# Patient Record
Sex: MALE | Race: WHITE | Employment: OTHER | URBAN - METROPOLITAN AREA
[De-identification: names, ages, dates, MRNs, and addresses within clinical notes are randomized per-mention and may not be internally consistent; named-entity substitution may affect disease eponyms.]

---

## 2017-01-02 ENCOUNTER — LAB ENCOUNTER (OUTPATIENT)
Dept: LAB | Age: 72
End: 2017-01-02
Attending: COLON & RECTAL SURGERY

## 2017-01-02 ENCOUNTER — OFFICE VISIT (OUTPATIENT)
Dept: FAMILY MEDICINE CLINIC | Facility: CLINIC | Age: 72
End: 2017-01-02

## 2017-01-02 VITALS
HEART RATE: 88 BPM | TEMPERATURE: 98 F | DIASTOLIC BLOOD PRESSURE: 74 MMHG | BODY MASS INDEX: 36 KG/M2 | RESPIRATION RATE: 16 BRPM | SYSTOLIC BLOOD PRESSURE: 114 MMHG | WEIGHT: 255 LBS | OXYGEN SATURATION: 94 %

## 2017-01-02 DIAGNOSIS — J01.10 ACUTE NON-RECURRENT FRONTAL SINUSITIS: Primary | ICD-10-CM

## 2017-01-02 DIAGNOSIS — H65.91 RIGHT NON-SUPPURATIVE OTITIS MEDIA: ICD-10-CM

## 2017-01-02 DIAGNOSIS — Z01.818 PRE-OP TESTING: ICD-10-CM

## 2017-01-02 DIAGNOSIS — K43.9 VENTRAL HERNIA WITHOUT OBSTRUCTION OR GANGRENE: ICD-10-CM

## 2017-01-02 LAB
ALBUMIN SERPL-MCNC: 4.1 G/DL (ref 3.5–4.8)
ALP LIVER SERPL-CCNC: 71 U/L (ref 45–117)
ALT SERPL-CCNC: 25 U/L (ref 17–63)
AST SERPL-CCNC: 15 U/L (ref 15–41)
BASOPHILS # BLD AUTO: 0.05 X10(3) UL (ref 0–0.1)
BASOPHILS NFR BLD AUTO: 0.6 %
BILIRUB SERPL-MCNC: 1 MG/DL (ref 0.1–2)
BUN BLD-MCNC: 14 MG/DL (ref 8–20)
CALCIUM BLD-MCNC: 8.8 MG/DL (ref 8.3–10.3)
CHLORIDE: 105 MMOL/L (ref 101–111)
CO2: 27 MMOL/L (ref 22–32)
CREAT BLD-MCNC: 0.96 MG/DL (ref 0.7–1.3)
EOSINOPHIL # BLD AUTO: 0.11 X10(3) UL (ref 0–0.3)
EOSINOPHIL NFR BLD AUTO: 1.3 %
ERYTHROCYTE [DISTWIDTH] IN BLOOD BY AUTOMATED COUNT: 12.9 % (ref 11.5–16)
GLUCOSE BLD-MCNC: 108 MG/DL (ref 70–99)
HCT VFR BLD AUTO: 46.1 % (ref 37–53)
HGB BLD-MCNC: 15.8 G/DL (ref 13–17)
IMMATURE GRANULOCYTE COUNT: 0.02 X10(3) UL (ref 0–1)
IMMATURE GRANULOCYTE RATIO %: 0.2 %
LYMPHOCYTES # BLD AUTO: 1.3 X10(3) UL (ref 0.9–4)
LYMPHOCYTES NFR BLD AUTO: 15.6 %
M PROTEIN MFR SERPL ELPH: 7.6 G/DL (ref 6.1–8.3)
MCH RBC QN AUTO: 30.3 PG (ref 27–33.2)
MCHC RBC AUTO-ENTMCNC: 34.3 G/DL (ref 31–37)
MCV RBC AUTO: 88.3 FL (ref 80–99)
MONOCYTES # BLD AUTO: 0.72 X10(3) UL (ref 0.1–0.6)
MONOCYTES NFR BLD AUTO: 8.7 %
NEUTROPHIL ABS PRELIM: 6.11 X10 (3) UL (ref 1.3–6.7)
NEUTROPHILS # BLD AUTO: 6.11 X10(3) UL (ref 1.3–6.7)
NEUTROPHILS NFR BLD AUTO: 73.6 %
PLATELET # BLD AUTO: 208 10(3)UL (ref 150–450)
POTASSIUM SERPL-SCNC: 4.3 MMOL/L (ref 3.6–5.1)
RBC # BLD AUTO: 5.22 X10(6)UL (ref 3.8–5.8)
RED CELL DISTRIBUTION WIDTH-SD: 41.7 FL (ref 35.1–46.3)
SODIUM SERPL-SCNC: 138 MMOL/L (ref 136–144)
WBC # BLD AUTO: 8.3 X10(3) UL (ref 4–13)

## 2017-01-02 PROCEDURE — 36415 COLL VENOUS BLD VENIPUNCTURE: CPT

## 2017-01-02 PROCEDURE — 85025 COMPLETE CBC W/AUTO DIFF WBC: CPT

## 2017-01-02 PROCEDURE — 85730 THROMBOPLASTIN TIME PARTIAL: CPT

## 2017-01-02 PROCEDURE — 99213 OFFICE O/P EST LOW 20 MIN: CPT | Performed by: PHYSICIAN ASSISTANT

## 2017-01-02 PROCEDURE — 80053 COMPREHEN METABOLIC PANEL: CPT

## 2017-01-02 RX ORDER — AZITHROMYCIN 250 MG/1
TABLET, FILM COATED ORAL
Qty: 6 TABLET | Refills: 0 | Status: SHIPPED | OUTPATIENT
Start: 2017-01-02 | End: 2017-02-21 | Stop reason: ALTCHOICE

## 2017-01-02 NOTE — PROGRESS NOTES
CHIEF COMPLAINT:   Patient presents with:  Cough: Pt c/o cough and nasal congestion X 1 month         HPI:   Gerdaneal Zimmerman. is a 70year old male who presents for cough and mucous for one month. Over this past week symptoms worsened.  He admits to [facility] (hypertension) 11/16/2013   • Left bundle branch block 11/14/2013   • Perforated sigmoid colon ([facility] [address] 11/14/2013   • Primary localized osteoarthrosis, lower leg 5/23/2012   • Sepsis ([facility] [address] 11/14/2013   • Stricture of sigmoid colon ([facility] [address] 11/14/2013   • Wound Pulse 88  Temp(Src) 98 °F (36.7 °C) (Oral)  Resp 16  Wt 255 lb  SpO2 94%  GENERAL: well developed, well nourished,in no apparent distress  SKIN: no rashes, no suspicious lesions  EYES: Conjunctiva clear. No scleral icterus.   HENT: Atraumatic, normocephali

## 2017-01-03 LAB — APTT PPP: 33.7 SECONDS (ref 25–34)

## 2017-01-11 ENCOUNTER — SURGERY (OUTPATIENT)
Age: 72
End: 2017-01-11

## 2017-01-11 PROBLEM — K43.9 VENTRAL HERNIA WITHOUT OBSTRUCTION OR GANGRENE: Status: ACTIVE | Noted: 2017-01-11

## 2017-01-24 ENCOUNTER — OFFICE VISIT (OUTPATIENT)
Dept: SURGERY | Facility: CLINIC | Age: 72
End: 2017-01-24

## 2017-01-24 VITALS — TEMPERATURE: 98 F | HEIGHT: 71 IN | BODY MASS INDEX: 34.58 KG/M2 | WEIGHT: 247 LBS

## 2017-01-24 DIAGNOSIS — K43.2 VENTRAL INCISIONAL HERNIA: Primary | ICD-10-CM

## 2017-01-24 PROCEDURE — 99024 POSTOP FOLLOW-UP VISIT: CPT | Performed by: COLON & RECTAL SURGERY

## 2017-01-24 RX ORDER — CEFADROXIL 500 MG/1
500 CAPSULE ORAL 2 TIMES DAILY
Qty: 20 CAPSULE | Refills: 0 | Status: SHIPPED | OUTPATIENT
Start: 2017-01-24 | End: 2017-02-03

## 2017-01-24 NOTE — PROGRESS NOTES
Post Operative Visit Note       Active Problems  1.  Ventral incisional hernia         Chief Complaint   Post-Op     History of Present Illness     This patient presents for further care and treatment after a ventral incisional herniorrhaphy with mesh was d without mention of obstruction or gangrene 6/29/2012   • Diverticulitis large intestine 7/24/2013   • HTN (hypertension) 11/16/2013   • Left bundle branch block 11/14/2013   • Perforated sigmoid colon ([facility] [address] 11/14/2013   • Primary localized osteoarthrosis, snare polypectomy, 10/1/2013 colonoscopy w/ dilatation of anastomotic stricture    REPAIR OF HYDROCELE,TUNICA  2000    PART REMOVAL COLON W END COLOSTOMY      VENTRAL HERNIA REPAIR  10/3/2014    Comment Procedure:  HERNIA VENTRAL REPAIR;  Surgeon: Jose Estrada Education:                 Number of children:               Social History Main Topics    Smoking Status: Former Smoker                   Packs/Day: 1.00  Years: 12        Quit date: 01/01/1975    Smokeless Status: Never Used                        Assurant for dysuria, urgency, frequency and difficulty urinating. Musculoskeletal: Negative for myalgias and arthralgias. Skin: Negative for color change and rash. Neurological: Negative for tremors, syncope and weakness.    Hematological: Negative for adenop assistance. Clinical examination reveals the abdomen is soft, nondistended, nontender, good bowel sounds. There is no evidence of recurrent residual hernia. He has some very slight moisture on the dressing on his left upper quadrant wound.   There is n

## 2017-01-25 NOTE — PATIENT INSTRUCTIONS
This patient presents for further care and treatment after a ventral incisional herniorrhaphy with mesh was done in the left upper quadrant, and the another ventral incisional hernia with suture was performed in the right midabdomen.     This patient is hyp

## 2017-01-31 ENCOUNTER — OFFICE VISIT (OUTPATIENT)
Dept: SURGERY | Facility: CLINIC | Age: 72
End: 2017-01-31

## 2017-01-31 VITALS — HEIGHT: 71 IN | WEIGHT: 250 LBS | TEMPERATURE: 98 F | BODY MASS INDEX: 35 KG/M2

## 2017-01-31 DIAGNOSIS — K43.2 VENTRAL INCISIONAL HERNIA: Primary | ICD-10-CM

## 2017-01-31 PROCEDURE — 99024 POSTOP FOLLOW-UP VISIT: CPT | Performed by: COLON & RECTAL SURGERY

## 2017-01-31 NOTE — PROGRESS NOTES
Follow Up Visit Note       Active Problems      1.  Ventral incisional hernia          Chief Complaint   Hernia    History of Present Illness  This patient presents for continued care and treatment following ventral incisional herniorrhaphy with mesh perfor • Dizziness and giddiness 6/29/2012   • Laboratory examination ordered as part of a routine general medical examination 6/29/2012   • Other seborrheic keratosis 6/29/2012   • Plantar wart 6/29/2012   • Screening for iron deficiency anemia 6/29/2012   • S • Unspecified sleep apnea EDWRAD psg 10/22/13 [place] 11-6-13     AHI 33, sao2 76% CPAP 15    • Hepatic cyst 3/14/2013     Lt hepatic cysts   • Fatty infiltration of liver 3/14/2013     mild-no recent symptoms   • High blood pressure          Past Surgical H SURGERY      Comment umbilical and right inguinal    HERNIA SURGERY  2005    Comment hernia repair    VENTRAL HERNIA REPAIR N/A 1/11/2017    Comment Procedure:  HERNIA VENTRAL REPAIR;  Surgeon: Sarkis Chowdary MD;  Location: [address] [place] [address] OR       [facility] Constitutional: Negative for fever, chills, diaphoresis, fatigue and unexpected weight change. HENT: Negative for hearing loss, nosebleeds, sore throat and trouble swallowing. Respiratory: Negative for apnea, cough, shortness of breath and wheezing. Neurological: He is oriented to person, place, and time. Skin: Skin is warm and dry. He is not diaphoretic. No erythema. Nursing note and vitals reviewed.     Assessment    Assessment   Ventral incisional hernia  (primary encounter diagnosis) surrounding cellulitis. There is no urticarial lesions surrounding this incision. Additionally, the patient mid back along the same dermatome there is an urticarial rash with some very small vesicles present.     At this junction I recommend the patient s provided further documentation of their own if necessary.

## 2017-02-01 NOTE — PATIENT INSTRUCTIONS
This patient presents for continued care and treatment following ventral incisional herniorrhaphy with mesh performed in the left upper quadrant and ventral incisional hernia repaired with suture in the right mid at area    The patient is hyper allergic to dermatologist.  I have referred him to Dr. Winston Leventhal. The patient has a very unusual case where he has reaction to any adhesive material as well as contact dermatitis following any hospitalization.   The patient has never had any issues with contact derm

## 2017-02-21 ENCOUNTER — OFFICE VISIT (OUTPATIENT)
Dept: FAMILY MEDICINE CLINIC | Facility: CLINIC | Age: 72
End: 2017-02-21

## 2017-02-21 VITALS
OXYGEN SATURATION: 98 % | SYSTOLIC BLOOD PRESSURE: 118 MMHG | RESPIRATION RATE: 16 BRPM | BODY MASS INDEX: 35 KG/M2 | DIASTOLIC BLOOD PRESSURE: 68 MMHG | HEART RATE: 84 BPM | TEMPERATURE: 98 F | WEIGHT: 253 LBS

## 2017-02-21 DIAGNOSIS — B37.0 THRUSH, ORAL: Primary | ICD-10-CM

## 2017-02-21 DIAGNOSIS — K14.0 TONGUE ULCERATION: ICD-10-CM

## 2017-02-21 PROCEDURE — 99213 OFFICE O/P EST LOW 20 MIN: CPT | Performed by: PHYSICIAN ASSISTANT

## 2017-02-21 NOTE — PROGRESS NOTES
CHIEF COMPLAINT:   Patient presents with:  Canker Sores: tongue. HPI:   Chula Morales. is a 70year old male who presents for canker sores on tongue and inside of mouth for 4 days.   Wife reports pt has been on multiple antibiotics and steroids rec • Laboratory examination ordered as part of a routine general medical examination 6/29/2012   • Other seborrheic keratosis 6/29/2012   • Plantar wart 6/29/2012   • Screening for iron deficiency anemia 6/29/2012   • Screening for lipoid disorders 6/29/2012 • Unspecified sleep apnea EDWRAD psg 10/22/13 [place] 11-6-13     AHI 33, sao2 76% CPAP 15    • Hepatic cyst 3/14/2013     Lt hepatic cysts   • Fatty infiltration of liver 3/14/2013     mild-no recent symptoms   • High blood pressure           Past Surgical His HERNIA SURGERY      Comment umbilical and right inguinal    HERNIA SURGERY  2005    Comment hernia repair    VENTRAL HERNIA REPAIR N/A 1/11/2017    Comment Procedure:  HERNIA VENTRAL REPAIR;  Surgeon: Jeevan Gold MD;  Location: [address] [place] [address] OR           [facility] PLAN: Meds as below as symptoms suspicious for thrush- use as directed. Comfort care as described in Patient Instructions. Pt advised follow up with PCP should symptoms acutely worsen or fail to improve with medication. Avoid spicy and acidic foods. [place] Stabs happens when something lets too much Candida grow inside your mouth and throat. Certain things that change the normal balance of organisms in the mouth can lead to thrush. One example is antibiotic medicine.  This medicine may kill some of the normal In some cases, you may need an antifungal pill. This can remove Candida throughout your body. Or you may need medicine through an IV. These treatments depend on how severe your infection is, and what other health conditions you have.   If you are at high ri © 8115-8476 The [address], [address] [place]. All rights reserved. This information is not intended as a substitute for professional medical care. Always follow your healthcare professional's instructions.               Lehta Tapia

## 2017-02-21 NOTE — PATIENT INSTRUCTIONS
Candida Infection: Nirmal Wiseman is a type of fungal infection in the mouth and throat. Garo Segura does not usually affect healthy adults. It is more common in people with a weakened immune system. It is also more likely if you take antibiotics.  Garo Segura is n Your health care provider will ask about your medical history and your symptoms. He or she will look closely at your mouth and throat. White or red patches will be scraped with a tongue depressor.  The sample will be sent to a lab to test. This test can usu You may be able to help prevent some cases of thrush. Make sure to:  · Practice good oral hygiene. Try using a chlorhexidine mouthwash. · Clean your dentures regularly as instructed. Make sure they fit you correctly.   · After using a corticosteroid inhale

## 2017-03-06 RX ORDER — MELOXICAM 15 MG/1
TABLET ORAL
Qty: 90 TABLET | Refills: 0 | Status: SHIPPED | OUTPATIENT
Start: 2017-03-06 | End: 2017-06-01

## 2017-05-16 ENCOUNTER — OFFICE VISIT (OUTPATIENT)
Dept: SURGERY | Facility: CLINIC | Age: 72
End: 2017-05-16

## 2017-05-16 VITALS
DIASTOLIC BLOOD PRESSURE: 60 MMHG | WEIGHT: 255 LBS | HEIGHT: 71 IN | HEART RATE: 80 BPM | BODY MASS INDEX: 35.7 KG/M2 | TEMPERATURE: 99 F | SYSTOLIC BLOOD PRESSURE: 120 MMHG

## 2017-05-16 DIAGNOSIS — B36.9 FUNGAL DERMATITIS: Primary | ICD-10-CM

## 2017-05-16 PROCEDURE — 99214 OFFICE O/P EST MOD 30 MIN: CPT | Performed by: COLON & RECTAL SURGERY

## 2017-05-16 RX ORDER — FLUCONAZOLE 200 MG/1
200 TABLET ORAL DAILY
Qty: 14 TABLET | Refills: 0 | Status: SHIPPED | OUTPATIENT
Start: 2017-05-16 | End: 2017-05-30

## 2017-05-16 NOTE — PROGRESS NOTES
Follow Up Visit Note       Active Problems      1. Fungal dermatitis          Chief Complaint   Patient presents with:  Hernia: further care and treatment Ventral incisional hernia.  S/p Ventral incisional herniorrhaphy with mesh left upper quadrant, ventra stones Rt lower pole kidney   • Diverticulitis 4/16/2013   • Cardiomyopathy ([facility] [address]      nonischemic LVEF 45-50%   • Anxiety state, unspecified 6/29/2012   • Arthropathy, unspecified, site unspecified 6/29/2012   • Cellulitis and abscess of face 6/29/2012 (pre-diabetes)    • Visual impairment      glasses   • Shortness of breath      ON EXERTION NOT REQUIRING O2   • Osteoarthrosis, unspecified whether generalized or localized, unspecified site    • History of blood transfusion 2001   • Calculus of kidney Comment Cysto, R RPG, R ureteral dilation, R URS, R Nephroscopy w/ stone manipulation and laser litho of renal calculi, R stent placement     OTHER SURGICAL HISTORY  3/21/16    Comment Cysto Stent Removal     OTHER SURGICAL HISTORY  3/22/16    Comment Rt E Review of Systems  The Review of Systems has been reviewed by me during today. Review of Systems   Constitutional: Negative for fever, chills, diaphoresis, fatigue and unexpected weight change.    HENT: Negative for hearing loss, nosebleeds, sore throat dermatitis  (primary encounter diagnosis)    Plan   This patient has had multiple ventral incisional hernia repairs. He presents at this time with some skin changes around the previous incision sites.     This patient was seeing the dermatologist.  He was refill. If he still has a rash at the end of 14 days, he should refill the Diflucan and see me 1 week after this first refill. Otherwise I will see him on an as-needed basis.   There remains no evidence of recurrent residual ventral incisional hernia

## 2017-05-17 NOTE — PATIENT INSTRUCTIONS
This patient has had multiple ventral incisional hernia repairs. He presents at this time with some skin changes around the previous incision sites. This patient was seeing the dermatologist.  He was started on a medication. He get oral thrush.   He go he should refill the Diflucan and see me 1 week after this first refill. Otherwise I will see him on an as-needed basis. There remains no evidence of recurrent residual ventral incisional hernia on today's examination. Woody De La Vega

## 2017-05-30 RX ORDER — FLUCONAZOLE 200 MG/1
200 TABLET ORAL DAILY
Qty: 14 TABLET | Refills: 0 | Status: SHIPPED | OUTPATIENT
Start: 2017-05-30 | End: 2017-06-13

## 2017-06-02 RX ORDER — MELOXICAM 15 MG/1
TABLET ORAL
Qty: 90 TABLET | Refills: 0 | Status: SHIPPED | OUTPATIENT
Start: 2017-06-02 | End: 2017-08-31

## 2017-06-06 ENCOUNTER — OFFICE VISIT (OUTPATIENT)
Dept: SURGERY | Facility: CLINIC | Age: 72
End: 2017-06-06

## 2017-06-06 VITALS
WEIGHT: 255 LBS | HEART RATE: 80 BPM | DIASTOLIC BLOOD PRESSURE: 72 MMHG | BODY MASS INDEX: 36 KG/M2 | SYSTOLIC BLOOD PRESSURE: 140 MMHG | TEMPERATURE: 98 F

## 2017-06-06 DIAGNOSIS — B36.9 FUNGAL DERMATITIS: Primary | ICD-10-CM

## 2017-06-06 PROCEDURE — 99213 OFFICE O/P EST LOW 20 MIN: CPT | Performed by: COLON & RECTAL SURGERY

## 2017-06-06 NOTE — PROGRESS NOTES
Follow Up Visit Note       Active Problems      1.  Fungal dermatitis          Chief Complaint   Patient presents with:  Derm Problem: Had fungal infection on abdomen, also thinks he has another bulge - possible hernia        History of Present Illness  Thi unspecified, site unspecified 6/29/2012   • Cellulitis and abscess of face 6/29/2012   • Dizziness and giddiness 6/29/2012   • Laboratory examination ordered as part of a routine general medical examination 6/29/2012   • Other seborrheic keratosis 6/29/201 unspecified site    • History of blood transfusion 2001   • Calculus of kidney    • Prediabetes    • Unspecified sleep apnea EDWRAD psg 10/22/13 [place] 11-6-13     AHI 33, sao2 76% CPAP 15    • Hepatic cyst 3/14/2013     Lt hepatic cysts   • Fatty infiltration Comment Cysto Stent Removal     OTHER SURGICAL HISTORY  3/22/16    Comment Rt ESWL    VENTRAL HERNIA REPAIR N/A 1/11/2017    Comment Procedure:  HERNIA VENTRAL REPAIR;  Surgeon: Kosta Gabriel MD;  Location: [address] [place] [address] OR    HERNIA SURGERY      Comment umbilic today.  Review of Systems   Constitutional: Negative for fever, chills, diaphoresis, fatigue and unexpected weight change. HENT: Negative for hearing loss, nosebleeds, sore throat and trouble swallowing.     Respiratory: Negative for apnea, cough, shortne Bowel sounds abnormal activity normal pitch. There are no interruptions in the skin. There are no full-thickness eschars or other abnormalities at any of the incisions. This dermatitis is a topical dermatitis showing a very well demarcated edge.        Manish Harding his prolonged fluconazole treatment. The bad news is they still appear to remain fairly aggressive. The largest is vertical along the midline incision in the epigastrium and it measures approximately 6 cm tall by 3 cm wide.   The other 2 lesions are more

## 2017-06-07 PROBLEM — K43.9 VENTRAL HERNIA WITHOUT OBSTRUCTION OR GANGRENE: Status: RESOLVED | Noted: 2017-01-11 | Resolved: 2017-06-07

## 2017-06-07 RX ORDER — FLUCONAZOLE 200 MG/1
200 TABLET ORAL DAILY
Qty: 10 TABLET | Refills: 1 | Status: SHIPPED | OUTPATIENT
Start: 2017-06-07 | End: 2017-06-17

## 2017-06-07 NOTE — PATIENT INSTRUCTIONS
This patient presents this time for further care and evaluation of a severe fungal dermatitis. This patient has significant topical allergies.   Every time he goes to the hospital he gets systemic hives and a systemic reaction to tape and other hospital transverse incisions that does not currently represent a true hernia. Bowel sounds abnormal activity normal pitch. There are no interruptions in the skin. There are no full-thickness eschars or other abnormalities at any of the incisions.   This dermatit

## 2017-06-20 ENCOUNTER — OFFICE VISIT (OUTPATIENT)
Dept: SURGERY | Facility: CLINIC | Age: 72
End: 2017-06-20

## 2017-06-20 VITALS
TEMPERATURE: 98 F | HEIGHT: 71 IN | BODY MASS INDEX: 35.7 KG/M2 | DIASTOLIC BLOOD PRESSURE: 78 MMHG | HEART RATE: 80 BPM | WEIGHT: 255 LBS | SYSTOLIC BLOOD PRESSURE: 130 MMHG

## 2017-06-20 DIAGNOSIS — B36.9 FUNGAL DERMATITIS: Primary | ICD-10-CM

## 2017-06-20 PROCEDURE — 99212 OFFICE O/P EST SF 10 MIN: CPT | Performed by: COLON & RECTAL SURGERY

## 2017-06-20 RX ORDER — FLUCONAZOLE 200 MG/1
200 TABLET ORAL DAILY
Refills: 1 | COMMUNITY
Start: 2017-06-09 | End: 2017-10-27 | Stop reason: ALTCHOICE

## 2017-06-20 NOTE — PATIENT INSTRUCTIONS
This patient has had a fungal dermatitis on the abdominal wall. It has been extremely refractory to any treatment. We have had the patient on a very long course of oral Diflucan.   At the last visit, at the patient suggestion, he started reapplying also t

## 2017-06-20 NOTE — PROGRESS NOTES
Follow Up Visit Note       Active Problems      1.  Fungal dermatitis          Chief Complaint   Patient presents with:  Derm Problem: pt here for 2 week further care and treatment Fungal dermatitis on abdomen        History of Present Illness  This patient of obstruction or gangrene 6/29/2012   • Diverticulitis large intestine 7/24/2013   • HTN (hypertension) 11/16/2013   • Left bundle branch block 11/14/2013   • [facility] 11/14/2013   • Primary localized osteoarthrosis, lower leg 5/23/2 polypectomy, 10/1/2013 colonoscopy w/ dilatation of anastomotic stricture    REPAIR OF HYDROCELE,TUNICA  2000    PART REMOVAL COLON W END COLOSTOMY      VENTRAL HERNIA REPAIR  10/3/2014    Comment Procedure:  HERNIA VENTRAL REPAIR;  Surgeon: Kisha Levin Spouse Name:                       Years of Education:                 Number of children:               Social History Main Topics    Smoking Status: Former Smoker                   Packs/Day: 1.00  Years: 12        Quit date: 01/01/1975    Smo Hematological: Negative for adenopathy. Does not bruise/bleed easily. Psychiatric/Behavioral: Negative for behavioral problems and sleep disturbance.         Physical Findings   /78 mmHg  Pulse 80  Temp(Src) 98 °F (36.7 °C)  Ht 71\"  Wt 255 lb  BM placed in this encounter. Imaging & Referrals   None    Follow Up  Return if symptoms worsen or fail to improve.     Yolande Pacheco MD

## 2017-08-14 ENCOUNTER — APPOINTMENT (OUTPATIENT)
Dept: CT IMAGING | Facility: HOSPITAL | Age: 72
End: 2017-08-14
Attending: EMERGENCY MEDICINE
Payer: MEDICARE

## 2017-08-14 ENCOUNTER — HOSPITAL ENCOUNTER (EMERGENCY)
Facility: HOSPITAL | Age: 72
Discharge: HOME | End: 2017-08-14
Attending: EMERGENCY MEDICINE
Payer: MEDICARE

## 2017-08-14 ENCOUNTER — APPOINTMENT (OUTPATIENT)
Dept: CT IMAGING | Age: 72
End: 2017-08-14
Attending: EMERGENCY MEDICINE
Payer: MEDICARE

## 2017-08-14 ENCOUNTER — OFFICE VISIT (OUTPATIENT)
Dept: FAMILY MEDICINE CLINIC | Facility: CLINIC | Age: 72
End: 2017-08-14

## 2017-08-14 ENCOUNTER — TELEPHONE (OUTPATIENT)
Dept: FAMILY MEDICINE CLINIC | Facility: CLINIC | Age: 72
End: 2017-08-14

## 2017-08-14 VITALS
DIASTOLIC BLOOD PRESSURE: 74 MMHG | OXYGEN SATURATION: 98 % | HEART RATE: 72 BPM | HEIGHT: 68.5 IN | SYSTOLIC BLOOD PRESSURE: 126 MMHG | BODY MASS INDEX: 38.21 KG/M2 | WEIGHT: 255 LBS | TEMPERATURE: 98 F | RESPIRATION RATE: 16 BRPM

## 2017-08-14 VITALS
SYSTOLIC BLOOD PRESSURE: 123 MMHG | WEIGHT: 255 LBS | RESPIRATION RATE: 16 BRPM | HEIGHT: 71 IN | OXYGEN SATURATION: 98 % | DIASTOLIC BLOOD PRESSURE: 68 MMHG | TEMPERATURE: 99 F | BODY MASS INDEX: 35.7 KG/M2 | HEART RATE: 72 BPM

## 2017-08-14 DIAGNOSIS — H91.92 HEARING LOSS OF LEFT EAR, UNSPECIFIED HEARING LOSS TYPE: ICD-10-CM

## 2017-08-14 DIAGNOSIS — H81.392 VERTIGO, PERIPHERAL, LEFT: Primary | ICD-10-CM

## 2017-08-14 DIAGNOSIS — R42 VERTIGO: Primary | ICD-10-CM

## 2017-08-14 PROCEDURE — 99284 EMERGENCY DEPT VISIT MOD MDM: CPT

## 2017-08-14 PROCEDURE — 70450 CT HEAD/BRAIN W/O DYE: CPT | Performed by: EMERGENCY MEDICINE

## 2017-08-14 PROCEDURE — 99213 OFFICE O/P EST LOW 20 MIN: CPT | Performed by: NURSE PRACTITIONER

## 2017-08-14 RX ORDER — ONDANSETRON 4 MG/1
4 TABLET, ORALLY DISINTEGRATING ORAL ONCE
Status: COMPLETED | OUTPATIENT
Start: 2017-08-14 | End: 2017-08-14

## 2017-08-14 RX ORDER — ONDANSETRON 4 MG/1
4 TABLET, ORALLY DISINTEGRATING ORAL EVERY 4 HOURS PRN
Qty: 10 TABLET | Refills: 0 | Status: SHIPPED | OUTPATIENT
Start: 2017-08-14 | End: 2017-08-21

## 2017-08-14 RX ORDER — MECLIZINE HYDROCHLORIDE 25 MG/1
25 TABLET ORAL 3 TIMES DAILY PRN
Qty: 9 TABLET | Refills: 0 | Status: SHIPPED | OUTPATIENT
Start: 2017-08-14 | End: 2017-09-20 | Stop reason: ALTCHOICE

## 2017-08-14 RX ORDER — MECLIZINE HYDROCHLORIDE 25 MG/1
25 TABLET ORAL ONCE
Status: COMPLETED | OUTPATIENT
Start: 2017-08-14 | End: 2017-08-14

## 2017-08-14 NOTE — ED NOTES
Pt is very upset, still waiting for CT. CT scanner is down at this time. Explained to pt that someone is currently fixing it.

## 2017-08-14 NOTE — PROGRESS NOTES
CHIEF COMPLAINT:   Patient presents with:  Ear Problem: Left ear, And dizzy. HPI:   Jai Patiño. is a 70year old male who presents to clinic today with complaints of left hearing loss and vertigo.  Pt states that he has had vertigo several t • Change in stool 9/18/2013   • Colon polyp 5/2/2013    x1   • Constipation 9/18/2013   • Diarrhea 9/18/2013   • Diverticulitis 4/16/2013   • Diverticulitis large intestine 7/24/2013   • Diverticulosis 5/2/2013   • Dizziness and giddiness 6/29/2012   • Dys • Ventral hernia, unspecified, without mention of obstruction or gangrene 6/29/2012   • Visual impairment     glasses   • Wound hematoma 7/26/2013   • Wound infection 11/25/2013      Social History:  Smoking status: Former Smoker PLAN: Meds as listed below.   Comfort measures as described in Patient Instructions  Educated on medication- informed pt that medication can cause drowsiness  I explained my limitations in the [facility]  educated on following up with ENT d/t left ear hearing loss © 3923-5472 [address], [address] [place] [place]. All rights reserved. This information is not intended as a substitute for professional medical care. Always follow your healthcare professional's instructions.               P

## 2017-08-14 NOTE — ED PROVIDER NOTES
Patient Seen in: [address] [facility] [place]    History   Patient presents with:  Dizziness (neurologic)    Stated Complaint: vertigo, ear problem    HPI    72-year-old male presents for evaluation of vertigo.   Patient woke up this morning (pre-diabetes)    • Hepatic cyst 3/14/2013    Lt hepatic cysts   • High blood pressure    • History of blood transfusion 2001   • HTN (hypertension) 11/16/2013   • Internal hemorrhoids 5/2/2013   • Kidney stone     nephrolithiasis, 3/14/2013 nonobstructing colonoscopy w/ dilatation of                anastomotic stricture  No date: COLONOSCOPY  No date: HERNIA SURGERY      Comment: umbilical and right inguinal  2005: HERNIA SURGERY      Comment: hernia repair  1/2017: HERNIA SURGERY      Comment: ventral chelsi Surgeon:                Jesús Guzman MD;  Location: [address] [place] [address] MAIN OR    Medications :   Meclizine HCl 25 MG Oral Tab,  Take 1 tablet (25 mg total) by mouth 3 (three) times daily as needed.    ondansetron 4 MG Oral Tablet Dispersible,  Take 1 tablet (4 mg total) Horizontal nystagmus with left lateral gaze  Neck: Supple no meningismus  Lungs: Clear to auscultation bilaterally. Heart: Regular rate and rhythm. Abdomen: Soft, nontender. Skin: No rash. No edema. Neurologic: No focal neurologic deficits.   Normal s

## 2017-08-14 NOTE — ED INITIAL ASSESSMENT (HPI)
Pt c/o dizziness and lt ear fullness since yesterday morning. Pt was seen by pcp today and diagnosed with vertigo cannot see ent for 2 wks.

## 2017-08-30 ENCOUNTER — MED REC SCAN ONLY (OUTPATIENT)
Dept: FAMILY MEDICINE CLINIC | Facility: CLINIC | Age: 72
End: 2017-08-30

## 2017-08-31 RX ORDER — MELOXICAM 15 MG/1
TABLET ORAL
Qty: 90 TABLET | Refills: 0 | Status: SHIPPED | OUTPATIENT
Start: 2017-08-31 | End: 2017-11-28

## 2017-08-31 NOTE — TELEPHONE ENCOUNTER
Medication failed protocol please approve or deny  LOV- 9/22/2016 physical, no appointment scheduled  Med last refilled 6/2/2017 x 3 months

## 2017-09-13 ENCOUNTER — MED REC SCAN ONLY (OUTPATIENT)
Dept: FAMILY MEDICINE CLINIC | Facility: CLINIC | Age: 72
End: 2017-09-13

## 2017-09-14 ENCOUNTER — TELEPHONE (OUTPATIENT)
Dept: FAMILY MEDICINE CLINIC | Facility: CLINIC | Age: 72
End: 2017-09-14

## 2017-09-14 NOTE — TELEPHONE ENCOUNTER
Patient contacting office requesting Dr. Govind Gonzalez to order lab work for checking his psa. He also explained he would like labs done because he has been experiencing cramping and would like to determine the cause. An appt.  was scheduled for 9/20/17 Please ad

## 2017-09-15 NOTE — TELEPHONE ENCOUNTER
Pt states he has cramping in his legs, hands, feet. It started when he had his first round of Prednisone from Dr Hanna Grover for dizziness and loss of hearing. He has had 2 rounds of prednisone 9 days each round.   Pt takes the prednisone at night and c/o incre

## 2017-09-20 ENCOUNTER — OFFICE VISIT (OUTPATIENT)
Dept: FAMILY MEDICINE CLINIC | Facility: CLINIC | Age: 72
End: 2017-09-20

## 2017-09-20 VITALS
BODY MASS INDEX: 35.56 KG/M2 | TEMPERATURE: 99 F | HEART RATE: 101 BPM | WEIGHT: 254 LBS | DIASTOLIC BLOOD PRESSURE: 90 MMHG | RESPIRATION RATE: 16 BRPM | OXYGEN SATURATION: 97 % | HEIGHT: 71 IN | SYSTOLIC BLOOD PRESSURE: 138 MMHG

## 2017-09-20 DIAGNOSIS — Z12.5 PROSTATE CANCER SCREENING: ICD-10-CM

## 2017-09-20 DIAGNOSIS — E78.2 MIXED HYPERLIPIDEMIA: ICD-10-CM

## 2017-09-20 DIAGNOSIS — R52 BODY ACHES: ICD-10-CM

## 2017-09-20 DIAGNOSIS — E55.9 VITAMIN D DEFICIENCY: ICD-10-CM

## 2017-09-20 DIAGNOSIS — F41.1 ANXIETY STATE: ICD-10-CM

## 2017-09-20 DIAGNOSIS — F41.1 GAD (GENERALIZED ANXIETY DISORDER): ICD-10-CM

## 2017-09-20 DIAGNOSIS — I10 ESSENTIAL HYPERTENSION: Primary | ICD-10-CM

## 2017-09-20 DIAGNOSIS — K76.0 FATTY INFILTRATION OF LIVER: ICD-10-CM

## 2017-09-20 DIAGNOSIS — J01.01 ACUTE RECURRENT MAXILLARY SINUSITIS: ICD-10-CM

## 2017-09-20 DIAGNOSIS — R42 DIZZINESS: ICD-10-CM

## 2017-09-20 PROCEDURE — 99214 OFFICE O/P EST MOD 30 MIN: CPT | Performed by: FAMILY MEDICINE

## 2017-09-20 RX ORDER — AZITHROMYCIN 250 MG/1
TABLET, FILM COATED ORAL
Qty: 6 TABLET | Refills: 0 | Status: SHIPPED | OUTPATIENT
Start: 2017-09-20 | End: 2017-10-18 | Stop reason: ALTCHOICE

## 2017-09-20 RX ORDER — TRIAMTERENE AND HYDROCHLOROTHIAZIDE 37.5; 25 MG/1; MG/1
1 CAPSULE ORAL
Refills: 2 | COMMUNITY
Start: 2017-08-22 | End: 2017-10-27

## 2017-09-20 RX ORDER — ALPRAZOLAM 0.5 MG/1
0.5 TABLET ORAL 2 TIMES DAILY PRN
Qty: 30 TABLET | Refills: 0 | Status: SHIPPED | OUTPATIENT
Start: 2017-09-20 | End: 2017-11-30

## 2017-09-22 NOTE — PROGRESS NOTES
Chief Complaint:   Patient presents with:  Cramps: in legs and hands   Dizziness: patient had dizzy spell and loss hearing in left ear     HPI:   This is a 70year old male   Patient presents for recheck of his hypertension.  Pt has been taking medications nephrolithiasis, 3/14/2013 nonobstructing stones Rt lower pole kidney   • Laboratory examination ordered as part of a routine general medical examination 6/29/2012   • Left bundle branch block 11/14/2013   • Left inguinal hernia 3/14/2013    small fat cont SURGERY      Comment: ventral hernia with mesh  No date: I&D RECTAL SUBMUCOSAL ABSCESS  No date: ORTHOPEDIC SURG (PBP)      Comment: shattered right great toe-pins  No date: OTHER SURGICAL HISTORY      Comment: BILATERAL A  PMM   No date: OTHER SURGICAL HI 1/1/1975  Smokeless tobacco: Never Used                      Alcohol use:  No              Family History:  Family History   Problem Relation Age of Onset   • Diabetes Mother    • Diabetes Brother      Allergies:    Neosporin Original *    Hives  Adhesive T chest pain, palpitations and leg swelling. Gastrointestinal: Negative for vomiting, abdominal pain, diarrhea, blood in stool and abdominal distention. Endocrine: Negative for cold intolerance, heat intolerance, polydipsia, polyphagia and polyuria.    Ge deviation present. No thyromegaly present. Cardiovascular: Normal rate, regular rhythm, normal heart sounds and intact distal pulses. No murmur heard. Edema not present. Pulmonary/Chest: Effort normal and breath sounds normal. No stridor.  No respir labs.   - VITAMIN D, 25-HYDROXY; Future    9. Fatty infiltration of liver  -asymptomatic, diet control, will check labs. 10. Mixed hyperlipidemia  -diet control, will check labs. - LIPID PANEL; Future    Follow up in 3 months, sooner as needed.

## 2017-09-23 ENCOUNTER — LABORATORY ENCOUNTER (OUTPATIENT)
Dept: LAB | Age: 72
End: 2017-09-23
Attending: FAMILY MEDICINE
Payer: MEDICARE

## 2017-09-23 DIAGNOSIS — E55.9 VITAMIN D DEFICIENCY: ICD-10-CM

## 2017-09-23 DIAGNOSIS — E78.2 MIXED HYPERLIPIDEMIA: ICD-10-CM

## 2017-09-23 DIAGNOSIS — R42 DIZZINESS: ICD-10-CM

## 2017-09-23 DIAGNOSIS — R52 BODY ACHES: ICD-10-CM

## 2017-09-23 DIAGNOSIS — Z12.5 PROSTATE CANCER SCREENING: ICD-10-CM

## 2017-09-23 LAB
25-HYDROXYVITAMIN D (TOTAL): 16.1 NG/ML (ref 30–100)
ALBUMIN SERPL-MCNC: 3.3 G/DL (ref 3.5–4.8)
ALP LIVER SERPL-CCNC: 52 U/L (ref 45–117)
ALT SERPL-CCNC: 27 U/L (ref 17–63)
AST SERPL-CCNC: 13 U/L (ref 15–41)
BASOPHILS # BLD AUTO: 0.04 X10(3) UL (ref 0–0.1)
BASOPHILS NFR BLD AUTO: 0.6 %
BILIRUB SERPL-MCNC: 0.9 MG/DL (ref 0.1–2)
BUN BLD-MCNC: 25 MG/DL (ref 8–20)
CALCIUM BLD-MCNC: 8.5 MG/DL (ref 8.3–10.3)
CHLORIDE: 104 MMOL/L (ref 101–111)
CHOLEST SMN-MCNC: 127 MG/DL (ref ?–200)
CO2: 24 MMOL/L (ref 22–32)
COMPLEXED PSA SERPL-MCNC: 1.33 NG/ML (ref 0.01–4)
CREAT BLD-MCNC: 0.96 MG/DL (ref 0.7–1.3)
EOSINOPHIL # BLD AUTO: 0.16 X10(3) UL (ref 0–0.3)
EOSINOPHIL NFR BLD AUTO: 2.5 %
ERYTHROCYTE [DISTWIDTH] IN BLOOD BY AUTOMATED COUNT: 13.4 % (ref 11.5–16)
GLUCOSE BLD-MCNC: 162 MG/DL (ref 70–99)
HCT VFR BLD AUTO: 43.3 % (ref 37–53)
HDLC SERPL-MCNC: 36 MG/DL (ref 45–?)
HDLC SERPL: 3.53 {RATIO} (ref ?–4.97)
HGB BLD-MCNC: 14.4 G/DL (ref 13–17)
IMMATURE GRANULOCYTE COUNT: 0.03 X10(3) UL (ref 0–1)
IMMATURE GRANULOCYTE RATIO %: 0.5 %
LDLC SERPL CALC-MCNC: 61 MG/DL (ref ?–130)
LDLC SERPL-MCNC: 30 MG/DL (ref 5–40)
LYMPHOCYTES # BLD AUTO: 1.61 X10(3) UL (ref 0.9–4)
LYMPHOCYTES NFR BLD AUTO: 25.2 %
M PROTEIN MFR SERPL ELPH: 6.7 G/DL (ref 6.1–8.3)
MCH RBC QN AUTO: 29.9 PG (ref 27–33.2)
MCHC RBC AUTO-ENTMCNC: 33.3 G/DL (ref 31–37)
MCV RBC AUTO: 89.8 FL (ref 80–99)
MONOCYTES # BLD AUTO: 0.59 X10(3) UL (ref 0.1–0.6)
MONOCYTES NFR BLD AUTO: 9.2 %
NEUTROPHIL ABS PRELIM: 3.95 X10 (3) UL (ref 1.3–6.7)
NEUTROPHILS # BLD AUTO: 3.95 X10(3) UL (ref 1.3–6.7)
NEUTROPHILS NFR BLD AUTO: 62 %
NONHDLC SERPL-MCNC: 91 MG/DL (ref ?–130)
PLATELET # BLD AUTO: 213 10(3)UL (ref 150–450)
POTASSIUM SERPL-SCNC: 4.5 MMOL/L (ref 3.6–5.1)
RBC # BLD AUTO: 4.82 X10(6)UL (ref 3.8–5.8)
RED CELL DISTRIBUTION WIDTH-SD: 43.6 FL (ref 35.1–46.3)
SODIUM SERPL-SCNC: 136 MMOL/L (ref 136–144)
TRIGLYCERIDES: 150 MG/DL (ref ?–150)
WBC # BLD AUTO: 6.4 X10(3) UL (ref 4–13)

## 2017-09-23 PROCEDURE — 36415 COLL VENOUS BLD VENIPUNCTURE: CPT

## 2017-09-23 PROCEDURE — 80053 COMPREHEN METABOLIC PANEL: CPT

## 2017-09-23 PROCEDURE — 85025 COMPLETE CBC W/AUTO DIFF WBC: CPT

## 2017-09-23 PROCEDURE — 82306 VITAMIN D 25 HYDROXY: CPT

## 2017-09-23 PROCEDURE — 80061 LIPID PANEL: CPT

## 2017-09-29 ENCOUNTER — TELEPHONE (OUTPATIENT)
Dept: FAMILY MEDICINE CLINIC | Facility: CLINIC | Age: 72
End: 2017-09-29

## 2017-09-29 DIAGNOSIS — R73.9 ELEVATED BLOOD SUGAR: Primary | ICD-10-CM

## 2017-09-29 DIAGNOSIS — E55.9 VITAMIN D DEFICIENCY: ICD-10-CM

## 2017-09-29 RX ORDER — ERGOCALCIFEROL 1.25 MG/1
50000 CAPSULE ORAL WEEKLY
Qty: 4 CAPSULE | Refills: 2 | Status: SHIPPED | OUTPATIENT
Start: 2017-09-29 | End: 2017-10-18 | Stop reason: ALTCHOICE

## 2017-09-29 NOTE — TELEPHONE ENCOUNTER
----- Message from Aakash Sin PA-C sent at 9/25/2017  3:42 PM CDT -----  Low vitamin D - Please have patient take Rx of 50,000 units of vitamin D once per week for 3 months and then OTC maintenance dose of 5000 units OTC daily.  Recheck labs in 3 m

## 2017-09-29 NOTE — TELEPHONE ENCOUNTER
Patient made aware of abnormal lab results and orders from the doctor. Prescription for Vitamin D sent to [facility] pharmacy per patient request. Matty Ash of follow up blood work. Will come in for A1C blood draw.

## 2017-09-30 ENCOUNTER — APPOINTMENT (OUTPATIENT)
Dept: LAB | Age: 72
End: 2017-09-30
Attending: FAMILY MEDICINE
Payer: MEDICARE

## 2017-09-30 DIAGNOSIS — R73.9 ELEVATED BLOOD SUGAR: ICD-10-CM

## 2017-09-30 PROCEDURE — 83036 HEMOGLOBIN GLYCOSYLATED A1C: CPT

## 2017-09-30 PROCEDURE — 36415 COLL VENOUS BLD VENIPUNCTURE: CPT

## 2017-10-02 ENCOUNTER — TELEPHONE (OUTPATIENT)
Dept: FAMILY MEDICINE CLINIC | Facility: CLINIC | Age: 72
End: 2017-10-02

## 2017-10-02 NOTE — TELEPHONE ENCOUNTER
I called pt at [phone] and verified 2 patient identifiers: name & . I discussed results and need for OV. Scheduled pt for 10/18. I offered sooner appts but pt refused.

## 2017-10-03 ENCOUNTER — MED REC SCAN ONLY (OUTPATIENT)
Dept: FAMILY MEDICINE CLINIC | Facility: CLINIC | Age: 72
End: 2017-10-03

## 2017-10-18 ENCOUNTER — TELEPHONE (OUTPATIENT)
Dept: FAMILY MEDICINE CLINIC | Facility: CLINIC | Age: 72
End: 2017-10-18

## 2017-10-18 ENCOUNTER — OFFICE VISIT (OUTPATIENT)
Dept: FAMILY MEDICINE CLINIC | Facility: CLINIC | Age: 72
End: 2017-10-18

## 2017-10-18 ENCOUNTER — PATIENT OUTREACH (OUTPATIENT)
Dept: CASE MANAGEMENT | Age: 72
End: 2017-10-18

## 2017-10-18 ENCOUNTER — TELEPHONE (OUTPATIENT)
Dept: ENDOCRINOLOGY CLINIC | Facility: CLINIC | Age: 72
End: 2017-10-18

## 2017-10-18 VITALS
RESPIRATION RATE: 16 BRPM | OXYGEN SATURATION: 98 % | HEIGHT: 71 IN | DIASTOLIC BLOOD PRESSURE: 60 MMHG | SYSTOLIC BLOOD PRESSURE: 114 MMHG | TEMPERATURE: 98 F | BODY MASS INDEX: 34.02 KG/M2 | WEIGHT: 243 LBS | HEART RATE: 74 BPM

## 2017-10-18 DIAGNOSIS — IMO0001 UNCONTROLLED TYPE 2 DIABETES MELLITUS WITHOUT COMPLICATION, WITHOUT LONG-TERM CURRENT USE OF INSULIN: Primary | ICD-10-CM

## 2017-10-18 DIAGNOSIS — I10 ESSENTIAL HYPERTENSION: Primary | ICD-10-CM

## 2017-10-18 DIAGNOSIS — E66.9 OBESITY (BMI 35.0-39.9 WITHOUT COMORBIDITY): ICD-10-CM

## 2017-10-18 DIAGNOSIS — E11.9 TYPE II DIABETES MELLITUS, WELL CONTROLLED (HCC): ICD-10-CM

## 2017-10-18 PROCEDURE — 99214 OFFICE O/P EST MOD 30 MIN: CPT | Performed by: FAMILY MEDICINE

## 2017-10-18 RX ORDER — METFORMIN HYDROCHLORIDE 750 MG/1
750 TABLET, EXTENDED RELEASE ORAL DAILY
Qty: 90 TABLET | Refills: 1 | Status: SHIPPED | OUTPATIENT
Start: 2017-10-18 | End: 2017-11-03

## 2017-10-18 RX ORDER — METFORMIN HYDROCHLORIDE 750 MG/1
750 TABLET, EXTENDED RELEASE ORAL
Qty: 30 TABLET | Refills: 3 | Status: SHIPPED | OUTPATIENT
Start: 2017-10-18 | End: 2018-01-26

## 2017-10-18 NOTE — TELEPHONE ENCOUNTER
The order under referrals is for External Diabetes Education. We are listed as Diabetes Services under Referrals Endo/Diabetes.

## 2017-10-18 NOTE — TELEPHONE ENCOUNTER
Pt called to schedule appointment for Diabetes Education. There is not an order in Epic. Please enter order so I can schedule patient to see Calli Reagan.

## 2017-10-18 NOTE — TELEPHONE ENCOUNTER
Patient states he is suppose to have Metformin sent to [facility] not [facility]

## 2017-10-18 NOTE — PROGRESS NOTES
Patient presents with:  Lab Results      Teressa Marlow. is a 67year old year old who presents for recheck of diabetes. Pt has been checking feet on a regular basis. Pt denies any tingling of the feet. Pt denies any sx's of depression.   Pt complains DAILY, Disp: 90 tablet, Rfl: 0  •  fluconazole 200 MG Oral Tab, Take 200 mg by mouth daily. , Disp: , Rfl: 1  •  lisinopril 5 MG Oral Tab, Take 1 tablet (5 mg total) by mouth 2 (two) times daily. , Disp: 180 tablet, Rfl: 3  •  carvedilol 12.5 MG Oral Tab, Ta loss, congestion, sore throat, neck pain and dental problem. Eyes: Negative for pain and visual disturbance. Respiratory: Negative for cough, chest tightness, shortness of breath and wheezing.     Cardiovascular: Negative for chest pain, palpitations a ALPRAZolam (XANAX) 0.5 MG Oral Tab Take 1 tablet (0.5 mg total) by mouth 2 (two) times daily as needed for Sleep.  Disp: 30 tablet Rfl: 0   MELOXICAM 15 MG Oral Tab TAKE 1 TABLET DAILY Disp: 90 tablet Rfl: 0   fluconazole 200 MG Oral Tab Take 200 mg by mo block 11/14/2013   • Left inguinal hernia 3/14/2013    small fat containing   • Morbid obesity ([facility] [address] 4/16/2013   • GIBRAN on CPAP    • Osteoarthrosis, unspecified whether generalized or localized, unspecified site    • Other seborrheic keratosis 6/29/2012   • HISTORY      Comment: BILATERAL A  PMM   No date: OTHER SURGICAL HISTORY      Comment: TRIGGER FINGER RIGHT   No date: OTHER SURGICAL HISTORY      Comment: bowel resection  11/14/2013: OTHER SURGICAL HISTORY      Comment: surgery for perforated viscus  7/2 114/60 (BP Location: Right arm, Patient Position: Sitting, Cuff Size: adult)   Pulse 74   Temp 97.9 °F (36.6 °C) (Oral)   Resp 16   Ht 71\"   Wt 243 lb   SpO2 98%   BMI 33.89 kg/m²   Constitutional: Oriented to person, place, and time. No distress.    HEENT Take 1 tablet (750 mg total) by mouth daily with breakfast.  Dispense: 30 tablet; Refill: 3  - COMP METABOLIC PANEL (14); Future  - LIPID PANEL; Future  - HEMOGLOBIN A1C; Future  - MICROALB/CREAT RATIO, RANDOM URINE;  Future  - DIABETIC EDUCATOR - EXTERNAL

## 2017-10-18 NOTE — TELEPHONE ENCOUNTER
Called [facility] and spoke with [facility]. Advised him of need to cancel Rx at [facility].  He states understanding. Rx already sent to [facility] also.

## 2017-10-18 NOTE — PROGRESS NOTES
Contacted PT to intro to CCM Program. Pt is not ready to enroll now, but requested further info be sent to him. Please send info to Pt and F/U in a few weeks.

## 2017-10-19 ENCOUNTER — TELEPHONE (OUTPATIENT)
Dept: FAMILY MEDICINE CLINIC | Facility: CLINIC | Age: 72
End: 2017-10-19

## 2017-10-27 ENCOUNTER — NURSE ONLY (OUTPATIENT)
Dept: ENDOCRINOLOGY CLINIC | Facility: CLINIC | Age: 72
End: 2017-10-27

## 2017-10-27 VITALS — SYSTOLIC BLOOD PRESSURE: 116 MMHG | BODY MASS INDEX: 34 KG/M2 | DIASTOLIC BLOOD PRESSURE: 64 MMHG | WEIGHT: 246.13 LBS

## 2017-10-27 DIAGNOSIS — E11.9 DIABETES MELLITUS WITHOUT COMPLICATION (HCC): Primary | ICD-10-CM

## 2017-10-27 PROCEDURE — G0108 DIAB MANAGE TRN  PER INDIV: HCPCS | Performed by: DIETITIAN, REGISTERED

## 2017-10-27 RX ORDER — ERGOCALCIFEROL 1.25 MG/1
50000 CAPSULE ORAL WEEKLY
Qty: 4 CAPSULE | Refills: 2 | COMMUNITY
Start: 2017-10-27 | End: 2018-06-11

## 2017-10-27 NOTE — PROGRESS NOTES
Cielo Schumacher.   : 1945 attended Step 1 Diabetic Education:    Date: 10/27/2017   Start time: 9am End time: 10:30am    /64   Wt 246 lb 1.9 oz   BMI 34.33 kg/m²       HGBA1C (%)   Date Value   2014 5.4   ----------  HgbA1C (%)   Ahmet provided for all areas covered. Patient verbalized understanding and has no further questions at this time.     Ronni Jenkins RN, CDE

## 2017-11-03 ENCOUNTER — OFFICE VISIT (OUTPATIENT)
Dept: FAMILY MEDICINE CLINIC | Facility: CLINIC | Age: 72
End: 2017-11-03

## 2017-11-03 VITALS
HEART RATE: 92 BPM | DIASTOLIC BLOOD PRESSURE: 70 MMHG | RESPIRATION RATE: 16 BRPM | SYSTOLIC BLOOD PRESSURE: 118 MMHG | OXYGEN SATURATION: 97 % | TEMPERATURE: 99 F

## 2017-11-03 DIAGNOSIS — R05.9 COUGH: ICD-10-CM

## 2017-11-03 DIAGNOSIS — J01.10 ACUTE NON-RECURRENT FRONTAL SINUSITIS: Primary | ICD-10-CM

## 2017-11-03 PROCEDURE — 99213 OFFICE O/P EST LOW 20 MIN: CPT | Performed by: PHYSICIAN ASSISTANT

## 2017-11-03 RX ORDER — AZITHROMYCIN 250 MG/1
TABLET, FILM COATED ORAL
Qty: 6 TABLET | Refills: 0 | Status: SHIPPED | OUTPATIENT
Start: 2017-11-03 | End: 2017-11-30

## 2017-11-03 RX ORDER — CODEINE PHOSPHATE AND GUAIFENESIN 10; 100 MG/5ML; MG/5ML
5 SOLUTION ORAL EVERY 6 HOURS PRN
Qty: 120 ML | Refills: 0 | Status: SHIPPED | OUTPATIENT
Start: 2017-11-03 | End: 2017-11-13

## 2017-11-03 NOTE — PROGRESS NOTES
CHIEF COMPLAINT:   Patient presents with:  URI        HPI:   Mireya Coronel. is a 67year old male who presents for congestion for 3 days. Sinus pressure and nasal congestion. +fatgiue. Admits to sweats/chills. No temp taken. +achy body.  Admits to sor unspecified, site unspecified 6/29/2012   • Benign neoplasm of colon 5/28/2013    large, adenomatous polyp   • Bilateral renal cysts 3/14/2013   • Calculus of kidney    • Candidiasis of skin 10/24/2013   • [facility]     nonischemic LVEF 45-50% malignant neoplasm of prostate 6/29/2012   • Stricture of sigmoid colon 11/14/2013   • Unspecified intestinal obstruction 10/24/2013    colonic stricture   • Unspecified sleep apnea EDWRAD psg 10/22/13 TX 11-6-13    AHI 33, sao2 76% CPAP 15    • Ventral he if no improvement or if symptoms worsen

## 2017-11-04 ENCOUNTER — TELEPHONE (OUTPATIENT)
Dept: FAMILY MEDICINE CLINIC | Facility: CLINIC | Age: 72
End: 2017-11-04

## 2017-11-04 NOTE — TELEPHONE ENCOUNTER
Patient called and stated he would like recommendation for cough suppressant and decongestant to take as cheratussin is making him too drowsy, I recommended Coricidin given hx of HTN. Pt became angry and states he does not have HTN.   Take as directed per

## 2017-11-07 PROBLEM — M75.81 ROTATOR CUFF TENDINITIS, RIGHT: Status: ACTIVE | Noted: 2017-11-07

## 2017-11-07 PROBLEM — M19.012 OSTEOARTHRITIS OF LEFT GLENOHUMERAL JOINT: Status: ACTIVE | Noted: 2017-11-07

## 2017-11-07 PROBLEM — M19.012 OSTEOARTHRITIS OF LEFT AC (ACROMIOCLAVICULAR) JOINT: Status: ACTIVE | Noted: 2017-11-07

## 2017-11-15 ENCOUNTER — NURSE ONLY (OUTPATIENT)
Dept: ENDOCRINOLOGY CLINIC | Facility: CLINIC | Age: 72
End: 2017-11-15

## 2017-11-15 DIAGNOSIS — E11.9 DIABETES MELLITUS WITHOUT COMPLICATION (HCC): Primary | ICD-10-CM

## 2017-11-15 PROCEDURE — G0109 DIAB MANAGE TRN IND/GROUP: HCPCS | Performed by: DIETITIAN, REGISTERED

## 2017-11-17 NOTE — PROGRESS NOTES
Iman Steward. [id number] attended Step 2 Diabetic Education:  Pt.  Accompanied by wife to visit    Date: 11/16/2017  Start time: 5:30pm End time: 7:30pm    Diabetes Overview, pathophysiology, pre-diabetes, A1C results and treatment options for girish

## 2017-11-29 ENCOUNTER — NURSE ONLY (OUTPATIENT)
Dept: ENDOCRINOLOGY CLINIC | Facility: CLINIC | Age: 72
End: 2017-11-29

## 2017-11-29 DIAGNOSIS — E11.9 DIABETES MELLITUS WITHOUT COMPLICATION (HCC): Primary | ICD-10-CM

## 2017-11-29 PROCEDURE — G0109 DIAB MANAGE TRN IND/GROUP: HCPCS | Performed by: DIETITIAN, REGISTERED

## 2017-11-29 RX ORDER — MELOXICAM 15 MG/1
TABLET ORAL
Qty: 90 TABLET | Refills: 0 | Status: SHIPPED | OUTPATIENT
Start: 2017-11-29 | End: 2018-02-27

## 2017-11-30 ENCOUNTER — HOSPITAL ENCOUNTER (EMERGENCY)
Age: 72
Discharge: HOME | End: 2017-11-30
Attending: EMERGENCY MEDICINE
Payer: OTHER MISCELLANEOUS

## 2017-11-30 ENCOUNTER — APPOINTMENT (OUTPATIENT)
Dept: CT IMAGING | Age: 72
End: 2017-11-30
Attending: EMERGENCY MEDICINE
Payer: OTHER MISCELLANEOUS

## 2017-11-30 ENCOUNTER — TELEPHONE (OUTPATIENT)
Dept: FAMILY MEDICINE CLINIC | Facility: CLINIC | Age: 72
End: 2017-11-30

## 2017-11-30 VITALS
HEIGHT: 71 IN | RESPIRATION RATE: 16 BRPM | SYSTOLIC BLOOD PRESSURE: 113 MMHG | DIASTOLIC BLOOD PRESSURE: 57 MMHG | BODY MASS INDEX: 32.2 KG/M2 | TEMPERATURE: 98 F | HEART RATE: 73 BPM | WEIGHT: 230 LBS | OXYGEN SATURATION: 96 %
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
## 2017-11-30 PROCEDURE — 93010 ELECTROCARDIOGRAM REPORT: CPT

## 2017-11-30 PROCEDURE — 99284 EMERGENCY DEPT VISIT MOD MDM: CPT

## 2017-11-30 PROCEDURE — 93005 ELECTROCARDIOGRAM TRACING: CPT

## 2017-11-30 PROCEDURE — 72125 CT NECK SPINE W/O DYE: CPT | Performed by: EMERGENCY MEDICINE

## 2017-11-30 NOTE — TELEPHONE ENCOUNTER
PSR: Please contact pt to schedule his AWV & explain to him what this is & why he needs it. He is correct, he is not due for a re-check in DM labs until 12/30 or after, & then should f/u with Dr. Tomas Guzman for his DM in January.   He was just here 10/18 for

## 2017-11-30 NOTE — PROGRESS NOTES
Renetta Riley.   [id number] attended Step 3 Diabetic Education:    Date: 11/29/2017  Start time: 5:30pm End time: 7:30pm    Prevention, detection and treatment of chronic complications  Reviewed how to reduce risks of complications including eye, fo
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
## 2017-11-30 NOTE — TELEPHONE ENCOUNTER
AWV patient insists that he was here for that and that he had labs done and was newly diagnosed with Diabetes and so on. He is confused and does not know when he need to follow up for DM and when he needs to have labs drawn again.      Please assistant pa

## 2017-12-01 ENCOUNTER — APPOINTMENT (OUTPATIENT)
Dept: OTHER | Age: 72
End: 2017-12-01
Attending: FAMILY MEDICINE
Payer: OTHER MISCELLANEOUS

## 2017-12-06 NOTE — TELEPHONE ENCOUNTER
PSR: Please contact pt to schedule his AWV & explain to him what this is & why he needs it. He is correct, he is not due for a re-check in DM labs until 12/30 or after, & then should f/u with Dr. Coty Barker for his DM in January.   He was just here 10/18 for

## 2017-12-08 ENCOUNTER — APPOINTMENT (OUTPATIENT)
Dept: OTHER | Age: 72
End: 2017-12-08
Attending: FAMILY MEDICINE
Payer: OTHER MISCELLANEOUS

## 2018-01-03 ENCOUNTER — TELEPHONE (OUTPATIENT)
Dept: FAMILY MEDICINE CLINIC | Facility: CLINIC | Age: 73
End: 2018-01-03

## 2018-01-03 ENCOUNTER — NURSE ONLY (OUTPATIENT)
Dept: ENDOCRINOLOGY CLINIC | Facility: CLINIC | Age: 73
End: 2018-01-03

## 2018-01-03 VITALS — BODY MASS INDEX: 32 KG/M2 | DIASTOLIC BLOOD PRESSURE: 64 MMHG | SYSTOLIC BLOOD PRESSURE: 102 MMHG | WEIGHT: 231.13 LBS

## 2018-01-03 DIAGNOSIS — IMO0001 UNCONTROLLED TYPE 2 DIABETES MELLITUS WITHOUT COMPLICATION, WITHOUT LONG-TERM CURRENT USE OF INSULIN: ICD-10-CM

## 2018-01-03 PROCEDURE — G0108 DIAB MANAGE TRN  PER INDIV: HCPCS | Performed by: DIETITIAN, REGISTERED

## 2018-01-03 NOTE — TELEPHONE ENCOUNTER
Called patient and spoke with him. Advised patient that Rx for vitamin D was filled 9/29/17 for 12 weeks. Patient that he only took 1 month and did not know to refill the medication. Advised patient that he should have 2 additional months of medication.
Patient does not think it should be refilled again. He said the pharmacy keeps refilling it and he does not want it anymore.      ergocalciferol 74258 units Oral Cap
<<----- Click to add NO significant Past Surgical History

## 2018-01-03 NOTE — TELEPHONE ENCOUNTER
Pt was in to see Leesa. Pt requests a call from the nurse. Pt stated multiple reasons and would specify reason for the message. Pls call pt.

## 2018-01-08 NOTE — PROGRESS NOTES
Mireya Coronel. [id number] attended Step 4 Diabetic Education:  Pt.  Accompanied by wife to visit    Date: 1/3/2018  Start time: 5:30pm End time: 6pm      /64   Wt 231 lb 1.9 oz   BMI 32.23 kg/m²   Weight Change:  15 lb wt loss      HGBA1C (%

## 2018-01-11 ENCOUNTER — TELEPHONE (OUTPATIENT)
Dept: ENDOCRINOLOGY CLINIC | Facility: CLINIC | Age: 73
End: 2018-01-11

## 2018-01-11 DIAGNOSIS — E11.9 DIABETES MELLITUS WITHOUT COMPLICATION (HCC): ICD-10-CM

## 2018-01-18 ENCOUNTER — APPOINTMENT (OUTPATIENT)
Dept: LAB | Age: 73
End: 2018-01-18
Attending: FAMILY MEDICINE
Payer: MEDICARE

## 2018-01-18 DIAGNOSIS — E55.9 VITAMIN D DEFICIENCY: ICD-10-CM

## 2018-01-18 DIAGNOSIS — E11.9 TYPE II DIABETES MELLITUS, WELL CONTROLLED (HCC): ICD-10-CM

## 2018-01-18 LAB
25-HYDROXYVITAMIN D (TOTAL): 27.8 NG/ML (ref 30–100)
ALBUMIN SERPL-MCNC: 3.8 G/DL (ref 3.5–4.8)
ALP LIVER SERPL-CCNC: 43 U/L (ref 45–117)
ALT SERPL-CCNC: 21 U/L (ref 17–63)
AST SERPL-CCNC: 12 U/L (ref 15–41)
BILIRUB SERPL-MCNC: 1.1 MG/DL (ref 0.1–2)
BUN BLD-MCNC: 25 MG/DL (ref 8–20)
CALCIUM BLD-MCNC: 8.9 MG/DL (ref 8.3–10.3)
CHLORIDE: 107 MMOL/L (ref 101–111)
CHOLEST SMN-MCNC: 129 MG/DL (ref ?–200)
CO2: 26 MMOL/L (ref 22–32)
CREAT BLD-MCNC: 0.78 MG/DL (ref 0.7–1.3)
CREAT UR-SCNC: 121 MG/DL
EST. AVERAGE GLUCOSE BLD GHB EST-MCNC: 114 MG/DL (ref 68–126)
GLUCOSE BLD-MCNC: 90 MG/DL (ref 70–99)
HBA1C MFR BLD HPLC: 5.6 % (ref ?–5.7)
HDLC SERPL-MCNC: 39 MG/DL (ref 45–?)
HDLC SERPL: 3.31 {RATIO} (ref ?–4.97)
LDLC SERPL CALC-MCNC: 69 MG/DL (ref ?–130)
M PROTEIN MFR SERPL ELPH: 6.9 G/DL (ref 6.1–8.3)
MICROALBUMIN UR-MCNC: 1.01 MG/DL
MICROALBUMIN/CREAT 24H UR-RTO: 8.3 UG/MG (ref ?–30)
NONHDLC SERPL-MCNC: 90 MG/DL (ref ?–130)
POTASSIUM SERPL-SCNC: 4.3 MMOL/L (ref 3.6–5.1)
SODIUM SERPL-SCNC: 140 MMOL/L (ref 136–144)
TRIGL SERPL-MCNC: 104 MG/DL (ref ?–150)
VLDLC SERPL CALC-MCNC: 21 MG/DL (ref 5–40)

## 2018-01-18 PROCEDURE — 36415 COLL VENOUS BLD VENIPUNCTURE: CPT

## 2018-01-18 PROCEDURE — 82043 UR ALBUMIN QUANTITATIVE: CPT

## 2018-01-18 PROCEDURE — 80061 LIPID PANEL: CPT

## 2018-01-18 PROCEDURE — 80053 COMPREHEN METABOLIC PANEL: CPT

## 2018-01-18 PROCEDURE — 83036 HEMOGLOBIN GLYCOSYLATED A1C: CPT

## 2018-01-18 PROCEDURE — 82306 VITAMIN D 25 HYDROXY: CPT

## 2018-01-18 PROCEDURE — 82570 ASSAY OF URINE CREATININE: CPT

## 2018-01-22 ENCOUNTER — OFFICE VISIT (OUTPATIENT)
Dept: FAMILY MEDICINE CLINIC | Facility: CLINIC | Age: 73
End: 2018-01-22

## 2018-01-22 VITALS
HEART RATE: 63 BPM | BODY MASS INDEX: 34.66 KG/M2 | RESPIRATION RATE: 15 BRPM | DIASTOLIC BLOOD PRESSURE: 72 MMHG | OXYGEN SATURATION: 98 % | WEIGHT: 234 LBS | TEMPERATURE: 98 F | HEIGHT: 69 IN | SYSTOLIC BLOOD PRESSURE: 104 MMHG

## 2018-01-22 DIAGNOSIS — D12.6 BENIGN NEOPLASM OF COLON, UNSPECIFIED PART OF COLON: ICD-10-CM

## 2018-01-22 DIAGNOSIS — Z99.89 OSA ON CPAP: ICD-10-CM

## 2018-01-22 DIAGNOSIS — K76.0 FATTY INFILTRATION OF LIVER: ICD-10-CM

## 2018-01-22 DIAGNOSIS — N20.1 RIGHT URETERAL STONE: ICD-10-CM

## 2018-01-22 DIAGNOSIS — K64.8 INTERNAL HEMORRHOIDS: ICD-10-CM

## 2018-01-22 DIAGNOSIS — M12.9 ARTHROPATHY: ICD-10-CM

## 2018-01-22 DIAGNOSIS — N20.0 RECURRENT KIDNEY STONES: ICD-10-CM

## 2018-01-22 DIAGNOSIS — N20.0 CALCULUS OF KIDNEY: ICD-10-CM

## 2018-01-22 DIAGNOSIS — D17.0 LIPOMA OF NECK: ICD-10-CM

## 2018-01-22 DIAGNOSIS — G47.33 OSA ON CPAP: ICD-10-CM

## 2018-01-22 DIAGNOSIS — K57.30 DIVERTICULOSIS OF LARGE INTESTINE WITHOUT HEMORRHAGE: ICD-10-CM

## 2018-01-22 DIAGNOSIS — M75.81 ROTATOR CUFF TENDINITIS, RIGHT: ICD-10-CM

## 2018-01-22 DIAGNOSIS — E08.9 DIABETES MELLITUS DUE TO UNDERLYING CONDITION, CONTROLLED, WITHOUT COMPLICATION, WITHOUT LONG-TERM CURRENT USE OF INSULIN (HCC): ICD-10-CM

## 2018-01-22 DIAGNOSIS — L82.1 OTHER SEBORRHEIC KERATOSIS: ICD-10-CM

## 2018-01-22 DIAGNOSIS — I42.9 CARDIOMYOPATHY, UNSPECIFIED TYPE (HCC): ICD-10-CM

## 2018-01-22 DIAGNOSIS — Z00.00 MEDICARE ANNUAL WELLNESS VISIT, SUBSEQUENT: Primary | ICD-10-CM

## 2018-01-22 DIAGNOSIS — N40.1 BENIGN NON-NODULAR PROSTATIC HYPERPLASIA WITH LOWER URINARY TRACT SYMPTOMS: ICD-10-CM

## 2018-01-22 DIAGNOSIS — M19.012 OSTEOARTHRITIS OF LEFT GLENOHUMERAL JOINT: ICD-10-CM

## 2018-01-22 DIAGNOSIS — I10 ESSENTIAL HYPERTENSION: ICD-10-CM

## 2018-01-22 DIAGNOSIS — G47.33 OSA TREATED WITH BIPAP: ICD-10-CM

## 2018-01-22 DIAGNOSIS — K63.5 POLYP OF COLON, UNSPECIFIED PART OF COLON, UNSPECIFIED TYPE: ICD-10-CM

## 2018-01-22 DIAGNOSIS — I44.7 LEFT BUNDLE BRANCH BLOCK: ICD-10-CM

## 2018-01-22 DIAGNOSIS — M17.10 PRIMARY LOCALIZED OSTEOARTHROSIS OF LOWER LEG, UNSPECIFIED LATERALITY: ICD-10-CM

## 2018-01-22 DIAGNOSIS — K76.89 HEPATIC CYST: ICD-10-CM

## 2018-01-22 DIAGNOSIS — N28.1 BILATERAL RENAL CYSTS: ICD-10-CM

## 2018-01-22 PROBLEM — B36.9 FUNGAL DERMATITIS: Status: RESOLVED | Noted: 2017-05-16 | Resolved: 2018-01-22

## 2018-01-22 PROCEDURE — G0439 PPPS, SUBSEQ VISIT: HCPCS | Performed by: FAMILY MEDICINE

## 2018-01-22 PROCEDURE — 99214 OFFICE O/P EST MOD 30 MIN: CPT | Performed by: FAMILY MEDICINE

## 2018-01-22 NOTE — PROGRESS NOTES
HPI:   Iman Steward. is a 67year old male who presents for a Medicare Subsequent Annual Wellness visit (Pt already had Initial Annual Wellness).       His last annual assessment has been over 1 year: Annual Physical due on 09/22/2017         Fall/Ri Todd Lyons MD (CARDIOLOGY)    Patient Active Problem List:  1. Medicare annual wellness visit, subsequent  -wellness visit was done. 2. Left bundle branch block  -stable, no complaints.      3. Diabetes mellitus due to underlying condition, controlled, w 01/18/2018   HDL 39 (L) 01/18/2018   LDL 69 01/18/2018   TRIG 104 01/18/2018          Last Chemistry Labs:     Lab Results  Component Value Date   AST 12 (L) 01/18/2018   ALT 21 01/18/2018   CA 8.9 01/18/2018   ALB 3.8 01/18/2018   TSH 1.120 09/22/2016   C Benign neoplasm of colon (5/28/2013); Bilateral renal cysts (3/14/2013); Calculus of kidney; Candidiasis of skin (10/24/2013); Cardiomyopathy ([facility] Cellulitis and abscess of face (6/29/2012); Change in stool (9/18/2013); Colon polyp (5/2/2013);  Constipati orthopedic surg (pbp); i&d rectal submucosal abscess; angiogram (9/17/13); colonoscopy (5/2/2013, 10/1/2013, 2/1/2013); repair of hydrocele,tunica (2000); part removal colon w end colostomy (); Ventral hernia repair (10/3/2014);  Colectomy; colonosco obvious abnormality, atraumatic   Eyes:  PERRL, conjunctiva/corneas clear, EOM's intact, both eyes   Ears:  Normal TM's and external ear canals, both ears   Nose: Nares normal, septum midline, mucosa normal, no drainage or sinus tenderness   Throat: Lips, METABOLIC PANEL (14), LIPID PANEL        -improved, due for Eye exam.     (M17.10) Primary localized osteoarthrosis of lower leg, unspecified laterality  Plan: -stable, CPM    (L82.1) Other seborrheic keratosis  Plan: -stable, CPM    (M12.9) Arthro current health state?: Good  How do you maintain positive mental well-being?: Social Interaction    This section provided for quick review of chart, separate sheet to patient  [address] Internal Lab or Procedure Extern Hepatitis B for Moderate/High Risk No vaccine history found Medium/high risk factors:   End-stage renal disease   Hemophiliacs who received Factor VIII or IX concentrates   Clients of institutions for the mentally retarded   Persons who live in the same

## 2018-01-24 ENCOUNTER — TELEPHONE (OUTPATIENT)
Dept: FAMILY MEDICINE CLINIC | Facility: CLINIC | Age: 73
End: 2018-01-24

## 2018-01-24 DIAGNOSIS — E11.9 TYPE II DIABETES MELLITUS, WELL CONTROLLED (HCC): ICD-10-CM

## 2018-01-24 DIAGNOSIS — E55.9 VITAMIN D DEFICIENCY: Primary | ICD-10-CM

## 2018-01-24 RX ORDER — ERGOCALCIFEROL 1.25 MG/1
50000 CAPSULE ORAL WEEKLY
Qty: 4 CAPSULE | Refills: 2 | Status: SHIPPED | OUTPATIENT
Start: 2018-01-24 | End: 2018-05-18

## 2018-01-24 NOTE — TELEPHONE ENCOUNTER
----- Message from Breana Grayson MD sent at 1/21/2018  6:43 PM CST -----  Patient's in diabetic range, have him cut tablet(metformin) in half, recheck A1C in 3 months. If still in good range, we can stop metformin. Recheck vitamin D in 6 months .

## 2018-01-24 NOTE — TELEPHONE ENCOUNTER
Discussed low vitamin D with patient who states he is still taking his last RX dose of vitaminD which he only took one month of.   Advised him to take a total of 3 months of weekly high dose of vitamin D starting now and then take over the counter vitamin D

## 2018-01-24 NOTE — TELEPHONE ENCOUNTER
----- Message from Ron Matos PA-C sent at 1/19/2018  3:29 PM CST -----  Improved vitamin D level since starting Vitamin D3 50,000 IU once weekly.  Please encourage patient to complete the full 3 months of this therapy, and once complete, he is to ta

## 2018-01-26 RX ORDER — METFORMIN HYDROCHLORIDE 750 MG/1
375 TABLET, EXTENDED RELEASE ORAL
Qty: 30 TABLET | COMMUNITY
Start: 2018-01-26 | End: 2018-04-15

## 2018-01-26 NOTE — TELEPHONE ENCOUNTER
Patient was seen in the office on 1/22/18. Metformin dose was discussed with him during office visit. Medication list updated to reflect that he is taking 1/2 tablet at this time.

## 2018-02-27 RX ORDER — MELOXICAM 15 MG/1
TABLET ORAL
Qty: 90 TABLET | Refills: 0 | Status: SHIPPED | OUTPATIENT
Start: 2018-02-27 | End: 2018-05-28

## 2018-02-27 NOTE — TELEPHONE ENCOUNTER
Medication(s) to Refill:   Pending Prescriptions Disp Refills    MELOXICAM 15 MG Oral Tab [Pharmacy Med Name: MELOXICAM TABS 15MG] 90 tablet 0     Sig: TAKE 1 TABLET DAILY             Reason for Medication Refill being sent to Provider / Reason Protocol Fa

## 2018-03-01 ENCOUNTER — PATIENT OUTREACH (OUTPATIENT)
Dept: FAMILY MEDICINE CLINIC | Facility: CLINIC | Age: 73
End: 2018-03-01

## 2018-04-02 ENCOUNTER — TELEPHONE (OUTPATIENT)
Dept: ENDOCRINOLOGY CLINIC | Facility: CLINIC | Age: 73
End: 2018-04-02

## 2018-04-02 DIAGNOSIS — E11.9 DIABETES MELLITUS WITHOUT COMPLICATION (HCC): ICD-10-CM

## 2018-04-02 NOTE — TELEPHONE ENCOUNTER
Pt. was agreeable w/changing appt. time to 5:30pm on 4/11/18. He also mentioned he needs a new prescription sent to [facility] for diabetes testing supplies . Refill escribed today.

## 2018-04-03 ENCOUNTER — TELEPHONE (OUTPATIENT)
Dept: ENDOCRINOLOGY CLINIC | Facility: CLINIC | Age: 73
End: 2018-04-03

## 2018-04-03 DIAGNOSIS — E11.9 DIABETES MELLITUS WITHOUT COMPLICATION (HCC): ICD-10-CM

## 2018-04-11 ENCOUNTER — NURSE ONLY (OUTPATIENT)
Dept: ENDOCRINOLOGY CLINIC | Facility: CLINIC | Age: 73
End: 2018-04-11

## 2018-04-11 VITALS — WEIGHT: 236.13 LBS | BODY MASS INDEX: 35 KG/M2

## 2018-04-11 DIAGNOSIS — E11.9 TYPE II DIABETES MELLITUS, WELL CONTROLLED (HCC): ICD-10-CM

## 2018-04-11 PROCEDURE — G0108 DIAB MANAGE TRN  PER INDIV: HCPCS | Performed by: DIETITIAN, REGISTERED

## 2018-04-12 RX ORDER — ERGOCALCIFEROL 1.25 MG/1
CAPSULE ORAL
Qty: 4 CAPSULE | Refills: 0 | OUTPATIENT
Start: 2018-04-12

## 2018-04-13 NOTE — PROGRESS NOTES
Simón Zimmerman.   : 1945 was seen for Diabetic Education Follow up:    Date: 2018  Referring MD: Dr. Ernesto Sandoval  Start time: 5:30 End time: 6:00pm    Assessment:     Assessment: Wt 236 lb 1.6 oz   BMI 34.87 kg/m²        HGBA1C (%)   Date V Effects/Interactions/Precautions/Missed Doses: Decreases glucose production by the liver. May cause G.I. Side effectsif not taken with food. If dose missed, wait until next meal. Hold meds if tests ordered requiring contrast media.     PROBLEM SOLVING:  Rev 8.3 <=30.0 ug/mg       Recommendations:      1. Follow recommended meal plan; return to counting carbs & eating healthy snacks   2. Test BG fasting, premeal/2 hours post meals once daily   3. Bring glucose logs/meter to all provider visits for review.    4.

## 2018-04-15 RX ORDER — METFORMIN HYDROCHLORIDE 750 MG/1
750 TABLET, EXTENDED RELEASE ORAL
Qty: 30 TABLET | Refills: 0 | Status: SHIPPED | OUTPATIENT
Start: 2018-04-15 | End: 2018-05-15

## 2018-05-15 DIAGNOSIS — E11.9 TYPE II DIABETES MELLITUS, WELL CONTROLLED (HCC): ICD-10-CM

## 2018-05-15 RX ORDER — METFORMIN HYDROCHLORIDE 750 MG/1
750 TABLET, EXTENDED RELEASE ORAL
Qty: 30 TABLET | Refills: 0 | Status: SHIPPED | OUTPATIENT
Start: 2018-05-15 | End: 2018-06-11

## 2018-05-18 ENCOUNTER — HOSPITAL ENCOUNTER (EMERGENCY)
Age: 73
Discharge: HOME | End: 2018-05-18
Attending: EMERGENCY MEDICINE
Payer: MEDICARE

## 2018-05-18 VITALS
RESPIRATION RATE: 18 BRPM | HEART RATE: 71 BPM | OXYGEN SATURATION: 95 % | BODY MASS INDEX: 32.21 KG/M2 | SYSTOLIC BLOOD PRESSURE: 103 MMHG | TEMPERATURE: 98 F | DIASTOLIC BLOOD PRESSURE: 63 MMHG | WEIGHT: 225 LBS | HEIGHT: 70 IN

## 2018-05-18 DIAGNOSIS — R25.2 LEG CRAMPING: Primary | ICD-10-CM

## 2018-05-18 PROCEDURE — 99284 EMERGENCY DEPT VISIT MOD MDM: CPT

## 2018-05-18 PROCEDURE — 80053 COMPREHEN METABOLIC PANEL: CPT | Performed by: PHYSICIAN ASSISTANT

## 2018-05-18 PROCEDURE — 81003 URINALYSIS AUTO W/O SCOPE: CPT | Performed by: EMERGENCY MEDICINE

## 2018-05-18 PROCEDURE — 99283 EMERGENCY DEPT VISIT LOW MDM: CPT

## 2018-05-18 PROCEDURE — 85025 COMPLETE CBC W/AUTO DIFF WBC: CPT | Performed by: PHYSICIAN ASSISTANT

## 2018-05-18 PROCEDURE — 85378 FIBRIN DEGRADE SEMIQUANT: CPT | Performed by: PHYSICIAN ASSISTANT

## 2018-05-18 PROCEDURE — 83735 ASSAY OF MAGNESIUM: CPT | Performed by: PHYSICIAN ASSISTANT

## 2018-05-18 PROCEDURE — 36415 COLL VENOUS BLD VENIPUNCTURE: CPT

## 2018-05-18 RX ORDER — DIAZEPAM 2 MG/1
2 TABLET ORAL EVERY 12 HOURS PRN
Qty: 20 TABLET | Refills: 0 | Status: SHIPPED | OUTPATIENT
Start: 2018-05-18 | End: 2018-05-25

## 2018-05-18 NOTE — ED PROVIDER NOTES
I reviewed that chart and discussed the case with the physician assistant. I have examined the patient and noted patient with normal baseline workup. Will give a few Valium for cramping. Have him follow-up with primary. .    I agree with the physician as

## 2018-05-18 NOTE — ED PROVIDER NOTES
Patient Seen in: [address] [facility] In [place]    History   Patient presents with:  Musculoskeletal Problem    Stated Complaint: BOTH HANDS AND FEET CRAMPING X FEW WEEKS    70-year-old  male with a history of chronic cramping to his hernia 3/14/2013    small fat containing   • Morbid obesity ([facility] [address] 4/16/2013   • GIBRAN on CPAP    • Osteoarthrosis, unspecified whether generalized or localized, unspecified site    • Other seborrheic keratosis 6/29/2012   • Perforated sigmoid colon ([facility] [address] 11/ PMM   No date: OTHER SURGICAL HISTORY      Comment: TRIGGER FINGER RIGHT   No date: OTHER SURGICAL HISTORY      Comment: bowel resection  11/14/2013: OTHER SURGICAL HISTORY      Comment: surgery for perforated viscus  7/24/2013: OTHER SURGICAL HISTORY CRAMPING X FEW WEEKS  Other systems are as noted in HPI. Constitutional and vital signs reviewed. All other systems reviewed and negative except as noted above.     Physical Exam   ED Triage Vitals [05/18/18 1143]  BP: 125/74  Pulse: 79  Resp: 18  Tem W/ DIFFERENTIAL[[id number]]                              Final result                 Please view results for these tests on the individual orders.    URINALYSIS WITH CULTURE REFLEX   CBC W/ DIFFERENTIAL       ED Course as of May 18 1321  -------------------

## 2018-05-23 ENCOUNTER — LAB ENCOUNTER (OUTPATIENT)
Dept: LAB | Age: 73
End: 2018-05-23
Attending: FAMILY MEDICINE
Payer: MEDICARE

## 2018-05-23 ENCOUNTER — OFFICE VISIT (OUTPATIENT)
Dept: FAMILY MEDICINE CLINIC | Facility: CLINIC | Age: 73
End: 2018-05-23

## 2018-05-23 VITALS
WEIGHT: 230 LBS | BODY MASS INDEX: 34.07 KG/M2 | RESPIRATION RATE: 16 BRPM | DIASTOLIC BLOOD PRESSURE: 60 MMHG | TEMPERATURE: 98 F | SYSTOLIC BLOOD PRESSURE: 100 MMHG | HEIGHT: 69 IN | HEART RATE: 72 BPM

## 2018-05-23 DIAGNOSIS — I10 ESSENTIAL HYPERTENSION: ICD-10-CM

## 2018-05-23 DIAGNOSIS — Z12.11 SCREEN FOR COLON CANCER: ICD-10-CM

## 2018-05-23 DIAGNOSIS — E08.9 DIABETES MELLITUS DUE TO UNDERLYING CONDITION, CONTROLLED, WITHOUT COMPLICATION, WITHOUT LONG-TERM CURRENT USE OF INSULIN (HCC): ICD-10-CM

## 2018-05-23 DIAGNOSIS — E11.9 TYPE II DIABETES MELLITUS, WELL CONTROLLED (HCC): Primary | ICD-10-CM

## 2018-05-23 DIAGNOSIS — R25.2 BILATERAL LEG CRAMPS: ICD-10-CM

## 2018-05-23 PROCEDURE — 80061 LIPID PANEL: CPT

## 2018-05-23 PROCEDURE — 36415 COLL VENOUS BLD VENIPUNCTURE: CPT

## 2018-05-23 PROCEDURE — 99214 OFFICE O/P EST MOD 30 MIN: CPT | Performed by: FAMILY MEDICINE

## 2018-05-23 PROCEDURE — 83036 HEMOGLOBIN GLYCOSYLATED A1C: CPT

## 2018-05-23 PROCEDURE — 80053 COMPREHEN METABOLIC PANEL: CPT

## 2018-05-26 NOTE — PROGRESS NOTES
Patient presents with:  Cramps: bilateral leg and hand cramping noticed a few weeks ago      Gini Medley is a 67year old year old who presents for recheck of diabetes. Pt has been checking feet on a regular basis.  Pt denies any tingling of the f MELOXICAM 15 MG Oral Tab, TAKE 1 TABLET DAILY, Disp: 90 tablet, Rfl: 0  •  lisinopril 5 MG Oral Tab, Take 1 tablet (5 mg total) by mouth 2 (two) times daily. , Disp: 180 tablet, Rfl: 3  •  carvedilol 12.5 MG Oral Tab, Take 1 tablet (12.5 mg total) by mouth Administered Date(s) Administered   • Celestone Soluspan 3mg  05/23/2012   • Fluzone Vaccine Medicare () 11/10/2016   • Influenza 10/25/2013   • Kenalog Per 10mg Inj 11/07/2017, 05/03/2018, 05/03/2018       Review of Systems   Constitutional: Everardo Dobbs (750 MG TOTAL) BY MOUTH DAILY WITH BREAKFAST. Disp: 30 tablet Rfl: 0   Blood Glucose Calibration (SHARMIN CONTOUR NEXT CONTROL) Low In Vitro Solution 1 drop by In Vitro route as needed.  Test 1st strip out of each new vial;discard solution 90 days after openi Glucose intolerance (pre-diabetes)    • Hepatic cyst 3/14/2013    Lt hepatic cysts   • High blood pressure    • History of blood transfusion 2001   • HTN (hypertension) 11/16/2013   • Internal hemorrhoids 5/2/2013   • Kidney stone     nephrolithiasis, 3/14 10/1/2013 colonoscopy w/ dilatation of                anastomotic stricture  No date: COLONOSCOPY  No date: HERNIA SURGERY      Comment: umbilical and right inguinal  2005: HERNIA SURGERY      Comment: hernia repair  1/2017: HERNIA SURGERY      Comment: ve REPAIR;  Surgeon:                Dayday Chavez MD;  Location: [facility] MAIN OR  Family History   Problem Relation Age of Onset   • Other [OTHER] Father      water on the brain   • Diabetes Mother    • Diabetes Brother    • Diabetes Brother    • Hypertension Bro erythema. No pallor. Psychiatric: Normal mood and affect. A/P:    1. Screen for colon cancer  -due for c-scope  - SURGERY - INTERNAL    2.  Type II diabetes mellitus, well controlled ([facility] [address]  -due for eye exam, improving, will decrease metformin based o

## 2018-05-29 ENCOUNTER — TELEPHONE (OUTPATIENT)
Dept: FAMILY MEDICINE CLINIC | Facility: CLINIC | Age: 73
End: 2018-05-29

## 2018-05-29 RX ORDER — MELOXICAM 15 MG/1
TABLET ORAL
Qty: 90 TABLET | Refills: 0 | Status: SHIPPED | OUTPATIENT
Start: 2018-05-29 | End: 2018-06-11

## 2018-05-29 NOTE — TELEPHONE ENCOUNTER
----- Message from Jaydon Koenig MD sent at 5/27/2018  1:00 PM CDT -----  Prediabetic range, ok to do trial of metformin (regular) once a day, he can finish off the ER if he has a lot. Recheck in 3 months.

## 2018-05-30 ENCOUNTER — MED REC SCAN ONLY (OUTPATIENT)
Dept: FAMILY MEDICINE CLINIC | Facility: CLINIC | Age: 73
End: 2018-05-30

## 2018-06-05 ENCOUNTER — OFFICE VISIT (OUTPATIENT)
Dept: SURGERY | Facility: CLINIC | Age: 73
End: 2018-06-05

## 2018-06-05 VITALS
WEIGHT: 230 LBS | TEMPERATURE: 99 F | DIASTOLIC BLOOD PRESSURE: 78 MMHG | SYSTOLIC BLOOD PRESSURE: 108 MMHG | HEART RATE: 95 BPM | BODY MASS INDEX: 34 KG/M2

## 2018-06-05 DIAGNOSIS — S31.109A CHRONIC ABDOMINAL WOUND INFECTION, INITIAL ENCOUNTER: Primary | ICD-10-CM

## 2018-06-05 DIAGNOSIS — L08.9 CHRONIC ABDOMINAL WOUND INFECTION, INITIAL ENCOUNTER: Primary | ICD-10-CM

## 2018-06-05 DIAGNOSIS — Z99.89 OSA ON CPAP: ICD-10-CM

## 2018-06-05 DIAGNOSIS — I42.9 CARDIOMYOPATHY, UNSPECIFIED TYPE (HCC): ICD-10-CM

## 2018-06-05 DIAGNOSIS — T14.8XXA OPEN WOUND OF SKIN: Primary | ICD-10-CM

## 2018-06-05 DIAGNOSIS — I10 ESSENTIAL HYPERTENSION: ICD-10-CM

## 2018-06-05 DIAGNOSIS — I44.7 LEFT BUNDLE BRANCH BLOCK: ICD-10-CM

## 2018-06-05 DIAGNOSIS — G47.33 OSA ON CPAP: ICD-10-CM

## 2018-06-05 PROCEDURE — 87075 CULTR BACTERIA EXCEPT BLOOD: CPT | Performed by: COLON & RECTAL SURGERY

## 2018-06-05 PROCEDURE — 87070 CULTURE OTHR SPECIMN AEROBIC: CPT | Performed by: COLON & RECTAL SURGERY

## 2018-06-05 PROCEDURE — 87205 SMEAR GRAM STAIN: CPT | Performed by: COLON & RECTAL SURGERY

## 2018-06-05 PROCEDURE — 87076 CULTURE ANAEROBE IDENT EACH: CPT | Performed by: COLON & RECTAL SURGERY

## 2018-06-05 PROCEDURE — 99215 OFFICE O/P EST HI 40 MIN: CPT | Performed by: COLON & RECTAL SURGERY

## 2018-06-05 NOTE — PROGRESS NOTES
Follow Up Visit Note       Active Problems      1. Chronic abdominal wound infection, initial encounter    2. Cardiomyopathy, unspecified type ([facility] [address]    3. Essential hypertension    4. Left bundle branch block    5.  GIBRAN on CPAP          Chief Complaint   Pa is near a previous midline vertical incision. In my office today, the patient started draining a mucopurulent material.      My total face time with this patient was 45 minutes.   Greater than half of our visit was spent in counseling the patient on the ab Osteoarthrosis, unspecified whether generalized or localized, unspecified site    • Other seborrheic keratosis 6/29/2012   • Perforated sigmoid colon ([facility] [address] 11/14/2013   • Perforated viscus 11/14/2013   • Plantar wart 6/29/2012   • Pleural effusion 11/25/20 HISTORY      Comment: bowel resection  11/14/2013: OTHER SURGICAL HISTORY      Comment: surgery for perforated viscus  7/24/2013: OTHER SURGICAL HISTORY      Comment: colon resection with LAR of rectosigmoid colon               and mobilization of splenic Packs/day: 1.00      Years: 12.00          Quit date: 1/1/1975    Smokeless tobacco: Never Used                        Alcohol use: Yes           0.6 oz/week       Glasses of wine: 1 per week and trouble swallowing. Respiratory: Negative for apnea, cough, shortness of breath and wheezing. Cardiovascular: Negative for chest pain, palpitations and leg swelling.    Gastrointestinal: Negative for abdominal distention, abdominal pain, anal blee [facility])  Essential hypertension  Left bundle branch block  GIBRAN on CPAP    Plan   I am seeing this patient in consultation from the primary care service regarding a new open wound at the site of previous intervention.     This patient had a previous ventral in incision at its medial aspect, there is a black eschar that is 3 cm in width, 2 cm in height, it is surrounded by red irritated skin.   I was able to pop and drain an abscess that apparently is below skin level and collect material for cultures and sensitiv were fully discussed with the patient. All questions from the patient were answered in detail. A description of the procedure and it's possible outcomes was fully discussed. The patient seemed to understand the conversation and its details.     Consent f

## 2018-06-05 NOTE — PATIENT INSTRUCTIONS
I am seeing this patient in consultation from the primary care service regarding a new open wound at the site of previous intervention. This patient had a previous ventral incisional herniorrhaphy with mesh.   He has recurred with some hernias around the cm in height, it is surrounded by red irritated skin. I was able to pop and drain an abscess that apparently is below skin level and collect material for cultures and sensitivities that will be sent to [facility].  This did relieve some of the bump. answered in detail. A description of the procedure and it's possible outcomes was fully discussed. The patient seemed to understand the conversation and its details. Consent for surgery was confirmed with the patient.

## 2018-06-06 ENCOUNTER — LAB REQUISITION (OUTPATIENT)
Dept: LAB | Facility: HOSPITAL | Age: 73
End: 2018-06-06
Attending: COLON & RECTAL SURGERY
Payer: MEDICARE

## 2018-06-06 DIAGNOSIS — Z00.00 ENCOUNTER FOR GENERAL ADULT MEDICAL EXAMINATION WITHOUT ABNORMAL FINDINGS: ICD-10-CM

## 2018-06-06 PROCEDURE — 87076 CULTURE ANAEROBE IDENT EACH: CPT | Performed by: COLON & RECTAL SURGERY

## 2018-06-06 PROCEDURE — 87205 SMEAR GRAM STAIN: CPT | Performed by: COLON & RECTAL SURGERY

## 2018-06-06 PROCEDURE — 87077 CULTURE AEROBIC IDENTIFY: CPT | Performed by: COLON & RECTAL SURGERY

## 2018-06-06 PROCEDURE — 87075 CULTR BACTERIA EXCEPT BLOOD: CPT | Performed by: COLON & RECTAL SURGERY

## 2018-06-06 PROCEDURE — 87070 CULTURE OTHR SPECIMN AEROBIC: CPT | Performed by: COLON & RECTAL SURGERY

## 2018-06-08 ENCOUNTER — TELEPHONE (OUTPATIENT)
Dept: SURGERY | Facility: CLINIC | Age: 73
End: 2018-06-08

## 2018-06-08 NOTE — TELEPHONE ENCOUNTER
Pt had infected wound with surgery 6/6 and found to have infected mesh. Pt on antibiotic and wound is draining and he thinks there is a \"slight greenish tinge\" to this today. Denies fever or chills or pain. Pt has appt next week.  Re-assured pt and instru

## 2018-06-11 ENCOUNTER — ANESTHESIA EVENT (OUTPATIENT)
Dept: SURGERY | Facility: HOSPITAL | Age: 73
End: 2018-06-11

## 2018-06-11 ENCOUNTER — HOSPITAL ENCOUNTER (INPATIENT)
Facility: HOSPITAL | Age: 73
LOS: 11 days | Discharge: HOME | DRG: 907 | End: 2018-06-22
Attending: EMERGENCY MEDICINE | Admitting: COLON & RECTAL SURGERY
Payer: MEDICARE

## 2018-06-11 ENCOUNTER — ANESTHESIA (OUTPATIENT)
Dept: SURGERY | Facility: HOSPITAL | Age: 73
End: 2018-06-11

## 2018-06-11 ENCOUNTER — SURGERY (OUTPATIENT)
Age: 73
End: 2018-06-11

## 2018-06-11 DIAGNOSIS — T85.79XA: ICD-10-CM

## 2018-06-11 DIAGNOSIS — L02.91 ABSCESS: Primary | ICD-10-CM

## 2018-06-11 LAB
ANTIBODY SCREEN: NEGATIVE
GLUCOSE BLD-MCNC: 125 MG/DL (ref 65–99)
GLUCOSE BLD-MCNC: 98 MG/DL (ref 65–99)
RH BLOOD TYPE: POSITIVE

## 2018-06-11 PROCEDURE — 0WUF0KZ SUPPLEMENT ABDOMINAL WALL WITH NONAUTOLOGOUS TISSUE SUBSTITUTE, OPEN APPROACH: ICD-10-PCS | Performed by: COLON & RECTAL SURGERY

## 2018-06-11 PROCEDURE — 49565 REPAIR RECURR INCIS HERNIA,REDUC: CPT | Performed by: COLON & RECTAL SURGERY

## 2018-06-11 PROCEDURE — 49568 REPAIR INCIS HERNIA W MESH: CPT | Performed by: COLON & RECTAL SURGERY

## 2018-06-11 PROCEDURE — 99223 1ST HOSP IP/OBS HIGH 75: CPT | Performed by: COLON & RECTAL SURGERY

## 2018-06-11 PROCEDURE — 0WPF0JZ REMOVAL OF SYNTHETIC SUBSTITUTE FROM ABDOMINAL WALL, OPEN APPROACH: ICD-10-PCS | Performed by: COLON & RECTAL SURGERY

## 2018-06-11 PROCEDURE — 0DBA0ZZ EXCISION OF JEJUNUM, OPEN APPROACH: ICD-10-PCS | Performed by: COLON & RECTAL SURGERY

## 2018-06-11 DEVICE — PHASIX MESH, 6" X 8" (15.2 CM X 20.3 CM), RECTANGLE
Type: IMPLANTABLE DEVICE | Site: ABDOMEN | Status: FUNCTIONAL
Brand: PHASIX

## 2018-06-11 RX ORDER — HEPARIN SODIUM 5000 [USP'U]/ML
5000 INJECTION, SOLUTION INTRAVENOUS; SUBCUTANEOUS ONCE
Status: DISCONTINUED | OUTPATIENT
Start: 2018-06-11 | End: 2018-06-11 | Stop reason: HOSPADM

## 2018-06-11 RX ORDER — ONDANSETRON 2 MG/ML
4 INJECTION INTRAMUSCULAR; INTRAVENOUS EVERY 6 HOURS PRN
Status: DISCONTINUED | OUTPATIENT
Start: 2018-06-11 | End: 2018-06-11 | Stop reason: HOSPADM

## 2018-06-11 RX ORDER — CEFAZOLIN SODIUM/WATER 2 G/20 ML
2 SYRINGE (ML) INTRAVENOUS EVERY 8 HOURS
Status: DISCONTINUED | OUTPATIENT
Start: 2018-06-11 | End: 2018-06-22

## 2018-06-11 RX ORDER — FAMOTIDINE 10 MG/ML
20 INJECTION, SOLUTION INTRAVENOUS 2 TIMES DAILY
Status: DISCONTINUED | OUTPATIENT
Start: 2018-06-11 | End: 2018-06-22

## 2018-06-11 RX ORDER — NALOXONE HYDROCHLORIDE 0.4 MG/ML
80 INJECTION, SOLUTION INTRAMUSCULAR; INTRAVENOUS; SUBCUTANEOUS AS NEEDED
Status: DISCONTINUED | OUTPATIENT
Start: 2018-06-11 | End: 2018-06-11 | Stop reason: HOSPADM

## 2018-06-11 RX ORDER — CLINDAMYCIN PHOSPHATE 900 MG/50ML
900 INJECTION INTRAVENOUS ONCE
Status: DISCONTINUED | OUTPATIENT
Start: 2018-06-11 | End: 2018-06-11 | Stop reason: HOSPADM

## 2018-06-11 RX ORDER — LABETALOL HYDROCHLORIDE 5 MG/ML
5 INJECTION, SOLUTION INTRAVENOUS EVERY 5 MIN PRN
Status: DISCONTINUED | OUTPATIENT
Start: 2018-06-11 | End: 2018-06-11 | Stop reason: HOSPADM

## 2018-06-11 RX ORDER — ACETAMINOPHEN 10 MG/ML
1000 INJECTION, SOLUTION INTRAVENOUS ONCE
Status: COMPLETED | OUTPATIENT
Start: 2018-06-11 | End: 2018-06-11

## 2018-06-11 RX ORDER — LISINOPRIL 5 MG/1
5 TABLET ORAL 2 TIMES DAILY
COMMUNITY
End: 2018-07-11

## 2018-06-11 RX ORDER — SULFAMETHOXAZOLE AND TRIMETHOPRIM 800; 160 MG/1; MG/1
1 TABLET ORAL 2 TIMES DAILY
Refills: 1 | Status: ON HOLD | COMMUNITY
Start: 2018-06-06 | End: 2018-06-22

## 2018-06-11 RX ORDER — ACETAMINOPHEN 500 MG
1000 TABLET ORAL ONCE
Status: DISCONTINUED | OUTPATIENT
Start: 2018-06-11 | End: 2018-06-11 | Stop reason: HOSPADM

## 2018-06-11 RX ORDER — KETOROLAC TROMETHAMINE 15 MG/ML
15 INJECTION, SOLUTION INTRAMUSCULAR; INTRAVENOUS EVERY 6 HOURS
Status: COMPLETED | OUTPATIENT
Start: 2018-06-11 | End: 2018-06-12

## 2018-06-11 RX ORDER — MAGNESIUM CARB/ALUMINUM HYDROX 105-160MG
296 TABLET,CHEWABLE ORAL ONCE
Status: DISCONTINUED | OUTPATIENT
Start: 2018-06-11 | End: 2018-06-11 | Stop reason: HOSPADM

## 2018-06-11 RX ORDER — SODIUM CHLORIDE, SODIUM LACTATE, POTASSIUM CHLORIDE, CALCIUM CHLORIDE 600; 310; 30; 20 MG/100ML; MG/100ML; MG/100ML; MG/100ML
INJECTION, SOLUTION INTRAVENOUS CONTINUOUS
Status: DISCONTINUED | OUTPATIENT
Start: 2018-06-11 | End: 2018-06-19

## 2018-06-11 RX ORDER — HYDROMORPHONE HYDROCHLORIDE 1 MG/ML
0.5 INJECTION, SOLUTION INTRAMUSCULAR; INTRAVENOUS; SUBCUTANEOUS EVERY 2 HOUR PRN
Status: DISCONTINUED | OUTPATIENT
Start: 2018-06-11 | End: 2018-06-11 | Stop reason: HOSPADM

## 2018-06-11 RX ORDER — METOCLOPRAMIDE HYDROCHLORIDE 5 MG/ML
10 INJECTION INTRAMUSCULAR; INTRAVENOUS AS NEEDED
Status: DISCONTINUED | OUTPATIENT
Start: 2018-06-11 | End: 2018-06-11 | Stop reason: HOSPADM

## 2018-06-11 RX ORDER — DIPHENHYDRAMINE HCL 25 MG
25 CAPSULE ORAL NIGHTLY
Status: DISCONTINUED | OUTPATIENT
Start: 2018-06-11 | End: 2018-06-22

## 2018-06-11 RX ORDER — MEPERIDINE HYDROCHLORIDE 25 MG/ML
12.5 INJECTION INTRAMUSCULAR; INTRAVENOUS; SUBCUTANEOUS AS NEEDED
Status: DISCONTINUED | OUTPATIENT
Start: 2018-06-11 | End: 2018-06-11 | Stop reason: HOSPADM

## 2018-06-11 RX ORDER — GABAPENTIN 100 MG/1
200 CAPSULE ORAL ONCE
Status: DISCONTINUED | OUTPATIENT
Start: 2018-06-11 | End: 2018-06-11 | Stop reason: HOSPADM

## 2018-06-11 RX ORDER — HYDROMORPHONE HYDROCHLORIDE 1 MG/ML
0.4 INJECTION, SOLUTION INTRAMUSCULAR; INTRAVENOUS; SUBCUTANEOUS EVERY 2 HOUR PRN
Status: DISCONTINUED | OUTPATIENT
Start: 2018-06-11 | End: 2018-06-22

## 2018-06-11 RX ORDER — HYDRALAZINE HYDROCHLORIDE 20 MG/ML
5 INJECTION INTRAMUSCULAR; INTRAVENOUS EVERY 4 HOURS PRN
Status: DISCONTINUED | OUTPATIENT
Start: 2018-06-11 | End: 2018-06-22

## 2018-06-11 RX ORDER — HYDROMORPHONE HYDROCHLORIDE 1 MG/ML
0.2 INJECTION, SOLUTION INTRAMUSCULAR; INTRAVENOUS; SUBCUTANEOUS EVERY 2 HOUR PRN
Status: DISCONTINUED | OUTPATIENT
Start: 2018-06-11 | End: 2018-06-22

## 2018-06-11 RX ORDER — METFORMIN HYDROCHLORIDE 750 MG/1
750 TABLET, EXTENDED RELEASE ORAL
Status: ON HOLD | COMMUNITY
End: 2018-06-22

## 2018-06-11 RX ORDER — METRONIDAZOLE 250 MG/1
250 TABLET ORAL 3 TIMES DAILY
Refills: 1 | Status: ON HOLD | COMMUNITY
Start: 2018-06-06 | End: 2018-06-22

## 2018-06-11 RX ORDER — HYDROMORPHONE HYDROCHLORIDE 1 MG/ML
0.4 INJECTION, SOLUTION INTRAMUSCULAR; INTRAVENOUS; SUBCUTANEOUS EVERY 5 MIN PRN
Status: DISCONTINUED | OUTPATIENT
Start: 2018-06-11 | End: 2018-06-11 | Stop reason: HOSPADM

## 2018-06-11 RX ORDER — SODIUM CHLORIDE, SODIUM LACTATE, POTASSIUM CHLORIDE, CALCIUM CHLORIDE 600; 310; 30; 20 MG/100ML; MG/100ML; MG/100ML; MG/100ML
INJECTION, SOLUTION INTRAVENOUS CONTINUOUS
Status: DISCONTINUED | OUTPATIENT
Start: 2018-06-11 | End: 2018-06-11 | Stop reason: HOSPADM

## 2018-06-11 RX ORDER — GLUCOSAMINE HCL 500 MG
3000 TABLET ORAL NIGHTLY
COMMUNITY

## 2018-06-11 RX ORDER — ACETAMINOPHEN 500 MG
1000 TABLET ORAL EVERY 8 HOURS
Status: DISCONTINUED | OUTPATIENT
Start: 2018-06-12 | End: 2018-06-22

## 2018-06-11 RX ORDER — SODIUM CHLORIDE 9 MG/ML
INJECTION, SOLUTION INTRAVENOUS CONTINUOUS
Status: DISCONTINUED | OUTPATIENT
Start: 2018-06-11 | End: 2018-06-11

## 2018-06-11 RX ORDER — CEFOXITIN 2 G/1
INJECTION, POWDER, FOR SOLUTION INTRAVENOUS
Status: DISCONTINUED | OUTPATIENT
Start: 2018-06-11 | End: 2018-06-11 | Stop reason: HOSPADM

## 2018-06-11 RX ORDER — FAMOTIDINE 20 MG/1
20 TABLET ORAL 2 TIMES DAILY
Status: DISCONTINUED | OUTPATIENT
Start: 2018-06-11 | End: 2018-06-22

## 2018-06-11 RX ORDER — MAGNESIUM HYDROXIDE 1200 MG/15ML
LIQUID ORAL CONTINUOUS PRN
Status: DISCONTINUED | OUTPATIENT
Start: 2018-06-11 | End: 2018-06-11

## 2018-06-11 RX ORDER — HYDROCODONE BITARTRATE AND ACETAMINOPHEN 5; 325 MG/1; MG/1
1 TABLET ORAL EVERY 6 HOURS PRN
Status: DISCONTINUED | OUTPATIENT
Start: 2018-06-11 | End: 2018-06-22

## 2018-06-11 RX ORDER — ACETAMINOPHEN 10 MG/ML
INJECTION, SOLUTION INTRAVENOUS
Status: COMPLETED
Start: 2018-06-11 | End: 2018-06-11

## 2018-06-11 RX ORDER — HYDROMORPHONE HYDROCHLORIDE 1 MG/ML
0.8 INJECTION, SOLUTION INTRAMUSCULAR; INTRAVENOUS; SUBCUTANEOUS EVERY 2 HOUR PRN
Status: DISCONTINUED | OUTPATIENT
Start: 2018-06-11 | End: 2018-06-22

## 2018-06-11 RX ORDER — ONDANSETRON 2 MG/ML
4 INJECTION INTRAMUSCULAR; INTRAVENOUS EVERY 4 HOURS PRN
Status: DISCONTINUED | OUTPATIENT
Start: 2018-06-11 | End: 2018-06-22

## 2018-06-11 RX ORDER — DIPHENHYDRAMINE HYDROCHLORIDE 50 MG/ML
12.5 INJECTION INTRAMUSCULAR; INTRAVENOUS AS NEEDED
Status: DISCONTINUED | OUTPATIENT
Start: 2018-06-11 | End: 2018-06-11 | Stop reason: HOSPADM

## 2018-06-11 RX ORDER — ONDANSETRON 2 MG/ML
4 INJECTION INTRAMUSCULAR; INTRAVENOUS AS NEEDED
Status: DISCONTINUED | OUTPATIENT
Start: 2018-06-11 | End: 2018-06-11 | Stop reason: HOSPADM

## 2018-06-11 RX ORDER — HYDROMORPHONE HYDROCHLORIDE 1 MG/ML
1 INJECTION, SOLUTION INTRAMUSCULAR; INTRAVENOUS; SUBCUTANEOUS EVERY 2 HOUR PRN
Status: DISCONTINUED | OUTPATIENT
Start: 2018-06-11 | End: 2018-06-11 | Stop reason: HOSPADM

## 2018-06-11 RX ORDER — MELOXICAM 15 MG/1
15 TABLET ORAL DAILY
COMMUNITY
End: 2018-08-26

## 2018-06-11 RX ORDER — ONDANSETRON 2 MG/ML
4 INJECTION INTRAMUSCULAR; INTRAVENOUS EVERY 4 HOURS PRN
Status: DISCONTINUED | OUTPATIENT
Start: 2018-06-11 | End: 2018-06-11

## 2018-06-11 RX ORDER — DEXTROSE AND SODIUM CHLORIDE 5; .45 G/100ML; G/100ML
INJECTION, SOLUTION INTRAVENOUS CONTINUOUS
Status: DISCONTINUED | OUTPATIENT
Start: 2018-06-11 | End: 2018-06-11

## 2018-06-11 RX ORDER — CARVEDILOL 12.5 MG/1
12.5 TABLET ORAL 2 TIMES DAILY WITH MEALS
COMMUNITY
End: 2018-07-11

## 2018-06-11 RX ORDER — HEPARIN SODIUM 5000 [USP'U]/ML
5000 INJECTION, SOLUTION INTRAVENOUS; SUBCUTANEOUS EVERY 8 HOURS SCHEDULED
Status: DISCONTINUED | OUTPATIENT
Start: 2018-06-12 | End: 2018-06-22

## 2018-06-11 RX ORDER — DEXTROSE MONOHYDRATE 25 G/50ML
50 INJECTION, SOLUTION INTRAVENOUS
Status: DISCONTINUED | OUTPATIENT
Start: 2018-06-11 | End: 2018-06-22

## 2018-06-11 RX ORDER — HYDROMORPHONE HYDROCHLORIDE 1 MG/ML
0.5 INJECTION, SOLUTION INTRAMUSCULAR; INTRAVENOUS; SUBCUTANEOUS EVERY 30 MIN PRN
Status: DISCONTINUED | OUTPATIENT
Start: 2018-06-11 | End: 2018-06-11

## 2018-06-11 NOTE — ED INITIAL ASSESSMENT (HPI)
Patient to ED with wife for increased purulent wound discharge this morning s/p abdominal abscess repair 6 days ago. Patient denies increased pain. Denies fevers.

## 2018-06-11 NOTE — H&P
[facility]  H&P    [id number] [facility]  Patient Status:  Emergency    1945 MRN [id number]   Location [address] [facility] Attending Tesfaye Wall MD   Hosp Day # 0 PCP Donavan Sigala MD       History of Present Illness:  Mckinley Zaldivar drink water this morning out nausea or vomiting. The patient states he had a bowel movement this morning which was normal for him.     The patient's past medical history consists of hypertension, nonischemic cardiomyopathy, history of kidney stones, and sl whether generalized or localized, unspecified site    • Other seborrheic keratosis 6/29/2012   • Perforated sigmoid colon ([facility] [address] 11/14/2013   • Perforated viscus 11/14/2013   • Plantar wart 6/29/2012   • Pleural effusion 11/25/2013    small, w/ infiltrates resection  11/14/2013: OTHER SURGICAL HISTORY      Comment: surgery for perforated viscus  7/24/2013: OTHER SURGICAL HISTORY      Comment: colon resection with LAR of rectosigmoid colon               and mobilization of splenic flexure  11/14/2013: OTHER S Comment:\"adhesive\"             Open sores  Augmentin [Amoxicil*    OTHER (SEE COMMENTS)    Comment:Unsure reaction  Neomycin                    Comment:If used topically- rash    Medications:  No current facility-administered medications for this encount rate and rhythm. No murmur. Abdomen: Soft, nondistended, several incisions throughout the abdomen including one large midline incision as well as to left sided transverse incisions.   Just off center to the midline incision in the left upper quadrant the laparotomy, removal of infected mesh, possible bowel resection, possible primary closure of abdominal wall, possible placement of biological mesh  2. I discussed the patient's clinical findings as well as the plan for operative intervention later this even

## 2018-06-11 NOTE — ED PROVIDER NOTES
Patient Seen in: [facility] Emergency Department    History   Patient presents with:  Postop/Procedure Problem    Stated Complaint: Post-op issue. Incision draining.      HPI    Patient has had multiple surgeries beginning with colon resection for [facility] Left bundle branch block 11/14/2013   • Left inguinal hernia 3/14/2013    small fat containing   • Morbid obesity ([facility] [address] 4/16/2013   • Osteoarthrosis, unspecified whether generalized or localized, unspecified site    • Other seborrheic keratosis 6/29/2012 BILATERAL A  PMM   No date: OTHER SURGICAL HISTORY      Comment: TRIGGER FINGER RIGHT   No date: OTHER SURGICAL HISTORY      Comment: bowel resection  11/14/2013: OTHER SURGICAL HISTORY      Comment: surgery for perforated viscus  7/24/2013: OTHER SURGICAL Incision draining. Other systems are as noted in HPI. Constitutional and vital signs reviewed. All other systems reviewed and negative except as noted above.     Physical Exam   ED Triage Vitals [06/11/18 0846]  BP: 107/66  Pulse: 76  Resp: 17  Temp 1501  ------------------------------------------------------------      [facility]     Admission disposition: 6/11/2018 10:23 AM       Patient will be admitted to Dr. Omar Reyes service for further. [facility] surgery PA at bedside.           Disposition and Plan     Clinic

## 2018-06-11 NOTE — ANESTHESIA PREPROCEDURE EVALUATION
PRE-OP EVALUATION    Patient Name: Cielo Schumacher.    Pre-op Diagnosis: Infected hernioplasty mesh ([facility] [address] Keith Pimentel    Procedure(s):  EXPLORATORY LAPAROTOMY, LYSIS OF ADHESIONS, REMOVAL OF INFECTED MESH, POSSIBLE BOWEL RESECTION    Surgeon(s) and Role (TYLENOL ARTHRITIS PAIN) 650 MG Oral Tab CR Take 650 mg by mouth every 8 (eight) hours as needed for Pain. Disp:  Rfl:        Allergies: Neosporin Original [Neomycin-Bacitracin-Polymyxin]; Adhesive Tape; Augmentin [Amoxicillin-Pot Clavulanate];  Neomycin HISTORY      Comment: central venous line placement, LAR, creation                of colostomy, Persaud's pouch, drainage of                abdominal abscess  3/5/2014: OTHER SURGICAL HISTORY      Comment: central venous line placement, take down of cavity size is normal. Wall thickness is normal.     Systolic function is normal. The estimated ejection fraction is 55%. Wall     motion is normal; there are no regional wall motion abnormalities.   2. Ventricular septum: Septal motion shows moderate dyssy

## 2018-06-11 NOTE — ANESTHESIA POSTPROCEDURE EVALUATION
[id number]  Patient Status:  Inpatient   Age/Gender 67year old male MRN [id number]   Location [facility] Attending Adriano Osborne MD   Hosp Day # 0 PCP Olivia Gaviria MD       Anesthesia Post-op Note

## 2018-06-11 NOTE — PROGRESS NOTES
Received pt back from PACU, a/ox4, c/o pain to abdomin, iv toradol and dilaudid given. Incision intact with staples, scant amount of drainage. Gauze and abd layed on incision   NO adhesive applied. Abdominal binder at bedside if needed.

## 2018-06-11 NOTE — PROGRESS NOTES
NURSING ADMISSION NOTE      Patient admitted via Cart  Oriented to room. Safety precautions initiated. Bed in low position. Call light in reach. A/ox4, denies pain. Has gauze pack and abd on his abdomin to soak up drainage. NPO since last night.  Up

## 2018-06-11 NOTE — OPERATIVE REPORT
[facility]  Op Note    Jai Patiño.  Location: OR   [facility] [id number] MRN [id number]   Admission Date 6/11/2018 Operation Date 6/11/2018   Attending Physician Sallie Sol MD Operating Physician Yolande Pacheco MD     Pre-Operative Diagnosis: Infect accomplished a long midline incision, we did a comprehensive lysis of adhesions. We ran the entire small intestine from the ligament of Treitz to the ileocecal valve. This gave us access to the portion of the bowel that was injured.   It is the mid jejunu running #1 Ethibond suture in a simple continuous fashion. Other small defects that were above the mesh but full-thickness through the anterior abdominal wall were closed with interrupted #1 Ethibond figure-of-eight sutures.     The large [place] drain was p

## 2018-06-11 NOTE — ED NOTES
Pt in waiting room bathroom, says he couldn't hold it. This PCT told pt to provide clean catch urine sample in case ED physician ordered it. Pt will bring urine sample to room.

## 2018-06-12 LAB
GLUCOSE BLD-MCNC: 123 MG/DL (ref 65–99)
GLUCOSE BLD-MCNC: 125 MG/DL (ref 65–99)
GLUCOSE BLD-MCNC: 133 MG/DL (ref 65–99)
GLUCOSE BLD-MCNC: 133 MG/DL (ref 65–99)
GLUCOSE BLD-MCNC: 134 MG/DL (ref 65–99)

## 2018-06-12 NOTE — RESPIRATORY THERAPY NOTE
GIBRAN - Equipment Use Daily Summary:                  . Set Mode:                . Usage in hours:                . 90% Pressure (EPAP) level:                . 90% Insp. Pressure (IPAP): José Antonio AHI:                .  Supplemental Oxygen:    LPM

## 2018-06-12 NOTE — PROGRESS NOTES
[facility]  Progress Note    Mili Gaitan.  Patient Status:  Inpatient    1945 MRN [id number]   [facility] 3NW-A Attending Nolan Cabrera MD   Hosp Day # 1 PCP Herminia Bermeo MD     Subjective:  No new complaints, incisional p counseling the patient on the above listed diagnoses and treatment options.     Ora Cowden  6/12/2018  8:16 AM

## 2018-06-12 NOTE — PLAN OF CARE
Pt drowsy, alert x3, VSS. On 2L NC, no sob noted. Active bowel sounds, denies flatus. Staples intact, scant amount of drainage noted. Gauze and abdominal dressing intact. Dilaudid administered for pain.  GUY noted to right side, small amount of bloody output

## 2018-06-12 NOTE — CM/SW NOTE
Met with the pt and his wife at the bedside to assess for dc needs. Due to his history of multiple surgeries, the pt is very comfortable with the idea going home and managing on his own after discharge.  / to remain available for Nate

## 2018-06-13 LAB
GLUCOSE BLD-MCNC: 107 MG/DL (ref 65–99)
GLUCOSE BLD-MCNC: 116 MG/DL (ref 65–99)
GLUCOSE BLD-MCNC: 131 MG/DL (ref 65–99)
GLUCOSE BLD-MCNC: 223 MG/DL (ref 65–99)

## 2018-06-13 NOTE — PROGRESS NOTES
[facility]  Progress Note    Adonica Brothers.  Patient Status:  Inpatient    1945 MRN [id number]   [facility] 3NW-A Attending Maria Luisa Monaco MD   Hosp Day # 2 PCP Joie Wilson MD     Subjective:  Pt in poor spirits this AM, inc Plan: 1. IV's, Ambulate, DVT prophylaxis  2. Clear liquids - will not advance today until pt feeling improving or demonstrating bowel function   3. ABD or ACE wrap to abdomen, no adhesives     My total face time with this patient was 20 minutes.   Selma Flores

## 2018-06-13 NOTE — PLAN OF CARE
GENITOURINARY - ADULT    • Absence of urinary retention Completed          GASTROINTESTINAL - ADULT    • Minimal or absence of nausea and vomiting Progressing    • Maintains or returns to baseline bowel function Progressing        PAIN - ADULT    • Aleena Carpenter

## 2018-06-13 NOTE — PROGRESS NOTES
6/12/18 ORDER RECEIVED TO DISCONTINUE SAEZ CATHETER AT THIS TIME. SAEZ CATHETER REMOVED BY TARIQ BEEBE WITHOUT ANY PROBLEMS REPORTED TO WRITER. PT DTV. PT AWARE AND AGREES. ALL QUESTIONS ANSWERED. CONT TO MONITOR.

## 2018-06-14 LAB
GLUCOSE BLD-MCNC: 111 MG/DL (ref 65–99)
GLUCOSE BLD-MCNC: 112 MG/DL (ref 65–99)
GLUCOSE BLD-MCNC: 117 MG/DL (ref 65–99)
GLUCOSE BLD-MCNC: 120 MG/DL (ref 65–99)

## 2018-06-14 RX ORDER — CARVEDILOL 12.5 MG/1
12.5 TABLET ORAL 2 TIMES DAILY WITH MEALS
Status: DISCONTINUED | OUTPATIENT
Start: 2018-06-14 | End: 2018-06-22

## 2018-06-14 NOTE — PROGRESS NOTES
[facility]  Progress Note    Iman [place].  Patient Status:  Inpatient    1945 MRN [id number]   [facility] 3NW-A Attending Conrad Austin MD   Hosp Day # 3 PCP Donavan Sigala MD     Subjective:  No new complaints, incisional p resolving    Plan:  1. Ambulate, DVT prophylaxis, GI prophylaxis  2. Stay on sips of clears only    My total face time with this patient was 30 minutes.   Greater than half of our visit was spent in counseling the patient on the above listed diagnoses and t

## 2018-06-15 DIAGNOSIS — E11.9 TYPE II DIABETES MELLITUS, WELL CONTROLLED (HCC): ICD-10-CM

## 2018-06-15 LAB
GLUCOSE BLD-MCNC: 101 MG/DL (ref 65–99)
GLUCOSE BLD-MCNC: 109 MG/DL (ref 65–99)
GLUCOSE BLD-MCNC: 112 MG/DL (ref 65–99)
GLUCOSE BLD-MCNC: 125 MG/DL (ref 65–99)

## 2018-06-15 RX ORDER — METFORMIN HYDROCHLORIDE 750 MG/1
750 TABLET, EXTENDED RELEASE ORAL
Qty: 30 TABLET | Refills: 0 | Status: SHIPPED | OUTPATIENT
Start: 2018-06-15 | End: 2018-07-22

## 2018-06-15 NOTE — DIETARY NOTE
[facility]    NUTRITION INITIAL ASSESSMENT     Pt does not meet malnutrition criteria.     NUTRITION DIAGNOSIS/PROBLEM:    Inadequate oral intake related to inability to consume sufficient po as evidenced by pt NPO x 4 days, POD #4, ileus    NUTRITION  grams protein/day (1.2-1.5 grams protein per kg)  Fluid: ~1 ml/kcal or per MD discretion    MONITOR AND EVALUATE/NUTRITION GOALS:  1. Pt to meet >75% of nutritional needs via most appropriate route when medically feasible  3.  No signs of skin breakd

## 2018-06-15 NOTE — PLAN OF CARE
Problem: Patient/Family Goals  Goal: Patient/Family Long Term Goal  Patient's Long Term Goal: to go home  Interventions:  - pain controlled with po pain meds prn  -void freely  -ambulate halls tid    - See additional Care Plan goals for specific interventi Assess bowel function  - Maintain adequate hydration with IV or PO as ordered and tolerated  - Evaluate effectiveness of GI medications  - Encourage mobilization and activity  - Obtain nutritional consult as needed  - Establish a toileting routine/schedule

## 2018-06-15 NOTE — PROGRESS NOTES
[facility]  Progress Note    Linda Kimball.  Patient Status:  Inpatient    1945 MRN [id number]   [facility] 3NW-A Attending Maria Del Rosario Davila MD   Hosp Day # 4 PCP Hadley Green MD     Subjective:  No new complaints, + incisional initial encounter     Abscess    POD 4 small bowel resection with anastomosis and ventral incisional hernia repair with biologic mesh  Expected Ileus resolving    Plan:  1.  Continue sips of clear liquids, may have clear liquid diet later today if he has in

## 2018-06-16 LAB
GLUCOSE BLD-MCNC: 100 MG/DL (ref 65–99)
GLUCOSE BLD-MCNC: 105 MG/DL (ref 65–99)
GLUCOSE BLD-MCNC: 112 MG/DL (ref 65–99)
GLUCOSE BLD-MCNC: 123 MG/DL (ref 65–99)

## 2018-06-16 RX ORDER — SODIUM PHOSPHATE, DIBASIC AND SODIUM PHOSPHATE, MONOBASIC 7; 19 G/133ML; G/133ML
1 ENEMA RECTAL ONCE AS NEEDED
Status: COMPLETED | OUTPATIENT
Start: 2018-06-16 | End: 2018-06-16

## 2018-06-16 NOTE — PLAN OF CARE
ASSUMED PT CARE. PT IS  UP IN CHAIR. DENIES ANY PAIN. ABD SOFT AND DISTENDED. PT REPORTS FLATUS. PT HAS DECLINED ENEMMA, STATES SHE WOULD LIKE  WAIT AND SEE IF MOM WILL HELP. ABD INCISION WITH STAPLES CDI.

## 2018-06-16 NOTE — PLAN OF CARE
Pt a&o, VSS. On RA, no sob noted. Hypoactive bowel sounds, reports flatus, denies N/V. Midline and left transverse incision c/d/i, gauze and abd dressing c/d/i. Pt states pain is tolerable. GUY x1 noted to RLQ, small amount of serosanguineous output.  IVF in

## 2018-06-16 NOTE — PLAN OF CARE
Problem: Diabetes/Glucose Control  Goal: Glucose maintained within prescribed range  INTERVENTIONS:  - Monitor Blood Glucose as ordered  - Assess for signs and symptoms of hyperglycemia and hypoglycemia  - Administer ordered medications to maintain glucose Establish a toileting routine/schedule  - Consider collaborating with pharmacy to review patient's medication profile   Outcome: Progressing  Pt reports bloating. Abdomen distended.   Denies passing gas this am.  Milk of mag given as ordered by dr Fiona Avila

## 2018-06-16 NOTE — PROGRESS NOTES
[facility]  Progress Note    Jeevan King.  Patient Status:  Inpatient    1945 MRN [id number]   [facility] 3NW-A Attending Lenny Fitzgerald MD   Hosp Day # 5 PCP Jaydon Koenig MD     Subjective:  No new complaints, incisional p minutes. Greater than half of our visit was spent in counseling the patient on the above listed diagnoses and treatment options.     Juan Daniel Martins  6/16/2018  8:39 AM

## 2018-06-17 LAB
BUN BLD-MCNC: 13 MG/DL (ref 8–20)
CALCIUM BLD-MCNC: 8.5 MG/DL (ref 8.3–10.3)
CHLORIDE: 103 MMOL/L (ref 101–111)
CO2: 26 MMOL/L (ref 22–32)
CREAT BLD-MCNC: 0.8 MG/DL (ref 0.7–1.3)
GLUCOSE BLD-MCNC: 107 MG/DL (ref 65–99)
GLUCOSE BLD-MCNC: 115 MG/DL (ref 70–99)
GLUCOSE BLD-MCNC: 122 MG/DL (ref 65–99)
GLUCOSE BLD-MCNC: 157 MG/DL (ref 65–99)
GLUCOSE BLD-MCNC: 98 MG/DL (ref 65–99)
HAV IGM SER QL: 2.2 MG/DL (ref 1.8–2.5)
PHOSPHATE SERPL-MCNC: 3 MG/DL (ref 2.5–4.9)
POTASSIUM SERPL-SCNC: 4.3 MMOL/L (ref 3.6–5.1)
SODIUM SERPL-SCNC: 138 MMOL/L (ref 136–144)

## 2018-06-17 NOTE — PLAN OF CARE
A&O, VSS. On RA, no sob noted. Hypoactive bowel sounds, abdominal incisions c/d/i, abd dressing with ace wrap c/d/i. MOM administered, pt had several loose green/brown stools, occasional incontinence, brief on. GUY to RLQ with serosanguineous output.  Denies

## 2018-06-17 NOTE — PROGRESS NOTES
[facility]  Progress Note    Cooper White.  Patient Status:  Inpatient    1945 MRN [id number]   [facility] 3NW-A Attending Kosta Gabriel MD   Hosp Day # 6 PCP Giovany Salas MD     Subjective:  Patient tolerating sips of gabriela infection, initial encounter     Abscess      Postop day 6 status post ventral hernia repair with removal of infected mesh, partial small bowel resection, and placement of biologic mesh  -Postop ileus persists  -Electrolytes all within normal limits  -Alejandra

## 2018-06-18 ENCOUNTER — APPOINTMENT (OUTPATIENT)
Dept: GENERAL RADIOLOGY | Facility: HOSPITAL | Age: 73
DRG: 907 | End: 2018-06-18
Attending: COLON & RECTAL SURGERY
Payer: MEDICARE

## 2018-06-18 LAB
ALBUMIN SERPL-MCNC: 2.1 G/DL (ref 3.5–4.8)
ALP LIVER SERPL-CCNC: 72 U/L (ref 45–117)
ALT SERPL-CCNC: 25 U/L (ref 17–63)
AST SERPL-CCNC: 26 U/L (ref 15–41)
BILIRUB SERPL-MCNC: 0.6 MG/DL (ref 0.1–2)
BUN BLD-MCNC: 7 MG/DL (ref 8–20)
CALCIUM BLD-MCNC: 7.1 MG/DL (ref 8.3–10.3)
CHLORIDE: 112 MMOL/L (ref 101–111)
CO2: 20 MMOL/L (ref 22–32)
CREAT BLD-MCNC: 0.42 MG/DL (ref 0.7–1.3)
GLUCOSE BLD-MCNC: 105 MG/DL (ref 65–99)
GLUCOSE BLD-MCNC: 136 MG/DL (ref 65–99)
GLUCOSE BLD-MCNC: 88 MG/DL (ref 70–99)
GLUCOSE BLD-MCNC: 89 MG/DL (ref 65–99)
GLUCOSE BLD-MCNC: 93 MG/DL (ref 65–99)
GLUCOSE BLD-MCNC: 98 MG/DL (ref 65–99)
HAV IGM SER QL: 1.8 MG/DL (ref 1.8–2.5)
M PROTEIN MFR SERPL ELPH: 4.7 G/DL (ref 6.1–8.3)
PHOSPHATE SERPL-MCNC: 2 MG/DL (ref 2.5–4.9)
POTASSIUM SERPL-SCNC: 3.2 MMOL/L (ref 3.6–5.1)
SODIUM SERPL-SCNC: 142 MMOL/L (ref 136–144)

## 2018-06-18 PROCEDURE — B54MZZA ULTRASONOGRAPHY OF RIGHT UPPER EXTREMITY VEINS, GUIDANCE: ICD-10-PCS | Performed by: COLON & RECTAL SURGERY

## 2018-06-18 PROCEDURE — 74019 RADEX ABDOMEN 2 VIEWS: CPT | Performed by: COLON & RECTAL SURGERY

## 2018-06-18 PROCEDURE — 3E0436Z INTRODUCTION OF NUTRITIONAL SUBSTANCE INTO CENTRAL VEIN, PERCUTANEOUS APPROACH: ICD-10-PCS | Performed by: COLON & RECTAL SURGERY

## 2018-06-18 PROCEDURE — 02HV33Z INSERTION OF INFUSION DEVICE INTO SUPERIOR VENA CAVA, PERCUTANEOUS APPROACH: ICD-10-PCS | Performed by: COLON & RECTAL SURGERY

## 2018-06-18 RX ORDER — SODIUM CHLORIDE 0.9 % (FLUSH) 0.9 %
10 SYRINGE (ML) INJECTION AS NEEDED
Status: DISCONTINUED | OUTPATIENT
Start: 2018-06-18 | End: 2018-06-22

## 2018-06-18 NOTE — DIETARY NOTE
Parenteral nutrition short note  TPN starting today at 2200 ml fluid, 60 grams protein (240 kcal), 500 kcal dextrose, 300 kcal lipids. This will meet ~47% of est needs. Pt with new PICC line. Discontinue LR infusion once TPN is hung.    TPN Goal: 115 grams

## 2018-06-18 NOTE — PLAN OF CARE
Diabetes/Glucose Control    • Glucose maintained within prescribed range Progressing        PAIN - ADULT    • Verbalizes/displays adequate comfort level or patient's stated pain goal Progressing        Patient/Family Goals    • Patient/Family [facility]

## 2018-06-18 NOTE — PROGRESS NOTES
[facility]  Progress Note    Audra Jha. Patient Status:  Inpatient    1945 MRN [id number]   [facility] 3NW-A Attending Mari Miller MD   Hosp Day # 7 PCP Dayana Spears MD     Subjective:  No new complaints. Still distend Recurrent kidney stones     Benign non-nodular prostatic hyperplasia with lower urinary tract symptoms     Rotator cuff tendinitis, right     Osteoarthritis of left glenohumeral joint     Chronic abdominal wound infection, initial encounter     Abscess

## 2018-06-19 LAB
ALBUMIN SERPL-MCNC: 2.7 G/DL (ref 3.5–4.8)
ALP LIVER SERPL-CCNC: 87 U/L (ref 45–117)
ALT SERPL-CCNC: 30 U/L (ref 17–63)
AST SERPL-CCNC: 30 U/L (ref 15–41)
BILIRUB SERPL-MCNC: 0.6 MG/DL (ref 0.1–2)
BUN BLD-MCNC: 9 MG/DL (ref 8–20)
CALCIUM BLD-MCNC: 8.5 MG/DL (ref 8.3–10.3)
CHLORIDE: 106 MMOL/L (ref 101–111)
CO2: 26 MMOL/L (ref 22–32)
CREAT BLD-MCNC: 0.68 MG/DL (ref 0.7–1.3)
GLUCOSE BLD-MCNC: 120 MG/DL (ref 65–99)
GLUCOSE BLD-MCNC: 123 MG/DL (ref 70–99)
GLUCOSE BLD-MCNC: 134 MG/DL (ref 65–99)
HAV IGM SER QL: 2.5 MG/DL (ref 1.8–2.5)
M PROTEIN MFR SERPL ELPH: 6 G/DL (ref 6.1–8.3)
PHOSPHATE SERPL-MCNC: 3.5 MG/DL (ref 2.5–4.9)
POTASSIUM SERPL-SCNC: 3.9 MMOL/L (ref 3.6–5.1)
SODIUM SERPL-SCNC: 138 MMOL/L (ref 136–144)
TRIGL SERPL-MCNC: 166 MG/DL (ref ?–150)

## 2018-06-19 NOTE — CM/SW NOTE
Complex Care Assessment    Cielo Schumacher.  Patient Status:  Inpatient   Age/Gender 67year old male MRN [id number]   [facility] 3NW-A Attending Nba Olvera MD   [facility] Day # 8 PCP Nicole Moya MD     Assessment Type: Initial    Crite

## 2018-06-19 NOTE — PROGRESS NOTES
[facility]  Progress Note    Shantel Mayers.  Patient Status:  Inpatient    1945 MRN [id number]   [facility] 3NW-A Attending Beck Ahuja MD   Hosp Day # 8 PCP Baylee Cameron MD     Subjective:  Pt resting comfortably in bed, branch block     HTN (hypertension)     Diverticulosis     Colon polyp     Hepatic cyst     Bilateral renal cysts     Fatty infiltration of liver     GIBRAN on CPAP     Recurrent kidney stones     Benign non-nodular prostatic hyperplasia with lower urinary tr does not return given the documented tacking of the omentum to the abdominal wall        Rikki Richard MD   [facility]  6/19/2018  11:19 PM

## 2018-06-19 NOTE — PLAN OF CARE
VSS. A&O. Midline incision and left transverse incision cdi with staples and sutures. GUY site intact, draining serosang drainage. PICC line intact, TPN started this shift and infusing well. Right arm precautions in place.  Stable blood sugar, no coverage pr

## 2018-06-19 NOTE — PLAN OF CARE
Problem: Diabetes/Glucose Control  Goal: Glucose maintained within prescribed range  INTERVENTIONS:  - Monitor Blood Glucose as ordered  - Assess for signs and symptoms of hyperglycemia and hypoglycemia  - Administer ordered medications to maintain glucose hydration with IV or PO as ordered and tolerated  - Nasogastric tube to low intermittent suction as ordered  - Evaluate effectiveness of ordered antiemetic medications  - Provide nonpharmacologic comfort measures as appropriate  - Advance diet as tolerated ALL QUESTIONS . VERBALIZED UNDERSTANDING .  WILL CONTINUE TO MONITOR

## 2018-06-19 NOTE — DIETARY NOTE
Parenteral nutrition short note  TPN at 2200 ml fluid, 90 grams protein, 810 kcal dextrose, 500 kcal lipids. This will meet ~76% of est needs. Electrolytes adjusted per AM labs.   TPN Goal: 115 grams protein (460 kcal), 1740 NPC ( 1080kcal dextrose, 660 kca

## 2018-06-20 ENCOUNTER — APPOINTMENT (OUTPATIENT)
Dept: GENERAL RADIOLOGY | Facility: HOSPITAL | Age: 73
DRG: 907 | End: 2018-06-20
Attending: COLON & RECTAL SURGERY
Payer: MEDICARE

## 2018-06-20 LAB
ALBUMIN SERPL-MCNC: 2.9 G/DL (ref 3.5–4.8)
ALP LIVER SERPL-CCNC: 83 U/L (ref 45–117)
ALT SERPL-CCNC: 27 U/L (ref 17–63)
AST SERPL-CCNC: 20 U/L (ref 15–41)
BILIRUB SERPL-MCNC: 0.7 MG/DL (ref 0.1–2)
BUN BLD-MCNC: 10 MG/DL (ref 8–20)
CALCIUM BLD-MCNC: 8.4 MG/DL (ref 8.3–10.3)
CHLORIDE: 107 MMOL/L (ref 101–111)
CO2: 25 MMOL/L (ref 22–32)
CREAT BLD-MCNC: 0.66 MG/DL (ref 0.7–1.3)
GLUCOSE BLD-MCNC: 115 MG/DL (ref 70–99)
GLUCOSE BLD-MCNC: 116 MG/DL (ref 65–99)
GLUCOSE BLD-MCNC: 129 MG/DL (ref 65–99)
GLUCOSE BLD-MCNC: 131 MG/DL (ref 65–99)
GLUCOSE BLD-MCNC: 133 MG/DL (ref 65–99)
GLUCOSE BLD-MCNC: 136 MG/DL (ref 65–99)
HAV IGM SER QL: 2.4 MG/DL (ref 1.8–2.5)
M PROTEIN MFR SERPL ELPH: 6.5 G/DL (ref 6.1–8.3)
PHOSPHATE SERPL-MCNC: 4.4 MG/DL (ref 2.5–4.9)
POTASSIUM SERPL-SCNC: 4.4 MMOL/L (ref 3.6–5.1)
SODIUM SERPL-SCNC: 139 MMOL/L (ref 136–144)

## 2018-06-20 PROCEDURE — 74250 X-RAY XM SM INT 1CNTRST STD: CPT | Performed by: COLON & RECTAL SURGERY

## 2018-06-20 NOTE — PROGRESS NOTES
[facility]  Progress Note    Sandra Salcedo.  Patient Status:  Inpatient    1945 MRN [id number]   [facility] 3NW-A Attending Angela Lane MD   [address] Day # 9 PCP Anum Hanley MD     Subjective:  Pt resting comfortably in bed, localized osteoarthrosis, lower leg     Arthropathy     Cardiomyopathy ([address]     Left bundle branch block     HTN (hypertension)     Diverticulosis     Colon polyp     Hepatic cyst     Bilateral renal cysts     Fatty infiltration of liver     GIBRAN on CPAP follow-through that was performed today showed contrast arriving at the cecum in a 120 minutes.   Per radiology report is no obvious transition point within the small intestine, however that there were focal dilated loops of small bowel in the right lower q

## 2018-06-20 NOTE — DIETARY NOTE
[facility] UP ASSESSMENT     Pt does not meet malnutrition criteria.     NUTRITION DIAGNOSIS/PROBLEM:    Inadequate oral intake related to inability to consume sufficient po as evidenced by pt need for TPN- edited and continues    N 0%  Intake Meeting Needs: No  Food Allergies: No  Cultural/Ethnic/Adventism Preferences Addresses: Yes    NUTRITION RELATED PHYSICAL FINDINGS:     1. Body Fat/Muscle Mass: BMI: 33.18     2.  Fluid Accumulation: none noted    NUTRITION PRESCRIPTION: based on

## 2018-06-20 NOTE — PLAN OF CARE
VSS. A&O. Midline and left transverse incision cdi with staples and sutures. GUY site intact, draining serosang drainage. PICC line intact, good blood return noted, TPN infusing well. Right arm preautions maintained. Stable on room air, using IS.  SCD in [place]

## 2018-06-20 NOTE — PLAN OF CARE
Problem: Diabetes/Glucose Control  Goal: Glucose maintained within prescribed range  INTERVENTIONS:  - Monitor Blood Glucose as ordered  - Assess for signs and symptoms of hyperglycemia and hypoglycemia  - Administer ordered medications to maintain glucose and tolerated  - Nasogastric tube to low intermittent suction as ordered  - Evaluate effectiveness of ordered antiemetic medications  - Provide nonpharmacologic comfort measures as appropriate  - Advance diet as tolerated, if ordered  - Obtain nutritional

## 2018-06-21 LAB
ALBUMIN SERPL-MCNC: 2.9 G/DL (ref 3.5–4.8)
ALP LIVER SERPL-CCNC: 83 U/L (ref 45–117)
ALT SERPL-CCNC: 26 U/L (ref 17–63)
AST SERPL-CCNC: 18 U/L (ref 15–41)
BILIRUB SERPL-MCNC: 0.6 MG/DL (ref 0.1–2)
BUN BLD-MCNC: 12 MG/DL (ref 8–20)
CALCIUM BLD-MCNC: 8.5 MG/DL (ref 8.3–10.3)
CHLORIDE: 107 MMOL/L (ref 101–111)
CO2: 25 MMOL/L (ref 22–32)
CREAT BLD-MCNC: 0.71 MG/DL (ref 0.7–1.3)
GLUCOSE BLD-MCNC: 113 MG/DL (ref 70–99)
GLUCOSE BLD-MCNC: 122 MG/DL (ref 65–99)
GLUCOSE BLD-MCNC: 126 MG/DL (ref 65–99)
GLUCOSE BLD-MCNC: 129 MG/DL (ref 65–99)
GLUCOSE BLD-MCNC: 151 MG/DL (ref 65–99)
GLUCOSE BLD-MCNC: 174 MG/DL (ref 65–99)
HAV IGM SER QL: 1.9 MG/DL (ref 1.8–2.5)
M PROTEIN MFR SERPL ELPH: 6.5 G/DL (ref 6.1–8.3)
PHOSPHATE SERPL-MCNC: 4.4 MG/DL (ref 2.5–4.9)
POTASSIUM SERPL-SCNC: 4.5 MMOL/L (ref 3.6–5.1)
SODIUM SERPL-SCNC: 141 MMOL/L (ref 136–144)

## 2018-06-21 NOTE — PLAN OF CARE
Diabetes/Glucose Control    • Glucose maintained within prescribed range Progressing        GASTROINTESTINAL - ADULT    • Maintains or returns to baseline bowel function Progressing        PAIN - ADULT    • Verbalizes/displays adequate comfort level or pat

## 2018-06-21 NOTE — PROGRESS NOTES
[facility]  Progress Note    Deepak Ang.  Patient Status:  Inpatient    1945 MRN [id number]   [facility] 3NW-A Attending Ted Carroll MD   Hosp Day # 10 PCP Chitra Soriano MD     Subjective:  Pt resting comfortably in bed, 6/18/2018, 7:38. INDICATIONS:  post op ileus vs bowel obstruction. Abdominal pain after surgery. Bloated. PATIENT STATED HISTORY: (As transcribed by Technologist)  Post op ileus vs bowel obstruction.       FLUOROSCOPY IMAGES OBTAINED:  8  FLUOROSC Jozef Cody MD              Assessment  Patient Active Problem List:     Primary localized osteoarthrosis, lower leg     Arthropathy     Cardiomyopathy ([facility] [address]     Left bundle branch block     HTN (hypertension)     Diverticulosis     Colon polyp

## 2018-06-21 NOTE — DIETARY NOTE
Parenteral nutrition short note  TPN reordered at reduced rate of 1000 ml fluid, 60 grams protein (240 kcal), 500 kcal dextrose, 300 kcal lipids. This will meet ~47% of est needs. Plan to wean as pt starts on fulls. RD to follow.   Inge Garcia RD, LDN  Pag

## 2018-06-22 VITALS
OXYGEN SATURATION: 100 % | SYSTOLIC BLOOD PRESSURE: 120 MMHG | BODY MASS INDEX: 32.85 KG/M2 | HEIGHT: 69 IN | HEART RATE: 82 BPM | WEIGHT: 221.81 LBS | DIASTOLIC BLOOD PRESSURE: 70 MMHG | RESPIRATION RATE: 16 BRPM | TEMPERATURE: 97 F

## 2018-06-22 LAB
ALBUMIN SERPL-MCNC: 2.9 G/DL (ref 3.5–4.8)
ALP LIVER SERPL-CCNC: 95 U/L (ref 45–117)
ALT SERPL-CCNC: 30 U/L (ref 17–63)
AST SERPL-CCNC: 23 U/L (ref 15–41)
BILIRUB SERPL-MCNC: 0.7 MG/DL (ref 0.1–2)
BUN BLD-MCNC: 17 MG/DL (ref 8–20)
CALCIUM BLD-MCNC: 8.5 MG/DL (ref 8.3–10.3)
CHLORIDE: 106 MMOL/L (ref 101–111)
CO2: 25 MMOL/L (ref 22–32)
CREAT BLD-MCNC: 0.71 MG/DL (ref 0.7–1.3)
GLUCOSE BLD-MCNC: 102 MG/DL (ref 65–99)
GLUCOSE BLD-MCNC: 113 MG/DL (ref 70–99)
GLUCOSE BLD-MCNC: 119 MG/DL (ref 65–99)
GLUCOSE BLD-MCNC: 99 MG/DL (ref 65–99)
HAV IGM SER QL: 2 MG/DL (ref 1.8–2.5)
M PROTEIN MFR SERPL ELPH: 6.6 G/DL (ref 6.1–8.3)
PHOSPHATE SERPL-MCNC: 3.8 MG/DL (ref 2.5–4.9)
POTASSIUM SERPL-SCNC: 4.3 MMOL/L (ref 3.6–5.1)
SODIUM SERPL-SCNC: 136 MMOL/L (ref 136–144)

## 2018-06-22 RX ORDER — CEFADROXIL 500 MG/1
500 CAPSULE ORAL 2 TIMES DAILY
Qty: 20 CAPSULE | Refills: 0 | Status: SHIPPED | OUTPATIENT
Start: 2018-06-22 | End: 2018-07-02

## 2018-06-22 RX ORDER — DOCUSATE SODIUM 100 MG/1
100 CAPSULE, LIQUID FILLED ORAL DAILY
Qty: 20 CAPSULE | Refills: 0 | Status: SHIPPED | OUTPATIENT
Start: 2018-06-22 | End: 2018-07-16 | Stop reason: ALTCHOICE

## 2018-06-22 NOTE — PROGRESS NOTES
[facility]  Progress Note    Angelita Argueta. Patient Status:  Inpatient    1945 MRN [id number]   [facility] 3NW-A Attending Rey Gomes MD   [address] Day # 6 PCP Dorothy Olson MD     Subjective:  Patient sitting in chair.  Feel cuff tendinitis, right     Osteoarthritis of left glenohumeral joint     Chronic abdominal wound infection, initial encounter     Abscess    S/p laparotomy with removal of infected mesh and SB resection with anastomosis   Ileus - resolved clinically and on

## 2018-06-22 NOTE — PLAN OF CARE
Assumed care at 0700. Patient is alert and oriented. Denies any complaint of pain. Midabdominal incision, open to air  Clean dry and intact. Right GUY draining small amount of serosanguinous drainage. Vitals stable. Up independently, ambulates in halls.  [id number] Tasha Segura

## 2018-06-25 ENCOUNTER — PATIENT OUTREACH (OUTPATIENT)
Dept: CASE MANAGEMENT | Age: 73
End: 2018-06-25

## 2018-06-25 ENCOUNTER — PATIENT MESSAGE (OUTPATIENT)
Dept: CASE MANAGEMENT | Age: 73
End: 2018-06-25

## 2018-06-25 DIAGNOSIS — L08.9 CHRONIC ABDOMINAL WOUND INFECTION, INITIAL ENCOUNTER: ICD-10-CM

## 2018-06-25 DIAGNOSIS — S31.109A CHRONIC ABDOMINAL WOUND INFECTION, INITIAL ENCOUNTER: ICD-10-CM

## 2018-06-25 DIAGNOSIS — L02.91 ABSCESS: ICD-10-CM

## 2018-06-26 NOTE — PROGRESS NOTES
Initial Post Discharge Follow Up   Discharge Date: 6/22/18  Contact Date: 6/25/2018    Consent Verification:  Assessment Completed With: Patient  Spouse: Lucianajanis Savannah received per patient?  written  HIPAA Verified?   Yes    Discharge Dx:   Abscess no      Medications:     Current Outpatient Prescriptions:  Cefadroxil 500 MG Oral Cap Take 1 capsule (500 mg total) by mouth 2 (two) times daily. Disp: 20 capsule Rfl: 0   docusate sodium 100 MG Oral Cap Take 1 capsule (100 mg total) by mouth daily.  Disp: Department [facility])    Jun 27, 2018  9:45 AM CDT Postop with Hesham Dimas ([facility])    Jun 28, 2018  1:20 PM CDT Hospital Follow Up with Kenzie Hsu MD [facility] [address] [place]

## 2018-06-27 ENCOUNTER — DOCUMENTATION ONLY (OUTPATIENT)
Dept: FAMILY MEDICINE CLINIC | Facility: CLINIC | Age: 73
End: 2018-06-27

## 2018-06-27 ENCOUNTER — OFFICE VISIT (OUTPATIENT)
Dept: SURGERY | Facility: CLINIC | Age: 73
End: 2018-06-27

## 2018-06-27 VITALS
HEART RATE: 78 BPM | TEMPERATURE: 98 F | HEIGHT: 69 IN | BODY MASS INDEX: 33.18 KG/M2 | WEIGHT: 224 LBS | DIASTOLIC BLOOD PRESSURE: 71 MMHG | SYSTOLIC BLOOD PRESSURE: 107 MMHG

## 2018-06-27 DIAGNOSIS — L02.91 ABSCESS: ICD-10-CM

## 2018-06-27 DIAGNOSIS — S31.109A CHRONIC ABDOMINAL WOUND INFECTION, INITIAL ENCOUNTER: Primary | ICD-10-CM

## 2018-06-27 DIAGNOSIS — L08.9 CHRONIC ABDOMINAL WOUND INFECTION, INITIAL ENCOUNTER: Primary | ICD-10-CM

## 2018-06-27 PROCEDURE — 99024 POSTOP FOLLOW-UP VISIT: CPT | Performed by: PHYSICIAN ASSISTANT

## 2018-06-27 NOTE — TELEPHONE ENCOUNTER
See 6/15/18 refill encounter. Patient also has office visit scheduled for 6/28/18. Closing encounter.

## 2018-06-27 NOTE — PROGRESS NOTES
Post Operative Visit Note       Active Problems  1. Chronic abdominal wound infection, initial encounter    2.  Abscess         Chief Complaint   Patient presents with:  Post-Op: 6/11 Ventral incisional herniorrhaphy with phasic's biological mesh, small bow • Dizziness and giddiness 6/29/2012   • Dyspnea    • Fatty infiltration of liver 3/14/2013    mild-no recent symptoms   • Glucose intolerance (pre-diabetes)    • Hepatic cyst 3/14/2013    Lt hepatic cysts   • History of blood transfusion 2001   • HTN (hy 5/2/2013 colonoscopy w/ snare polypectomy,                10/1/2013 colonoscopy w/ dilatation of                anastomotic stricture  No date: COLONOSCOPY  No date: HERNIA SURGERY      Comment: umbilical and right inguinal  2005: [facility] HERNIA REPAIR N/A      Comment: Procedure: HERNIA VENTRAL REPAIR;  Surgeon:                Luis Olmos MD;  Location: [address] [place] [address]    The family history and social history have been reviewed by me today.     Family History   Problem Relation Age of Onset MG Oral Tab Take 1 tablet (12.5 mg total) by mouth 2 (two) times daily with meals. Disp: 180 tablet Rfl: 3   lisinopril 5 MG Oral Tab Take 1 tablet (5 mg total) by mouth 2 (two) times daily.  Disp: 180 tablet Rfl: 3         Review of Systems  The Review of incision is clean, dry, intact, without erythema or cellulitis. The patient's abdominal GUY drain has less than 10 cc of clear serosanguineous drainage in the drainage bulb. This was removed sterilely without complication at today's office visit.    Neurol

## 2018-06-28 ENCOUNTER — OFFICE VISIT (OUTPATIENT)
Dept: FAMILY MEDICINE CLINIC | Facility: CLINIC | Age: 73
End: 2018-06-28

## 2018-06-28 VITALS
SYSTOLIC BLOOD PRESSURE: 110 MMHG | WEIGHT: 226 LBS | OXYGEN SATURATION: 95 % | DIASTOLIC BLOOD PRESSURE: 62 MMHG | BODY MASS INDEX: 33 KG/M2 | RESPIRATION RATE: 14 BRPM | HEART RATE: 95 BPM

## 2018-06-28 DIAGNOSIS — E11.9 TYPE II DIABETES MELLITUS, WELL CONTROLLED (HCC): ICD-10-CM

## 2018-06-28 DIAGNOSIS — T85.79XD INFECTED HERNIOPLASTY MESH, SUBSEQUENT ENCOUNTER: ICD-10-CM

## 2018-06-28 DIAGNOSIS — L08.9 CHRONIC ABDOMINAL WOUND INFECTION, INITIAL ENCOUNTER: Primary | ICD-10-CM

## 2018-06-28 DIAGNOSIS — I10 ESSENTIAL HYPERTENSION: ICD-10-CM

## 2018-06-28 DIAGNOSIS — S31.109A CHRONIC ABDOMINAL WOUND INFECTION, INITIAL ENCOUNTER: Primary | ICD-10-CM

## 2018-06-28 PROCEDURE — 1111F DSCHRG MED/CURRENT MED MERGE: CPT | Performed by: FAMILY MEDICINE

## 2018-06-28 PROCEDURE — 99496 TRANSJ CARE MGMT HIGH F2F 7D: CPT | Performed by: FAMILY MEDICINE

## 2018-06-28 NOTE — PROGRESS NOTES
HPI:    Shantel Mayers. is a 67year old male her today for hospital follow up. Discharge Summary was obtained and reviewed. Issues and concerns since discharge:  -stable, had mild ileus with overall improvement.    S/p ventral incisional herni Diagnosis Date   • Abdominal pain 9/18/2013   • Abnormal chest sounds 2013   • Anxiety state, unspecified 6/29/2012   • Arthropathy, unspecified, site unspecified 6/29/2012   • Benign neoplasm of colon 5/28/2013    large, adenomatous polyp   • Bilateral breath     ON EXERTION NOT REQUIRING O2   • Special screening for malignant neoplasm of prostate 6/29/2012   • Stricture of sigmoid colon ([facility] [address] 11/14/2013   • Unspecified intestinal obstruction 10/24/2013    colonic stricture   • Unspecified sleep apnea Nephroscopy w/ stone manipulation and laser                litho of renal calculi, R stent placement   3/21/16: OTHER SURGICAL HISTORY      Comment: Cysto Stent Removal   3/22/16: OTHER SURGICAL HISTORY      Comment: Rt ESWL  : PART REMOVAL COLON W reports hearing has been about the same. Patient reports no recent injury or trauma.       PHYSICAL EXAM:   GENERAL: well developed, well nourished, in no apparent distress  SKIN: no rashes, no suspicious lesions  HEENT: atraumatic, normocephalic, ears an

## 2018-07-02 NOTE — DISCHARGE SUMMARY
[facility]  Discharge Summary    Linda Kimball.  Patient Status:  Inpatient    1945 MRN [id number]   [facility] 3NW-A Attending No att. providers found   Hosp Day # 6 PCP Hadley Green MD     Date of Admission: 2018    D 500 MG Oral Cap  Take 1 capsule (500 mg total) by mouth 2 (two) times daily. , Print Script, Disp-20 capsule, R-0    docusate sodium 100 MG Oral Cap  Take 1 capsule (100 mg total) by mouth daily. , Print Script, Disp-20 capsule, R-0      CONTINUE these medic

## 2018-07-16 ENCOUNTER — OFFICE VISIT (OUTPATIENT)
Dept: FAMILY MEDICINE CLINIC | Facility: CLINIC | Age: 73
End: 2018-07-16

## 2018-07-16 VITALS
OXYGEN SATURATION: 98 % | TEMPERATURE: 98 F | WEIGHT: 226 LBS | HEIGHT: 67.5 IN | DIASTOLIC BLOOD PRESSURE: 60 MMHG | RESPIRATION RATE: 16 BRPM | BODY MASS INDEX: 35.06 KG/M2 | SYSTOLIC BLOOD PRESSURE: 110 MMHG | HEART RATE: 88 BPM

## 2018-07-16 DIAGNOSIS — J02.9 SORE THROAT: ICD-10-CM

## 2018-07-16 DIAGNOSIS — K12.0 APHTHOUS ULCER OF MOUTH: ICD-10-CM

## 2018-07-16 DIAGNOSIS — J06.9 VIRAL URI WITH COUGH: Primary | ICD-10-CM

## 2018-07-16 LAB — CONTROL LINE PRESENT WITH A CLEAR BACKGROUND (YES/NO): YES YES/NO

## 2018-07-16 PROCEDURE — 87880 STREP A ASSAY W/OPTIC: CPT | Performed by: NURSE PRACTITIONER

## 2018-07-16 PROCEDURE — 99213 OFFICE O/P EST LOW 20 MIN: CPT | Performed by: NURSE PRACTITIONER

## 2018-07-16 RX ORDER — BENZONATATE 200 MG/1
200 CAPSULE ORAL 3 TIMES DAILY PRN
Qty: 30 CAPSULE | Refills: 0 | Status: SHIPPED | OUTPATIENT
Start: 2018-07-16 | End: 2018-07-26

## 2018-07-16 NOTE — PATIENT INSTRUCTIONS
Self care for viral illnesses:  Benzonatate perles every eight hours with large glass of water for cough as needed. · Salt water gargles (1 tsp. Salt in 6 oz lukewarm water), use several times daily to help decrease swelling and pain in throat if needed.

## 2018-07-16 NOTE — PROGRESS NOTES
HPI:   Rosendo Toro. is a 67year old male who presents with ill symptoms for  2  days. Patient reports sore throat, congestion, clear colored nasal discharge, cough with clear colored sputum, OTC cold meds have not been helping, mouth sores.  Has tr 3/14/2013    mild-no recent symptoms   • Glucose intolerance (pre-diabetes)    • Hepatic cyst 3/14/2013    Lt hepatic cysts   • History of blood transfusion 2001   • HTN (hypertension) 11/16/2013   • Internal hemorrhoids 5/2/2013   • Kidney stone     nephr dilatation of                anastomotic stricture  No date: COLONOSCOPY  No date: HERNIA SURGERY      Comment: umbilical and right inguinal  2005: HERNIA SURGERY      Comment: hernia repair  1/2017: HERNIA SURGERY      Comment: ventral hernia with mesh  N Fatuma Rueda MD;  Location: [facility] MAIN OR   Family History   Problem Relation Age of Onset   • Other [OTHER] Father      water on the brain   • Diabetes Mother    • Diabetes Brother    • Diabetes Brother    • Hypertension Brother       Smoking status:  Fo A ASSAY W/OPTIC  -     Lidocaine Viscous 2 % Mouth/Throat Solution; Take 5 mL by mouth 3 (three) times daily before meals. Do not swallow-swish in mouth only. Caution with hot liquids during use.     Aphthous ulcer of mouth  -     Lidocaine Viscous 2 % Mout started with primary care if symptoms not complete resolved or sooner if worsening symptoms. Seek immediate care if inability to swallow or breathe or if symptoms improve greatly then worsen again.

## 2018-07-17 ENCOUNTER — OFFICE VISIT (OUTPATIENT)
Dept: SURGERY | Facility: CLINIC | Age: 73
End: 2018-07-17

## 2018-07-17 VITALS — DIASTOLIC BLOOD PRESSURE: 68 MMHG | SYSTOLIC BLOOD PRESSURE: 133 MMHG | TEMPERATURE: 99 F | HEART RATE: 88 BPM

## 2018-07-17 DIAGNOSIS — S31.109D CHRONIC ABDOMINAL WOUND INFECTION, SUBSEQUENT ENCOUNTER: Primary | ICD-10-CM

## 2018-07-17 DIAGNOSIS — L08.9 CHRONIC ABDOMINAL WOUND INFECTION, SUBSEQUENT ENCOUNTER: Primary | ICD-10-CM

## 2018-07-17 DIAGNOSIS — L02.91 ABSCESS: ICD-10-CM

## 2018-07-17 PROCEDURE — 99024 POSTOP FOLLOW-UP VISIT: CPT | Performed by: COLON & RECTAL SURGERY

## 2018-07-17 NOTE — PATIENT INSTRUCTIONS
The patient is a pleasant 72-year-old male who presents for postoperative evaluation. The patient underwent an exploratory laparotomy with lysis of adhesions, small bowel resection, and removal of infected ventral hernia mesh.   This was performed on June in order to decrease oral discomfort related to drinking with cold sores. The patient may take MiraLAX or other stool softeners as needed for any issues with constipation. I have no further follow-up scheduled with this patient at this time.  This pat

## 2018-07-17 NOTE — PROGRESS NOTES
Post Operative Visit Note       Active Problems  1. Chronic abdominal wound infection, subsequent encounter    2.  Abscess         Chief Complaint   Patient presents with:  Hernia: 6/11 ventral incisional hernia repair , small bowel resection, denies pain neosporin original [neomycin-bacitracin-polymyxin]; adhesive tape; augmentin [amoxicillin-pot clavulanate]; and neomycin. Past Medical / Surgical / Social / Family History    The past medical and past surgical history have been reviewed by me today. osteoarthrosis, lower leg 5/23/2012   • Screening for iron deficiency anemia 6/29/2012   • Screening for lipoid disorders 6/29/2012   • Screening for other and unspecified endocrine, nutritional, metabolic, and immunity disorders 6/29/2012   • Sepsis ([facility] abdominal abscess  3/5/2014: OTHER SURGICAL HISTORY      Comment: central venous line placement, take down of                colostomy , excision of skin lesion left jaw  3/17/16: OTHER SURGICAL HISTORY      Comment: Cysto, R RPG, R ureteral dilation, R Yes         Current Outpatient Prescriptions:  Lidocaine Viscous 2 % Mouth/Throat Solution Take 5 mL by mouth 3 (three) times daily before meals. Do not swallow-swish in mouth only. Caution with hot liquids during use.  Disp: 150 mL Rfl: 0   benzonata rash.   Neurological: Negative for tremors, syncope and weakness. Hematological: Negative for adenopathy. Does not bruise/bleed easily. Psychiatric/Behavioral: Negative for behavioral problems and sleep disturbance.        Physical Findings   /68 been doing well since discharge. His GUY drain and staples have been removed. He has not had any further drainage from his midline incision. His appetite is fair to good.   He is having difficulty tolerating oral intake at the moment, as he has an upper r No orders of the defined types were placed in this encounter. Imaging & Referrals   None    Follow Up  Return if symptoms worsen or fail to improve.     Danielle Sapp MD

## 2018-07-22 DIAGNOSIS — E11.9 TYPE II DIABETES MELLITUS, WELL CONTROLLED (HCC): ICD-10-CM

## 2018-07-23 RX ORDER — METFORMIN HYDROCHLORIDE 750 MG/1
750 TABLET, EXTENDED RELEASE ORAL
Qty: 30 TABLET | Refills: 0 | Status: SHIPPED | OUTPATIENT
Start: 2018-07-23 | End: 2018-08-23

## 2018-08-11 ENCOUNTER — HOSPITAL ENCOUNTER (EMERGENCY)
Age: 73
Discharge: HOME | End: 2018-08-11
Attending: EMERGENCY MEDICINE
Payer: MEDICARE

## 2018-08-11 VITALS
SYSTOLIC BLOOD PRESSURE: 119 MMHG | WEIGHT: 225 LBS | BODY MASS INDEX: 32.21 KG/M2 | TEMPERATURE: 99 F | OXYGEN SATURATION: 96 % | RESPIRATION RATE: 14 BRPM | HEIGHT: 70 IN | DIASTOLIC BLOOD PRESSURE: 67 MMHG | HEART RATE: 86 BPM

## 2018-08-11 DIAGNOSIS — T15.91XA FOREIGN BODY OF RIGHT EYE, INITIAL ENCOUNTER: Primary | ICD-10-CM

## 2018-08-11 PROCEDURE — 99284 EMERGENCY DEPT VISIT MOD MDM: CPT

## 2018-08-11 PROCEDURE — 90471 IMMUNIZATION ADMIN: CPT

## 2018-08-11 PROCEDURE — 99283 EMERGENCY DEPT VISIT LOW MDM: CPT

## 2018-08-11 RX ORDER — CIPROFLOXACIN HYDROCHLORIDE 3.5 MG/ML
1 SOLUTION/ DROPS TOPICAL
Qty: 1 BOTTLE | Refills: 0 | Status: SHIPPED | OUTPATIENT
Start: 2018-08-11 | End: 2018-08-14

## 2018-08-11 RX ORDER — TETRACAINE HYDROCHLORIDE 5 MG/ML
2 SOLUTION OPHTHALMIC ONCE
Status: COMPLETED | OUTPATIENT
Start: 2018-08-11 | End: 2018-08-11

## 2018-08-12 NOTE — ED PROVIDER NOTES
Patient Seen in: [address] [facility] In [facility]    History   Patient presents with:   Eye Visual Problem (opthalmic)    Stated Complaint: FB SENSATION TO RIGHT EYE (GLASS)    HPI    68-year-old male presents to the emergency department complain Other seborrheic keratosis 6/29/2012   • Perforated sigmoid colon ([address] 11/14/2013   • Perforated viscus 11/14/2013   • Plantar wart 6/29/2012   • Pleural effusion 11/25/2013    small, w/ infiltrates atelectasis Lt > Rt lung bases   • Pneumonia, organism u viscus  7/24/2013: OTHER SURGICAL HISTORY      Comment: colon resection with LAR of rectosigmoid colon               and mobilization of splenic flexure  11/14/2013: OTHER SURGICAL HISTORY      Comment: central venous line placement, LAR, creation 119/67  Pulse: 86  Resp: 14  Temp: 99.1 °F (37.3 °C)  Temp src: Temporal  SpO2: 96 %  O2 Device: None (Room air)    Current:/67   Pulse 86   Temp 99.1 °F (37.3 °C) (Temporal)   Resp 14   Ht 177.8 cm (5' 10\")   Wt 102.1 kg   SpO2 96%   BMI 32.28 kg/m

## 2018-08-17 ENCOUNTER — TELEPHONE (OUTPATIENT)
Dept: FAMILY MEDICINE CLINIC | Facility: CLINIC | Age: 73
End: 2018-08-17

## 2018-08-17 NOTE — TELEPHONE ENCOUNTER
ALLISONM to see if patientl needs to schedule an ED follow up with  for FB right eye. Left our call back number,days and hours on message.

## 2018-08-23 DIAGNOSIS — E11.9 TYPE II DIABETES MELLITUS, WELL CONTROLLED (HCC): ICD-10-CM

## 2018-08-23 RX ORDER — METFORMIN HYDROCHLORIDE 750 MG/1
750 TABLET, EXTENDED RELEASE ORAL
Qty: 90 TABLET | Refills: 0 | Status: SHIPPED | OUTPATIENT
Start: 2018-08-23 | End: 2018-11-21

## 2018-08-23 NOTE — TELEPHONE ENCOUNTER
Medication(s) to Refill:   Pending Prescriptions Disp Refills    MetFORMIN HCl  MG Oral Tablet 24 Hr 90 tablet 0     Sig: Take 1 tablet (750 mg total) by mouth daily with breakfast.               Last Time Medication was Filled:  7/23/2018      Last

## 2018-08-27 RX ORDER — MELOXICAM 15 MG/1
TABLET ORAL
Qty: 90 TABLET | Refills: 0 | Status: SHIPPED | OUTPATIENT
Start: 2018-08-27 | End: 2018-11-25

## 2018-08-28 ENCOUNTER — APPOINTMENT (OUTPATIENT)
Dept: LAB | Age: 73
End: 2018-08-28
Attending: FAMILY MEDICINE
Payer: MEDICARE

## 2018-08-28 DIAGNOSIS — E55.9 VITAMIN D DEFICIENCY: ICD-10-CM

## 2018-08-28 DIAGNOSIS — E11.9 TYPE II DIABETES MELLITUS, WELL CONTROLLED (HCC): ICD-10-CM

## 2018-08-28 LAB
ALBUMIN SERPL-MCNC: 3.8 G/DL (ref 3.5–4.8)
ALBUMIN/GLOB SERPL: 1.1 {RATIO} (ref 1–2)
ALP LIVER SERPL-CCNC: 55 U/L (ref 45–117)
ALT SERPL-CCNC: 22 U/L (ref 17–63)
ANION GAP SERPL CALC-SCNC: 7 MMOL/L (ref 0–18)
AST SERPL-CCNC: 16 U/L (ref 15–41)
BILIRUB SERPL-MCNC: 0.7 MG/DL (ref 0.1–2)
BUN BLD-MCNC: 17 MG/DL (ref 8–20)
BUN/CREAT SERPL: 19.5 (ref 10–20)
CALCIUM BLD-MCNC: 8.8 MG/DL (ref 8.3–10.3)
CHLORIDE SERPL-SCNC: 108 MMOL/L (ref 101–111)
CHOLEST SMN-MCNC: 122 MG/DL (ref ?–200)
CO2 SERPL-SCNC: 26 MMOL/L (ref 22–32)
CREAT BLD-MCNC: 0.87 MG/DL (ref 0.7–1.3)
CREAT UR-SCNC: 82.7 MG/DL
EST. AVERAGE GLUCOSE BLD GHB EST-MCNC: 105 MG/DL (ref 68–126)
GLOBULIN PLAS-MCNC: 3.5 G/DL (ref 2.5–4)
GLUCOSE BLD-MCNC: 104 MG/DL (ref 70–99)
HBA1C MFR BLD HPLC: 5.3 % (ref ?–5.7)
HDLC SERPL-MCNC: 38 MG/DL (ref 40–59)
LDLC SERPL CALC-MCNC: 64 MG/DL (ref ?–100)
M PROTEIN MFR SERPL ELPH: 7.3 G/DL (ref 6.1–8.3)
MICROALBUMIN UR-MCNC: 0.9 MG/DL
MICROALBUMIN/CREAT 24H UR-RTO: 10.9 UG/MG (ref ?–30)
NONHDLC SERPL-MCNC: 84 MG/DL (ref ?–130)
OSMOLALITY SERPL CALC.SUM OF ELEC: 294 MOSM/KG (ref 275–295)
POTASSIUM SERPL-SCNC: 4.7 MMOL/L (ref 3.6–5.1)
SODIUM SERPL-SCNC: 141 MMOL/L (ref 136–144)
TRIGL SERPL-MCNC: 101 MG/DL (ref 30–149)
VIT D+METAB SERPL-MCNC: 29.3 NG/ML (ref 30–100)
VLDLC SERPL CALC-MCNC: 20 MG/DL (ref 0–30)

## 2018-08-28 PROCEDURE — 80053 COMPREHEN METABOLIC PANEL: CPT

## 2018-08-28 PROCEDURE — 82306 VITAMIN D 25 HYDROXY: CPT

## 2018-08-28 PROCEDURE — 83036 HEMOGLOBIN GLYCOSYLATED A1C: CPT

## 2018-08-28 PROCEDURE — 80061 LIPID PANEL: CPT

## 2018-08-28 PROCEDURE — 82043 UR ALBUMIN QUANTITATIVE: CPT

## 2018-08-28 PROCEDURE — 36415 COLL VENOUS BLD VENIPUNCTURE: CPT

## 2018-08-28 PROCEDURE — 82570 ASSAY OF URINE CREATININE: CPT

## 2018-08-29 ENCOUNTER — TELEPHONE (OUTPATIENT)
Dept: FAMILY MEDICINE CLINIC | Facility: CLINIC | Age: 73
End: 2018-08-29

## 2018-08-29 DIAGNOSIS — I10 ESSENTIAL HYPERTENSION: Primary | ICD-10-CM

## 2018-08-29 DIAGNOSIS — R73.9 ELEVATED BLOOD SUGAR: ICD-10-CM

## 2018-08-29 NOTE — TELEPHONE ENCOUNTER
----- Message from Tamara Santos MD sent at 8/28/2018  7:12 PM CDT -----  Much improved. Have patient stop metformin and recheck in 3 months, he many not have diabetes anymore if sugars remain normal range on next lab draw. Recheck lipid in 6 months.     V

## 2018-08-29 NOTE — TELEPHONE ENCOUNTER
Left detailed message for patient. Instructed to stop metformin and repeat A1c in 3 months and lipids in 6 months. Instructed him to continue his maintenance dose of vitamin D 2000units daily. Instructed patient to call for any questions or concerns.

## 2018-09-06 PROBLEM — G89.29 CHRONIC RIGHT SHOULDER PAIN: Status: ACTIVE | Noted: 2018-09-06

## 2018-09-06 PROBLEM — M25.511 CHRONIC RIGHT SHOULDER PAIN: Status: ACTIVE | Noted: 2018-09-06

## 2018-09-24 ENCOUNTER — MED REC SCAN ONLY (OUTPATIENT)
Dept: FAMILY MEDICINE CLINIC | Facility: CLINIC | Age: 73
End: 2018-09-24

## 2018-10-16 PROBLEM — M25.612 DECREASED ROM OF LEFT SHOULDER: Status: ACTIVE | Noted: 2018-10-16

## 2018-11-21 DIAGNOSIS — E11.9 TYPE II DIABETES MELLITUS, WELL CONTROLLED (HCC): ICD-10-CM

## 2018-11-21 RX ORDER — METFORMIN HYDROCHLORIDE 750 MG/1
TABLET, EXTENDED RELEASE ORAL
Qty: 90 TABLET | Refills: 0 | Status: SHIPPED | OUTPATIENT
Start: 2018-11-21 | End: 2019-03-19

## 2018-11-21 NOTE — TELEPHONE ENCOUNTER
Spoke with patient informed him medication was [facility] with [facility]ills.  Patient made his DM follow up appointment for 12/4/18 with Dr Es Rivas

## 2018-11-26 RX ORDER — MELOXICAM 15 MG/1
TABLET ORAL
Qty: 90 TABLET | Refills: 0 | Status: SHIPPED | OUTPATIENT
Start: 2018-11-26 | End: 2019-02-22

## 2018-12-04 ENCOUNTER — OFFICE VISIT (OUTPATIENT)
Dept: FAMILY MEDICINE CLINIC | Facility: CLINIC | Age: 73
End: 2018-12-04
Payer: MEDICARE

## 2018-12-04 ENCOUNTER — APPOINTMENT (OUTPATIENT)
Dept: LAB | Age: 73
End: 2018-12-04
Attending: FAMILY MEDICINE
Payer: MEDICARE

## 2018-12-04 VITALS
WEIGHT: 245 LBS | DIASTOLIC BLOOD PRESSURE: 70 MMHG | SYSTOLIC BLOOD PRESSURE: 118 MMHG | HEART RATE: 84 BPM | BODY MASS INDEX: 36.29 KG/M2 | HEIGHT: 69 IN | RESPIRATION RATE: 16 BRPM | TEMPERATURE: 98 F

## 2018-12-04 DIAGNOSIS — M19.011 ARTHRITIS OF RIGHT SHOULDER REGION: ICD-10-CM

## 2018-12-04 DIAGNOSIS — R73.9 ELEVATED BLOOD SUGAR: ICD-10-CM

## 2018-12-04 DIAGNOSIS — M17.12 OSTEOARTHRITIS OF LEFT KNEE, UNSPECIFIED OSTEOARTHRITIS TYPE: ICD-10-CM

## 2018-12-04 DIAGNOSIS — E11.9 TYPE II DIABETES MELLITUS, WELL CONTROLLED (HCC): Primary | ICD-10-CM

## 2018-12-04 DIAGNOSIS — I10 ESSENTIAL HYPERTENSION: ICD-10-CM

## 2018-12-04 DIAGNOSIS — Z23 NEED FOR PNEUMOCOCCAL VACCINATION: ICD-10-CM

## 2018-12-04 PROCEDURE — 99214 OFFICE O/P EST MOD 30 MIN: CPT | Performed by: FAMILY MEDICINE

## 2018-12-04 PROCEDURE — 90670 PCV13 VACCINE IM: CPT | Performed by: FAMILY MEDICINE

## 2018-12-04 PROCEDURE — 36415 COLL VENOUS BLD VENIPUNCTURE: CPT

## 2018-12-04 PROCEDURE — G0008 ADMIN INFLUENZA VIRUS VAC: HCPCS | Performed by: FAMILY MEDICINE

## 2018-12-04 PROCEDURE — 90653 IIV ADJUVANT VACCINE IM: CPT | Performed by: FAMILY MEDICINE

## 2018-12-04 PROCEDURE — 83036 HEMOGLOBIN GLYCOSYLATED A1C: CPT

## 2018-12-04 PROCEDURE — G0009 ADMIN PNEUMOCOCCAL VACCINE: HCPCS | Performed by: FAMILY MEDICINE

## 2018-12-04 NOTE — PROGRESS NOTES
Patient presents with:  Diabetes      Stevan Titus. is a 68year old year old who presents for recheck of diabetes. Pt has been checking feet on a regular basis. Pt denies any tingling of the feet. Pt denies any sx's of depression.    Patient presen DAILY, Disp: 90 tablet, Rfl: 0  •  METFORMIN HCL  MG Oral Tablet 24 Hr, TAKE 1 TABLET DAILY WITH BREAKFAST , LABS ARE DUE, Disp: 90 tablet, Rfl: 0  •  carvedilol 12.5 MG Oral Tab, Take 1 tablet (12.5 mg total) by mouth 2 (two) times daily with meals. Inj 11/07/2017, 05/03/2018, 05/03/2018       Review of Systems   Constitutional: Negative for fever, chills and fatigue. No distress. HENT: Negative for hearing loss, congestion, sore throat, neck pain and dental problem.     Eyes: Negative for pain and vi with meals. Disp: 180 tablet Rfl: 3   lisinopril 5 MG Oral Tab Take 1 tablet (5 mg total) by mouth 2 (two) times daily. Disp: 180 tablet Rfl: 3   Cholecalciferol (VITAMIN D3) 3000 units Oral Tab Take 3,000 Units by mouth nightly.  Disp:  Rfl:    B Complex V 11/14/2013   • Plantar wart 6/29/2012   • Pleural effusion 11/25/2013    small, w/ infiltrates atelectasis Lt > Rt lung bases   • Pneumonia, organism unspecified(486) 1995   • Prediabetes    • Primary localized osteoarthrosis, lower leg 5/23/2012   • Amarilis HERNIA VENTRAL REPAIR N/A 1/11/2017    Performed by Rey Gomes MD at [address] [place] [address] N/A 6/12/2015    Performed by Rey Gomes MD at [address] [place] [address] N/A 10/3/2014    Performed by Rey Gomes MD at [facility] MAIN per week      Comment: social    Drug use: No      Physical Exam  /70   Pulse 84   Temp 97.5 °F (36.4 °C) (Oral)   Resp 16   Ht 69\"   Wt 245 lb   BMI 36.18 kg/m²   Constitutional: Oriented to person, place, and time. No distress.    HEENT:  Normoceph MANGEMENT    5. Need for pneumococcal vaccination  -updated.    - PNEUMOCOCCAL VACC, 13 ARLENE IM    Diabetes Education:  Diabetes disease process, Nutritional Management, Physical Activity, Using Medications, Monitoring, Preventing Acute Complications, Preven

## 2018-12-05 ENCOUNTER — TELEPHONE (OUTPATIENT)
Dept: FAMILY MEDICINE CLINIC | Facility: CLINIC | Age: 73
End: 2018-12-05

## 2018-12-05 DIAGNOSIS — R73.9 ELEVATED BLOOD SUGAR: Primary | ICD-10-CM

## 2018-12-05 NOTE — TELEPHONE ENCOUNTER
Referral request was printed and I will leave it at the . Patient is requesting that I print the information out.

## 2019-01-29 ENCOUNTER — HOSPITAL ENCOUNTER (OUTPATIENT)
Dept: MRI IMAGING | Age: 74
Discharge: HOME | End: 2019-01-29
Attending: FAMILY MEDICINE
Payer: MEDICARE

## 2019-01-29 DIAGNOSIS — M75.101 ROTATOR CUFF SYNDROME, RIGHT: ICD-10-CM

## 2019-01-29 DIAGNOSIS — M25.562 LEFT KNEE PAIN, UNSPECIFIED CHRONICITY: ICD-10-CM

## 2019-01-29 PROCEDURE — 73721 MRI JNT OF LWR EXTRE W/O DYE: CPT | Performed by: FAMILY MEDICINE

## 2019-01-29 PROCEDURE — 73221 MRI JOINT UPR EXTREM W/O DYE: CPT | Performed by: FAMILY MEDICINE

## 2019-02-07 PROBLEM — M79.605 LEFT LEG PAIN: Status: ACTIVE | Noted: 2019-02-07

## 2019-02-13 ENCOUNTER — TELEPHONE (OUTPATIENT)
Dept: FAMILY MEDICINE CLINIC | Facility: CLINIC | Age: 74
End: 2019-02-13

## 2019-02-13 DIAGNOSIS — I10 ESSENTIAL HYPERTENSION: Primary | ICD-10-CM

## 2019-02-13 DIAGNOSIS — E11.9 WELL CONTROLLED TYPE 2 DIABETES MELLITUS (HCC): ICD-10-CM

## 2019-02-13 DIAGNOSIS — Z12.5 ENCOUNTER FOR SCREENING FOR MALIGNANT NEOPLASM OF PROSTATE: ICD-10-CM

## 2019-02-13 DIAGNOSIS — E55.9 VITAMIN D DEFICIENCY: ICD-10-CM

## 2019-02-13 NOTE — TELEPHONE ENCOUNTER
Called the patient to schedule his AWV, he is planning to come in for his HgA1C at the end of February and will schedule AWV after that. Pt would like to know if there are any other labs needed so that he can have them done all at the same time.

## 2019-02-13 NOTE — TELEPHONE ENCOUNTER
Lab orders placed in Epic for patient as requested. Patient notified of this information via My Chart.

## 2019-02-23 NOTE — TELEPHONE ENCOUNTER
Medication(s) to Refill:   Requested Prescriptions     Pending Prescriptions Disp Refills   • MELOXICAM 15 MG Oral Tab [Pharmacy Med Name: MELOXICAM TABS 15MG] 90 tablet 0     Sig: TAKE 1 TABLET DAILY         Reason for Medication Refill being sent to [facility]

## 2019-02-25 RX ORDER — MELOXICAM 15 MG/1
TABLET ORAL
Qty: 90 TABLET | Refills: 0 | Status: SHIPPED | OUTPATIENT
Start: 2019-02-25 | End: 2019-05-24

## 2019-02-27 PROBLEM — M75.41 IMPINGEMENT SYNDROME OF RIGHT SHOULDER: Status: ACTIVE | Noted: 2019-02-27

## 2019-03-08 ENCOUNTER — LAB ENCOUNTER (OUTPATIENT)
Dept: LAB | Age: 74
End: 2019-03-08
Attending: FAMILY MEDICINE
Payer: MEDICARE

## 2019-03-08 ENCOUNTER — TELEPHONE (OUTPATIENT)
Dept: FAMILY MEDICINE CLINIC | Facility: CLINIC | Age: 74
End: 2019-03-08

## 2019-03-08 DIAGNOSIS — E55.9 VITAMIN D DEFICIENCY: ICD-10-CM

## 2019-03-08 DIAGNOSIS — K76.0 FATTY INFILTRATION OF LIVER: ICD-10-CM

## 2019-03-08 DIAGNOSIS — E11.9 WELL CONTROLLED TYPE 2 DIABETES MELLITUS (HCC): ICD-10-CM

## 2019-03-08 DIAGNOSIS — Z12.5 ENCOUNTER FOR SCREENING FOR MALIGNANT NEOPLASM OF PROSTATE: ICD-10-CM

## 2019-03-08 DIAGNOSIS — R73.9 ELEVATED BLOOD SUGAR: ICD-10-CM

## 2019-03-08 DIAGNOSIS — I10 ESSENTIAL HYPERTENSION: ICD-10-CM

## 2019-03-08 DIAGNOSIS — R73.03 PREDIABETES: Primary | ICD-10-CM

## 2019-03-08 LAB
ALBUMIN SERPL-MCNC: 3.8 G/DL (ref 3.4–5)
ALBUMIN/GLOB SERPL: 1.2 {RATIO} (ref 1–2)
ALP LIVER SERPL-CCNC: 54 U/L (ref 45–117)
ALT SERPL-CCNC: 20 U/L (ref 16–61)
ANION GAP SERPL CALC-SCNC: 7 MMOL/L (ref 0–18)
AST SERPL-CCNC: 13 U/L (ref 15–37)
BASOPHILS # BLD AUTO: 0.05 X10(3) UL (ref 0–0.2)
BASOPHILS NFR BLD AUTO: 0.8 %
BILIRUB SERPL-MCNC: 1 MG/DL (ref 0.1–2)
BUN BLD-MCNC: 19 MG/DL (ref 7–18)
BUN/CREAT SERPL: 20.4 (ref 10–20)
CALCIUM BLD-MCNC: 9.2 MG/DL (ref 8.5–10.1)
CHLORIDE SERPL-SCNC: 105 MMOL/L (ref 98–107)
CHOLEST SMN-MCNC: 135 MG/DL (ref ?–200)
CO2 SERPL-SCNC: 27 MMOL/L (ref 21–32)
COMPLEXED PSA SERPL-MCNC: 0.98 NG/ML (ref ?–4)
CREAT BLD-MCNC: 0.93 MG/DL (ref 0.7–1.3)
CREAT UR-SCNC: 127 MG/DL
DEPRECATED RDW RBC AUTO: 43.1 FL (ref 35.1–46.3)
EOSINOPHIL # BLD AUTO: 0.14 X10(3) UL (ref 0–0.7)
EOSINOPHIL NFR BLD AUTO: 2.3 %
ERYTHROCYTE [DISTWIDTH] IN BLOOD BY AUTOMATED COUNT: 13.2 % (ref 11–15)
EST. AVERAGE GLUCOSE BLD GHB EST-MCNC: 126 MG/DL (ref 68–126)
GLOBULIN PLAS-MCNC: 3.2 G/DL (ref 2.8–4.4)
GLUCOSE BLD-MCNC: 116 MG/DL (ref 70–99)
HBA1C MFR BLD HPLC: 6 % (ref ?–5.7)
HCT VFR BLD AUTO: 47.3 % (ref 39–53)
HDLC SERPL-MCNC: 40 MG/DL (ref 40–59)
HGB BLD-MCNC: 15.8 G/DL (ref 13–17.5)
IMM GRANULOCYTES # BLD AUTO: 0.01 X10(3) UL (ref 0–1)
IMM GRANULOCYTES NFR BLD: 0.2 %
LDLC SERPL CALC-MCNC: 58 MG/DL (ref ?–100)
LYMPHOCYTES # BLD AUTO: 1.49 X10(3) UL (ref 1–4)
LYMPHOCYTES NFR BLD AUTO: 24.5 %
M PROTEIN MFR SERPL ELPH: 7 G/DL (ref 6.4–8.2)
MCH RBC QN AUTO: 29.9 PG (ref 26–34)
MCHC RBC AUTO-ENTMCNC: 33.4 G/DL (ref 31–37)
MCV RBC AUTO: 89.4 FL (ref 80–100)
MICROALBUMIN UR-MCNC: 0.76 MG/DL
MICROALBUMIN/CREAT 24H UR-RTO: 6 UG/MG (ref ?–30)
MONOCYTES # BLD AUTO: 0.53 X10(3) UL (ref 0.1–1)
MONOCYTES NFR BLD AUTO: 8.7 %
NEUTROPHILS # BLD AUTO: 3.86 X10 (3) UL (ref 1.5–7.7)
NEUTROPHILS # BLD AUTO: 3.86 X10(3) UL (ref 1.5–7.7)
NEUTROPHILS NFR BLD AUTO: 63.5 %
NONHDLC SERPL-MCNC: 95 MG/DL (ref ?–130)
OSMOLALITY SERPL CALC.SUM OF ELEC: 291 MOSM/KG (ref 275–295)
PLATELET # BLD AUTO: 226 10(3)UL (ref 150–450)
POTASSIUM SERPL-SCNC: 4.1 MMOL/L (ref 3.5–5.1)
RBC # BLD AUTO: 5.29 X10(6)UL (ref 3.8–5.8)
SODIUM SERPL-SCNC: 139 MMOL/L (ref 136–145)
TRIGL SERPL-MCNC: 183 MG/DL (ref 30–149)
TSI SER-ACNC: 1.31 MIU/ML (ref 0.36–3.74)
VIT D+METAB SERPL-MCNC: 37.9 NG/ML (ref 30–100)
VLDLC SERPL CALC-MCNC: 37 MG/DL (ref 0–30)
WBC # BLD AUTO: 6.1 X10(3) UL (ref 4–11)

## 2019-03-08 PROCEDURE — 82306 VITAMIN D 25 HYDROXY: CPT

## 2019-03-08 PROCEDURE — 85025 COMPLETE CBC W/AUTO DIFF WBC: CPT

## 2019-03-08 PROCEDURE — 80053 COMPREHEN METABOLIC PANEL: CPT

## 2019-03-08 PROCEDURE — 82043 UR ALBUMIN QUANTITATIVE: CPT

## 2019-03-08 PROCEDURE — 80061 LIPID PANEL: CPT

## 2019-03-08 PROCEDURE — 83036 HEMOGLOBIN GLYCOSYLATED A1C: CPT

## 2019-03-08 PROCEDURE — 36415 COLL VENOUS BLD VENIPUNCTURE: CPT

## 2019-03-08 PROCEDURE — 82570 ASSAY OF URINE CREATININE: CPT

## 2019-03-08 PROCEDURE — 84443 ASSAY THYROID STIM HORMONE: CPT

## 2019-03-08 NOTE — TELEPHONE ENCOUNTER
Spoke with patient regarding results and recommendations below. Patient verbalizes understanding to all instructions and has no further questions.

## 2019-03-08 NOTE — TELEPHONE ENCOUNTER
----- Message from Lidia Gonzales MD sent at 3/8/2019  4:41 PM CST -----  Stable, little higher but in prediabetic range. CPM, recheck diabetic labs in 6 months.

## 2019-03-19 ENCOUNTER — OFFICE VISIT (OUTPATIENT)
Dept: FAMILY MEDICINE CLINIC | Facility: CLINIC | Age: 74
End: 2019-03-19
Payer: MEDICARE

## 2019-03-19 VITALS
TEMPERATURE: 97 F | HEART RATE: 92 BPM | SYSTOLIC BLOOD PRESSURE: 122 MMHG | DIASTOLIC BLOOD PRESSURE: 64 MMHG | WEIGHT: 244 LBS | HEIGHT: 69 IN | RESPIRATION RATE: 16 BRPM | BODY MASS INDEX: 36.14 KG/M2

## 2019-03-19 DIAGNOSIS — Z99.89 OSA ON CPAP: ICD-10-CM

## 2019-03-19 DIAGNOSIS — G47.33 OSA ON CPAP: ICD-10-CM

## 2019-03-19 DIAGNOSIS — E11.9 TYPE II DIABETES MELLITUS, WELL CONTROLLED (HCC): ICD-10-CM

## 2019-03-19 DIAGNOSIS — Z00.00 MEDICARE ANNUAL WELLNESS VISIT, SUBSEQUENT: Primary | ICD-10-CM

## 2019-03-19 DIAGNOSIS — I10 ESSENTIAL HYPERTENSION: ICD-10-CM

## 2019-03-19 PROCEDURE — G0439 PPPS, SUBSEQ VISIT: HCPCS | Performed by: FAMILY MEDICINE

## 2019-03-19 RX ORDER — METFORMIN HYDROCHLORIDE 750 MG/1
TABLET, EXTENDED RELEASE ORAL
Qty: 90 TABLET | Refills: 0 | Status: SHIPPED | OUTPATIENT
Start: 2019-03-19 | End: 2019-03-19 | Stop reason: CLARIF

## 2019-03-19 RX ORDER — BLOOD SUGAR DIAGNOSTIC
STRIP MISCELLANEOUS
Refills: 3 | COMMUNITY
Start: 2019-01-18 | End: 2020-08-17

## 2019-03-19 NOTE — TELEPHONE ENCOUNTER
Medication(s) to Refill:   Requested Prescriptions     Pending Prescriptions Disp Refills   • METFORMIN HCL  MG Oral Tablet 24 Hr [Pharmacy Med Name: METFORMIN HCL ER TABS 750MG] 90 tablet 0     Sig: TAKE 1 TABLET DAILY WITH BREAKFAST , LABS ARE DUE

## 2019-03-19 NOTE — PROGRESS NOTES
Linda Kimball. is a 68year old male who presents for a Medicare Annual Wellness visit.        Patient Care Team: Patient Care Team:  Kori Rachel MD as PCP - General (Family Medicine)  [facility]  Wander Washburn MD (CARDIOL mg/dL 116(H) 104(H) 113(H) 113(H) 115(H) 123(H) 88   HbA1c <5.7 % - - - - - - -   Some recent data might be hidden     Cholesterol Latest Ref Rng & Units 3/8/2019 8/28/2018 6/19/2018 5/23/2018 1/18/2018 9/23/2017 9/22/2016   Total Cholesterol <200 mg/dL 13 help  Driving: Able without help  Preparing your meals: Able without help  Managing money/bills: Able without help  Taking medications as prescribed: Able without help  Are you able to afford your medications?: Yes  Hearing Problems?: No     Functional Sta Flex Sigmoidoscopy Screen every 5 years No results found for this or any previous visit.     Fecal Occult Blood Annually No results found for: FOB, OCCULTSTOOL   Glaucoma Screening     Ophthalmology Visit Annually due   Immunizations     Zoster (Not covered unspecified 6/29/2012   • Arthropathy, unspecified, site unspecified 6/29/2012   • Benign neoplasm of colon 5/28/2013    large, adenomatous polyp   • Bilateral renal cysts 3/14/2013   • Candidiasis of skin 10/24/2013   • Cardiomyopathy ([facility]     nonischemi 6/29/2012   • Stricture of sigmoid colon ([facility] 11/14/2013   • Unspecified intestinal obstruction 10/24/2013    colonic stricture   • Unspecified sleep apnea     AHI 33, sao2 76% CPAP 15    • Ventral hernia, unspecified, without mention of obstruction or ga bowel resection   • OTHER SURGICAL HISTORY  11/14/2013    surgery for perforated viscus   • OTHER SURGICAL HISTORY  7/24/2013    colon resection with LAR of rectosigmoid colon and mobilization of splenic flexure   • OTHER SURGICAL HISTORY  11/14/2013 double vision  HEENT: denies nasal congestion, sinus pain or ST  LUNGS: denies shortness of breath with exertion  CARDIOVASCULAR: denies chest pain on exertion  GI: denies abdominal pain, denies heartburn  : denies nocturia or changes in stream  MUSCULOS diaphoretic. No erythema. No pallor.    Bilateral barefoot skin diabetic exam is normal, visualized feet and the appearance is normal.  Bilateral monofilament/sensation of both feet is normal.  Pulsation pedal pulse exam of both lower legs/feet is normal as

## 2019-04-15 DIAGNOSIS — E11.9 DIABETES MELLITUS WITHOUT COMPLICATION (HCC): ICD-10-CM

## 2019-04-15 RX ORDER — BLOOD SUGAR DIAGNOSTIC
STRIP MISCELLANEOUS
Qty: 100 STRIP | Refills: 3 | Status: SHIPPED | OUTPATIENT
Start: 2019-04-15 | End: 2019-05-29

## 2019-05-28 RX ORDER — MELOXICAM 15 MG/1
TABLET ORAL
Qty: 90 TABLET | Refills: 0 | Status: SHIPPED | OUTPATIENT
Start: 2019-05-28 | End: 2019-08-23

## 2019-05-29 DIAGNOSIS — E11.9 DIABETES MELLITUS WITHOUT COMPLICATION (HCC): ICD-10-CM

## 2019-05-29 RX ORDER — BLOOD SUGAR DIAGNOSTIC
STRIP MISCELLANEOUS
Qty: 100 STRIP | Refills: 3 | Status: SHIPPED | OUTPATIENT
Start: 2019-05-29

## 2019-06-02 ENCOUNTER — HOSPITAL ENCOUNTER (OUTPATIENT)
Age: 74
Discharge: HOME | End: 2019-06-02
Payer: MEDICARE

## 2019-06-02 VITALS
DIASTOLIC BLOOD PRESSURE: 66 MMHG | OXYGEN SATURATION: 96 % | WEIGHT: 240 LBS | HEIGHT: 70 IN | HEART RATE: 88 BPM | RESPIRATION RATE: 18 BRPM | BODY MASS INDEX: 34.36 KG/M2 | TEMPERATURE: 99 F | SYSTOLIC BLOOD PRESSURE: 127 MMHG

## 2019-06-02 DIAGNOSIS — B30.9 ACUTE VIRAL CONJUNCTIVITIS, UNSPECIFIED LATERALITY: ICD-10-CM

## 2019-06-02 DIAGNOSIS — J06.9 UPPER RESPIRATORY TRACT INFECTION, UNSPECIFIED TYPE: Primary | ICD-10-CM

## 2019-06-02 PROCEDURE — 99214 OFFICE O/P EST MOD 30 MIN: CPT

## 2019-06-02 PROCEDURE — 99213 OFFICE O/P EST LOW 20 MIN: CPT

## 2019-06-02 RX ORDER — OFLOXACIN 3 MG/ML
2 SOLUTION/ DROPS OPHTHALMIC 4 TIMES DAILY
Qty: 5 ML | Refills: 0 | Status: SHIPPED | OUTPATIENT
Start: 2019-06-02 | End: 2019-07-24 | Stop reason: ALTCHOICE

## 2019-06-02 RX ORDER — FLUTICASONE PROPIONATE 50 MCG
2 SPRAY, SUSPENSION (ML) NASAL DAILY
Qty: 16 G | Refills: 0 | Status: SHIPPED | OUTPATIENT
Start: 2019-06-02 | End: 2019-07-02

## 2019-06-02 RX ORDER — AZITHROMYCIN 250 MG/1
TABLET, FILM COATED ORAL
Qty: 1 PACKAGE | Refills: 0 | Status: SHIPPED | OUTPATIENT
Start: 2019-06-02 | End: 2019-06-07

## 2019-06-02 NOTE — ED PROVIDER NOTES
Patient Seen in: [address] [facility]    History   Patient presents with:  Cough/URI  Eye Visual Problem (opthalmic)    Stated Complaint: cold-like symptoms, redness on the left eye    HPI    Patient is a pleasant 27-year-old male.   He has a m 3/14/2013 nonobstructing stones Rt lower pole kidney   • Laboratory examination ordered as part of a routine general medical examination 6/29/2012   • Left bundle branch block 11/14/2013   • Left inguinal hernia 3/14/2013    small fat containing   • Morbid Performed by Armani Benz MD at [facility] MAIN OR   • [address] Right 3/17/2016    Performed by Johnnie Delgado MD at [facility] MAIN OR   • EXPLORATORY LAPAROTOMY N/A 6/11/2018    Performed by Armani Benz MD at [facility] MAIN OR   • Gladys Less reviewed and is not pertinent to presenting problem.     Social History    Tobacco Use      Smoking status: Former Smoker        Packs/day: 1.00        Years: 12.00        Pack years: 15        Quit date: 1975        Years since quittin.4      Smok sprays of Flonase each nostril daily. Eyedrops as written. Antibiotic as written. Practice good hand hygiene.         Disposition and Plan     Clinical Impression:  Upper respiratory tract infection, unspecified type  (primary encounter diagnosis)  Acute

## 2019-06-02 NOTE — ED INITIAL ASSESSMENT (HPI)
Pt with c/o uri sx for the last 3 days. Pt now c/o left eye discomfort. Pt c/o drainage to the eye.   Pt taking dayquil/nyquil

## 2019-08-25 RX ORDER — MELOXICAM 15 MG/1
TABLET ORAL
Qty: 90 TABLET | Refills: 4 | Status: SHIPPED | OUTPATIENT
Start: 2019-08-25 | End: 2020-11-17

## 2020-02-24 ENCOUNTER — APPOINTMENT (OUTPATIENT)
Dept: LAB | Age: 75
End: 2020-02-24
Attending: FAMILY MEDICINE
Payer: MEDICARE

## 2020-02-24 DIAGNOSIS — K76.0 FATTY INFILTRATION OF LIVER: ICD-10-CM

## 2020-02-24 DIAGNOSIS — I10 ESSENTIAL HYPERTENSION: ICD-10-CM

## 2020-02-24 DIAGNOSIS — R73.03 PREDIABETES: ICD-10-CM

## 2020-02-24 LAB
ALBUMIN SERPL-MCNC: 3.6 G/DL (ref 3.4–5)
ALBUMIN/GLOB SERPL: 1.1 {RATIO} (ref 1–2)
ALP LIVER SERPL-CCNC: 48 U/L (ref 45–117)
ALT SERPL-CCNC: 21 U/L (ref 16–61)
ANION GAP SERPL CALC-SCNC: 5 MMOL/L (ref 0–18)
AST SERPL-CCNC: 11 U/L (ref 15–37)
BILIRUB SERPL-MCNC: 0.9 MG/DL (ref 0.1–2)
BUN BLD-MCNC: 23 MG/DL (ref 7–18)
BUN/CREAT SERPL: 27.1 (ref 10–20)
CALCIUM BLD-MCNC: 8.9 MG/DL (ref 8.5–10.1)
CHLORIDE SERPL-SCNC: 108 MMOL/L (ref 98–112)
CHOLEST SMN-MCNC: 124 MG/DL (ref ?–200)
CO2 SERPL-SCNC: 25 MMOL/L (ref 21–32)
CREAT BLD-MCNC: 0.85 MG/DL (ref 0.7–1.3)
EST. AVERAGE GLUCOSE BLD GHB EST-MCNC: 128 MG/DL (ref 68–126)
GLOBULIN PLAS-MCNC: 3.4 G/DL (ref 2.8–4.4)
GLUCOSE BLD-MCNC: 142 MG/DL (ref 70–99)
HBA1C MFR BLD HPLC: 6.1 % (ref ?–5.7)
HDLC SERPL-MCNC: 33 MG/DL (ref 40–59)
LDLC SERPL CALC-MCNC: 64 MG/DL (ref ?–100)
M PROTEIN MFR SERPL ELPH: 7 G/DL (ref 6.4–8.2)
NONHDLC SERPL-MCNC: 91 MG/DL (ref ?–130)
OSMOLALITY SERPL CALC.SUM OF ELEC: 292 MOSM/KG (ref 275–295)
PATIENT FASTING Y/N/NP: YES
PATIENT FASTING Y/N/NP: YES
POTASSIUM SERPL-SCNC: 4.3 MMOL/L (ref 3.5–5.1)
SODIUM SERPL-SCNC: 138 MMOL/L (ref 136–145)
TRIGL SERPL-MCNC: 135 MG/DL (ref 30–149)
VLDLC SERPL CALC-MCNC: 27 MG/DL (ref 0–30)

## 2020-02-24 PROCEDURE — 36415 COLL VENOUS BLD VENIPUNCTURE: CPT

## 2020-02-24 PROCEDURE — 80061 LIPID PANEL: CPT

## 2020-02-24 PROCEDURE — 83036 HEMOGLOBIN GLYCOSYLATED A1C: CPT

## 2020-02-24 PROCEDURE — 80053 COMPREHEN METABOLIC PANEL: CPT

## 2020-03-04 ENCOUNTER — OFFICE VISIT (OUTPATIENT)
Dept: FAMILY MEDICINE CLINIC | Facility: CLINIC | Age: 75
End: 2020-03-04
Payer: MEDICARE

## 2020-03-04 VITALS
TEMPERATURE: 98 F | HEIGHT: 69 IN | WEIGHT: 250 LBS | RESPIRATION RATE: 16 BRPM | DIASTOLIC BLOOD PRESSURE: 74 MMHG | BODY MASS INDEX: 37.03 KG/M2 | SYSTOLIC BLOOD PRESSURE: 128 MMHG | HEART RATE: 70 BPM

## 2020-03-04 DIAGNOSIS — Z12.5 ENCOUNTER FOR SCREENING FOR MALIGNANT NEOPLASM OF PROSTATE: ICD-10-CM

## 2020-03-04 DIAGNOSIS — I42.0 DILATED CARDIOMYOPATHY (HCC): ICD-10-CM

## 2020-03-04 DIAGNOSIS — I10 ESSENTIAL HYPERTENSION: ICD-10-CM

## 2020-03-04 DIAGNOSIS — G47.33 OSA ON CPAP: ICD-10-CM

## 2020-03-04 DIAGNOSIS — J34.89 RHINORRHEA: ICD-10-CM

## 2020-03-04 DIAGNOSIS — Z00.00 MEDICARE ANNUAL WELLNESS VISIT, SUBSEQUENT: Primary | ICD-10-CM

## 2020-03-04 DIAGNOSIS — E11.65 TYPE 2 DIABETES MELLITUS WITH HYPERGLYCEMIA, WITHOUT LONG-TERM CURRENT USE OF INSULIN (HCC): ICD-10-CM

## 2020-03-04 DIAGNOSIS — Z23 NEED FOR PNEUMOCOCCAL VACCINATION: ICD-10-CM

## 2020-03-04 DIAGNOSIS — Z23 ENCOUNTER FOR IMMUNIZATION: ICD-10-CM

## 2020-03-04 DIAGNOSIS — Z99.89 OSA ON CPAP: ICD-10-CM

## 2020-03-04 PROBLEM — S31.109A CHRONIC ABDOMINAL WOUND INFECTION, INITIAL ENCOUNTER: Status: RESOLVED | Noted: 2018-06-05 | Resolved: 2020-03-04

## 2020-03-04 PROBLEM — L08.9 CHRONIC ABDOMINAL WOUND INFECTION, INITIAL ENCOUNTER: Status: RESOLVED | Noted: 2018-06-05 | Resolved: 2020-03-04

## 2020-03-04 PROCEDURE — G0439 PPPS, SUBSEQ VISIT: HCPCS | Performed by: FAMILY MEDICINE

## 2020-03-04 PROCEDURE — G0009 ADMIN PNEUMOCOCCAL VACCINE: HCPCS | Performed by: FAMILY MEDICINE

## 2020-03-04 PROCEDURE — 90732 PPSV23 VACC 2 YRS+ SUBQ/IM: CPT | Performed by: FAMILY MEDICINE

## 2020-03-04 RX ORDER — FLUTICASONE PROPIONATE 50 MCG
2 SPRAY, SUSPENSION (ML) NASAL DAILY
Qty: 2 BOTTLE | Refills: 2 | Status: SHIPPED | OUTPATIENT
Start: 2020-03-04 | End: 2021-02-27

## 2020-03-06 NOTE — PROGRESS NOTES
Shantel Mayers. is a 76year old male who presents for a Medicare Annual Wellness visit.          Patient Care Team: Patient Care Team:  Jean Paul Mcgarry MD as PCP - General (Family Medicine)  Jean Paul Mcgarry MD as PCP - [facility]  Parish De La Cruz MD (CARDI In Vitro Strip by Other route.  Test once daily  3       Glucose and HbA1c Latest Ref Rng & Units 2/24/2020 3/8/2019 8/28/2018 6/22/2018 6/21/2018 6/20/2018 6/19/2018   Glucose 70 - 99 mg/dL 142(H) 116(H) 104(H) 113(H) 113(H) 115(H) 123(H)   Some recent alfredo Influenza, Hepatitis B, Tetanus, or Pneumococcal?: Yes     Functional Ability     Bathing or Showering: Able without help  Toileting: Able without help  Dressing: Able without help  Eating: Able without help  Driving: Able without help  Preparing your meal Preventative Physical Exam only, or if medically necessary Up to date. Colorectal Cancer Screening     Colonoscopy Screen every 10 years There are no preventive care reminders to display for this patient.     Flex Sigmoidoscopy Screen every 5 years No re reaction  Neomycin                    Comment:If used topically- rash  MEDICAL INFORMATION:   Past Medical History:   Diagnosis Date   • Abdominal pain 9/18/2013   • Abnormal chest sounds 2013   • Anxiety state, unspecified 6/29/2012   • Arthropathy, unspe endocrine, nutritional, metabolic, and immunity disorders 6/29/2012   • Sepsis ([facility] 11/14/2013   • Shortness of breath     ON EXERTION NOT REQUIRING O2   • Special screening for malignant neoplasm of prostate 6/29/2012   • Stricture of sigmoid colon ([facility] at [facility] MAIN OR   • ORTHOPEDIC SURG ([facility])      shattered right great toe-pins   • OTHER SURGICAL HISTORY      BILATERAL A  PMM    • OTHER SURGICAL HISTORY      TRIGGER FINGER RIGHT    • OTHER SURGICAL HISTORY      bowel resection   • OTHER SURGICAL HISTORY date: 1975        Years since quittin.2      Smokeless tobacco: Never Used    Alcohol use:  Yes      Alcohol/week: 1.0 standard drinks      Types: 1 Glasses of wine per week      Comment: social    Drug use: No         REVIEW OF SYSTEMS:   GENERAL: rales.   Chest:      Chest wall: No tenderness. Abdominal:      General: Bowel sounds are normal. There is no distension. Palpations: Abdomen is soft. There is no mass. Tenderness: There is no tenderness. There is no guarding or rebound.       H sprays by Each Nare route daily. Dispense: 2 Bottle; Refill: 2    9. Encounter for screening for malignant neoplasm of prostate   -stable, CPm  - PSA SCREEN; Future       The patient indicates understanding of these issues and agrees to the plan.   The edilberto

## 2020-03-09 ENCOUNTER — APPOINTMENT (OUTPATIENT)
Dept: LAB | Age: 75
End: 2020-03-09
Attending: FAMILY MEDICINE
Payer: MEDICARE

## 2020-03-09 DIAGNOSIS — Z00.00 MEDICARE ANNUAL WELLNESS VISIT, SUBSEQUENT: ICD-10-CM

## 2020-03-09 DIAGNOSIS — Z12.5 ENCOUNTER FOR SCREENING FOR MALIGNANT NEOPLASM OF PROSTATE: ICD-10-CM

## 2020-03-09 LAB — COMPLEXED PSA SERPL-MCNC: 1.02 NG/ML (ref ?–4)

## 2020-03-09 PROCEDURE — 36415 COLL VENOUS BLD VENIPUNCTURE: CPT

## 2020-06-17 ENCOUNTER — APPOINTMENT (OUTPATIENT)
Dept: GENERAL RADIOLOGY | Age: 75
End: 2020-06-17
Payer: MEDICARE

## 2020-06-17 ENCOUNTER — HOSPITAL ENCOUNTER (EMERGENCY)
Age: 75
Discharge: HOME | End: 2020-06-17
Attending: EMERGENCY MEDICINE
Payer: MEDICARE

## 2020-06-17 VITALS
BODY MASS INDEX: 35.79 KG/M2 | TEMPERATURE: 99 F | WEIGHT: 250 LBS | DIASTOLIC BLOOD PRESSURE: 56 MMHG | SYSTOLIC BLOOD PRESSURE: 117 MMHG | RESPIRATION RATE: 18 BRPM | OXYGEN SATURATION: 93 % | HEIGHT: 70 IN | HEART RATE: 82 BPM

## 2020-06-17 DIAGNOSIS — R07.89 CHEST WALL PAIN: Primary | ICD-10-CM

## 2020-06-17 PROCEDURE — 99283 EMERGENCY DEPT VISIT LOW MDM: CPT

## 2020-06-17 PROCEDURE — 71101 X-RAY EXAM UNILAT RIBS/CHEST: CPT | Performed by: EMERGENCY MEDICINE

## 2020-06-17 RX ORDER — ACETAMINOPHEN AND CODEINE PHOSPHATE 300; 30 MG/1; MG/1
1 TABLET ORAL EVERY 6 HOURS PRN
Qty: 10 TABLET | Refills: 0 | Status: SHIPPED | OUTPATIENT
Start: 2020-06-17 | End: 2020-06-24

## 2020-06-17 NOTE — ED PROVIDER NOTES
Patient Seen in: THE [facility] Emergency Department In [place]      History   Patient presents with:  Trauma    Stated Complaint: RIGHT RIB PAIN. DENIES LOC.       HPI    17-year-old male presents emergency room for evaluation of right-sided chest wall/rib p 11/14/2013   • Left inguinal hernia 3/14/2013    small fat containing   • Morbid obesity ([facility] 4/16/2013   • Osteoarthrosis, unspecified whether generalized or localized, unspecified site    • Other seborrheic keratosis 6/29/2012   • Perforated sigmoid col N/A 6/11/2018    Performed by Kosta Gabriel MD at [facility] MAIN OR   • EXPLORATORY LAPAROTOMY N/A 11/14/2013    Performed by Kosta Gabriel MD at [facility] MAIN OR   • HERNIA SURGERY      umbilical and right inguinal   • HERNIA SURGERY  2005    hernia repair   • HERNI quittin.4      Smokeless tobacco: Never Used    Alcohol use: Yes      Alcohol/week: 1.0 standard drinks      Types: 1 Glasses of wine per week      Comment: social    Drug use:  No             Review of Systems    Positive for stated complaint: RIGHT R cardiopulmonary process, no fractures no pneumothorax. I discussed all results with the patient and wife, patient does have exquisite tenderness to the rightlower  anterior chest wall/rib, discussed the possibility of nonvisualized rib fracture.   Patient

## 2020-11-17 RX ORDER — MELOXICAM 15 MG/1
TABLET ORAL
Qty: 90 TABLET | Refills: 3 | Status: SHIPPED | OUTPATIENT
Start: 2020-11-17 | End: 2021-11-15

## 2021-01-06 ENCOUNTER — TELEPHONE (OUTPATIENT)
Dept: FAMILY MEDICINE CLINIC | Facility: CLINIC | Age: 76
End: 2021-01-06

## 2021-01-06 NOTE — TELEPHONE ENCOUNTER
Patient requesting to have PSA drawn. Last PSA drawn on 3/9/2020 and it was normal. Ok to place lab order? Please advise. Thank you.

## 2021-01-06 NOTE — TELEPHONE ENCOUNTER
Spoke with the patient via phone. Notified the patient of the below response by the provider. Patient stated that he would rather wait until it has been a full year before having the test completed (does not want to have to pay out of pocket).  Patient stat

## 2021-01-06 NOTE — TELEPHONE ENCOUNTER
Pt is requesting a PSA test be placed along with any other labs he needs. Pt is doing lab work tomorrow after 720am visit.

## 2021-01-06 NOTE — TELEPHONE ENCOUNTER
[id number] for PSA order but please let him know insurance may not cover screening test if it has not been 1 full year.

## 2021-01-07 ENCOUNTER — LAB ENCOUNTER (OUTPATIENT)
Dept: LAB | Age: 76
End: 2021-01-07
Attending: NURSE PRACTITIONER
Payer: MEDICARE

## 2021-01-07 ENCOUNTER — OFFICE VISIT (OUTPATIENT)
Dept: FAMILY MEDICINE CLINIC | Facility: CLINIC | Age: 76
End: 2021-01-07
Payer: MEDICARE

## 2021-01-07 VITALS
OXYGEN SATURATION: 97 % | TEMPERATURE: 97 F | BODY MASS INDEX: 36.14 KG/M2 | DIASTOLIC BLOOD PRESSURE: 72 MMHG | WEIGHT: 244 LBS | HEART RATE: 87 BPM | RESPIRATION RATE: 18 BRPM | HEIGHT: 69 IN | SYSTOLIC BLOOD PRESSURE: 122 MMHG

## 2021-01-07 DIAGNOSIS — I42.0 DILATED CARDIOMYOPATHY (HCC): ICD-10-CM

## 2021-01-07 DIAGNOSIS — E11.65 TYPE 2 DIABETES MELLITUS WITH HYPERGLYCEMIA, WITHOUT LONG-TERM CURRENT USE OF INSULIN (HCC): ICD-10-CM

## 2021-01-07 DIAGNOSIS — H61.23 IMPACTED CERUMEN OF BOTH EARS: Primary | ICD-10-CM

## 2021-01-07 LAB
ALBUMIN SERPL-MCNC: 3.8 G/DL (ref 3.4–5)
ALBUMIN/GLOB SERPL: 1.2 {RATIO} (ref 1–2)
ALP LIVER SERPL-CCNC: 63 U/L
ALT SERPL-CCNC: 24 U/L
ANION GAP SERPL CALC-SCNC: 3 MMOL/L (ref 0–18)
AST SERPL-CCNC: 15 U/L (ref 15–37)
BASOPHILS # BLD AUTO: 0.04 X10(3) UL (ref 0–0.2)
BASOPHILS NFR BLD AUTO: 0.7 %
BILIRUB SERPL-MCNC: 0.7 MG/DL (ref 0.1–2)
BUN BLD-MCNC: 19 MG/DL (ref 7–18)
BUN/CREAT SERPL: 16.8 (ref 10–20)
CALCIUM BLD-MCNC: 9.1 MG/DL (ref 8.5–10.1)
CHLORIDE SERPL-SCNC: 109 MMOL/L (ref 98–112)
CHOLEST SMN-MCNC: 133 MG/DL (ref ?–200)
CO2 SERPL-SCNC: 27 MMOL/L (ref 21–32)
CREAT BLD-MCNC: 1.13 MG/DL
CREAT UR-SCNC: 208 MG/DL
DEPRECATED RDW RBC AUTO: 42.5 FL (ref 35.1–46.3)
EOSINOPHIL # BLD AUTO: 0.15 X10(3) UL (ref 0–0.7)
EOSINOPHIL NFR BLD AUTO: 2.5 %
ERYTHROCYTE [DISTWIDTH] IN BLOOD BY AUTOMATED COUNT: 13.1 % (ref 11–15)
EST. AVERAGE GLUCOSE BLD GHB EST-MCNC: 131 MG/DL (ref 68–126)
GLOBULIN PLAS-MCNC: 3.3 G/DL (ref 2.8–4.4)
GLUCOSE BLD-MCNC: 132 MG/DL (ref 70–99)
HBA1C MFR BLD HPLC: 6.2 % (ref ?–5.7)
HCT VFR BLD AUTO: 47.4 %
HDLC SERPL-MCNC: 38 MG/DL (ref 40–59)
HGB BLD-MCNC: 15.6 G/DL
IMM GRANULOCYTES # BLD AUTO: 0.01 X10(3) UL (ref 0–1)
IMM GRANULOCYTES NFR BLD: 0.2 %
LDLC SERPL CALC-MCNC: 74 MG/DL (ref ?–100)
LYMPHOCYTES # BLD AUTO: 1.41 X10(3) UL (ref 1–4)
LYMPHOCYTES NFR BLD AUTO: 23.6 %
M PROTEIN MFR SERPL ELPH: 7.1 G/DL (ref 6.4–8.2)
MCH RBC QN AUTO: 29.5 PG (ref 26–34)
MCHC RBC AUTO-ENTMCNC: 32.9 G/DL (ref 31–37)
MCV RBC AUTO: 89.6 FL
MICROALBUMIN UR-MCNC: 6.9 MG/DL
MICROALBUMIN/CREAT 24H UR-RTO: 33.2 UG/MG (ref ?–30)
MONOCYTES # BLD AUTO: 0.54 X10(3) UL (ref 0.1–1)
MONOCYTES NFR BLD AUTO: 9 %
NEUTROPHILS # BLD AUTO: 3.83 X10 (3) UL (ref 1.5–7.7)
NEUTROPHILS # BLD AUTO: 3.83 X10(3) UL (ref 1.5–7.7)
NEUTROPHILS NFR BLD AUTO: 64 %
NONHDLC SERPL-MCNC: 95 MG/DL (ref ?–130)
OSMOLALITY SERPL CALC.SUM OF ELEC: 292 MOSM/KG (ref 275–295)
PATIENT FASTING Y/N/NP: YES
PATIENT FASTING Y/N/NP: YES
PLATELET # BLD AUTO: 201 10(3)UL (ref 150–450)
POTASSIUM SERPL-SCNC: 4.4 MMOL/L (ref 3.5–5.1)
RBC # BLD AUTO: 5.29 X10(6)UL
SODIUM SERPL-SCNC: 139 MMOL/L (ref 136–145)
TRIGL SERPL-MCNC: 104 MG/DL (ref 30–149)
TSI SER-ACNC: 2.19 MIU/ML (ref 0.36–3.74)
VLDLC SERPL CALC-MCNC: 21 MG/DL (ref 0–30)
WBC # BLD AUTO: 6 X10(3) UL (ref 4–11)

## 2021-01-07 PROCEDURE — 82570 ASSAY OF URINE CREATININE: CPT

## 2021-01-07 PROCEDURE — 36415 COLL VENOUS BLD VENIPUNCTURE: CPT

## 2021-01-07 PROCEDURE — 85025 COMPLETE CBC W/AUTO DIFF WBC: CPT

## 2021-01-07 PROCEDURE — 80061 LIPID PANEL: CPT

## 2021-01-07 PROCEDURE — 82043 UR ALBUMIN QUANTITATIVE: CPT

## 2021-01-07 PROCEDURE — 99214 OFFICE O/P EST MOD 30 MIN: CPT | Performed by: NURSE PRACTITIONER

## 2021-01-07 PROCEDURE — 84443 ASSAY THYROID STIM HORMONE: CPT

## 2021-01-07 PROCEDURE — 80053 COMPREHEN METABOLIC PANEL: CPT

## 2021-01-07 PROCEDURE — 83036 HEMOGLOBIN GLYCOSYLATED A1C: CPT

## 2021-01-07 NOTE — PROGRESS NOTES
Chief Complaint:   Patient presents with:  Checkup: c/o ear infection right x3days     HPI:   This is a 76year old male presenting for evaluation of right ear pain x 3 days. Ear feels full. Has a popping sensation in the ear. Makes him dizzy at times.  [place] Overall Other seborrheic keratosis 6/29/2012   • Perforated sigmoid colon ([address] 11/14/2013   • Perforated viscus 11/14/2013   • Plantar wart 6/29/2012   • Pleural effusion 11/25/2013    small, w/ infiltrates atelectasis Lt > Rt lung bases   • Pneumonia, organism u inguinal   • HERNIA SURGERY  2005    hernia repair   • HERNIA SURGERY  1/2017    ventral hernia with mesh   • HERNIA VENTRAL REPAIR N/A 1/11/2017    Performed by Cristian Pat MD at [facility] N/A 6/12/2015    Performed by Erica Gómez History:  Family History   Problem Relation Age of Onset   • Other (Other) Father         water on the brain   • Diabetes Mother    • Diabetes Brother    • Diabetes Brother    • Hypertension Brother      Allergies:    Neosporin Original *    ISREAL Bowers pain.   Skin: Negative for pallor, rash and wound. Neurological: Positive for dizziness. Negative for speech difficulty, weakness, light-headedness, numbness and headaches.      EXAM:   /72   Pulse 87   Temp 96.9 °F (36.1 °C) (Temporal)   Resp 18 -Continue to monitor BS.   - OPHTHALMOLOGY - INTERNAL- Dr. Dejon Krueger    Due for AWV visit in March. Complete labs prior to that.      Duration of visit: 20 minutes

## 2021-01-27 DIAGNOSIS — Z23 NEED FOR VACCINATION: ICD-10-CM

## 2021-02-09 ENCOUNTER — IMMUNIZATION (OUTPATIENT)
Dept: LAB | Age: 76
End: 2021-02-09
Attending: HOSPITALIST
Payer: MEDICARE

## 2021-02-09 DIAGNOSIS — Z23 NEED FOR VACCINATION: Primary | ICD-10-CM

## 2021-02-09 PROCEDURE — 0001A SARSCOV2 VAC 30MCG/0.3ML IM: CPT

## 2021-03-01 ENCOUNTER — OFFICE VISIT (OUTPATIENT)
Dept: FAMILY MEDICINE CLINIC | Facility: CLINIC | Age: 76
End: 2021-03-01
Payer: MEDICARE

## 2021-03-01 VITALS
DIASTOLIC BLOOD PRESSURE: 72 MMHG | HEART RATE: 80 BPM | RESPIRATION RATE: 16 BRPM | TEMPERATURE: 97 F | SYSTOLIC BLOOD PRESSURE: 126 MMHG | HEIGHT: 69 IN | OXYGEN SATURATION: 98 % | BODY MASS INDEX: 37.62 KG/M2 | WEIGHT: 254 LBS

## 2021-03-01 DIAGNOSIS — Z00.00 MEDICARE ANNUAL WELLNESS VISIT, SUBSEQUENT: Primary | ICD-10-CM

## 2021-03-01 DIAGNOSIS — I10 ESSENTIAL HYPERTENSION: ICD-10-CM

## 2021-03-01 DIAGNOSIS — N40.1 BENIGN NON-NODULAR PROSTATIC HYPERPLASIA WITH LOWER URINARY TRACT SYMPTOMS: ICD-10-CM

## 2021-03-01 DIAGNOSIS — K76.0 FATTY INFILTRATION OF LIVER: ICD-10-CM

## 2021-03-01 DIAGNOSIS — N20.0 RECURRENT KIDNEY STONES: ICD-10-CM

## 2021-03-01 DIAGNOSIS — E11.65 TYPE 2 DIABETES MELLITUS WITH HYPERGLYCEMIA, WITHOUT LONG-TERM CURRENT USE OF INSULIN (HCC): ICD-10-CM

## 2021-03-01 DIAGNOSIS — I42.0 DILATED CARDIOMYOPATHY (HCC): ICD-10-CM

## 2021-03-01 DIAGNOSIS — Z99.89 OSA ON CPAP: ICD-10-CM

## 2021-03-01 DIAGNOSIS — G47.33 OSA ON CPAP: ICD-10-CM

## 2021-03-01 PROCEDURE — G0439 PPPS, SUBSEQ VISIT: HCPCS | Performed by: FAMILY MEDICINE

## 2021-03-02 ENCOUNTER — IMMUNIZATION (OUTPATIENT)
Dept: LAB | Age: 76
End: 2021-03-02
Attending: HOSPITALIST
Payer: MEDICARE

## 2021-03-02 DIAGNOSIS — Z23 NEED FOR VACCINATION: Primary | ICD-10-CM

## 2021-03-02 PROBLEM — L02.91 ABSCESS: Status: RESOLVED | Noted: 2018-06-11 | Resolved: 2021-03-02

## 2021-03-02 PROCEDURE — 0002A SARSCOV2 VAC 30MCG/0.3ML IM: CPT

## 2021-03-02 NOTE — PROGRESS NOTES
HPI:   Stevan Titus. is a 76year old male who presents for a Medicare Subsequent Annual Wellness visit (Pt already had Initial Annual Wellness).       Annual Physical due on 03/04/2021        Fall/Risk Assessment   He has been screened for Falls and lower leg     Arthropathy     Cardiomyopathy (HCC)     Left bundle branch block     HTN (hypertension)     Diverticulosis     Colon polyp     Hepatic cyst     Bilateral renal cysts     Fatty infiltration of liver     GIBRAN on CPAP     Recurrent kidney stones DAILY    •  Cholecalciferol (VITAMIN D3) 3000 units Oral Tab, Take 3,000 Units by mouth nightly. •  B Complex Vitamins (B COMPLEX OR), Take 1 tablet by mouth daily.       •  Acetaminophen ER (TYLENOL ARTHRITIS PAIN) 650 MG Oral Tab CR, Take 650 mg by marlyn (11/14/2013), Shortness of breath, Special screening for malignant neoplasm of prostate (6/29/2012), Stricture of sigmoid colon ([facility] (11/14/2013), Unspecified intestinal obstruction (10/24/2013), Unspecified sleep apnea, Ventral hernia, unspecified, witho incontinence  MUSCULOSKELETAL: denies back pain  NEURO: denies headaches  PSYCHE: denies depression or anxiety  HEMATOLOGIC: denies hx of anemia  ENDOCRINE: denies thyroid history  ALL/ASTHMA: denies hx of allergy or asthma    EXAM:   /72   Pulse 80 History   Administered Date(s) Administered   • Celestone Soluspan 3mg  05/23/2012, 02/27/2019   • Covid-19 Vaccine Pfizer 30 mcg/0.3 ml 02/09/2021, 03/02/2021   • Depo-Medrol 40mg Inj 09/05/2018, 02/06/2019   • FLU VAC High Dose 72 YRS & Older PRSV Free ( energy level?: Appropriate Exercise  How would you describe your current health state?: Good  How do you maintain positive mental well-being?: Social Interaction;Puzzles    This section provided for quick review of chart, separate sheet to patient  PREVENT Pneumococcal 23 (Pneumovax)  Covered Once after 65 03/04/2020 Please get once after your 65th birthday    Hepatitis B for Moderate/High Risk No vaccine history found Medium/high risk factors:   End-stage renal disease   Hemophiliacs who received Factor VII No flowsheet data found. Dilated Eye exam  Annually Data entered on: 9/20/2018   Last Dilated Eye Exam 9/20/2018     No flowsheet data found.

## 2021-03-09 NOTE — TELEPHONE ENCOUNTER
Medication(s) to Refill:   Requested Prescriptions     Pending Prescriptions Disp Refills   • MELOXICAM 15 MG Oral Tab [Pharmacy Med Name: MELOXICAM TABS 15MG] 90 tablet 0     Sig: TAKE 1 TABLET DAILY         Reason for Medication Refill being sent to [facility] Illumination Time: 00:16:40

## 2021-05-12 ENCOUNTER — PATIENT OUTREACH (OUTPATIENT)
Dept: CASE MANAGEMENT | Age: 76
End: 2021-05-12

## 2021-06-15 ENCOUNTER — HOSPITAL ENCOUNTER (EMERGENCY)
Age: 76
Discharge: HOME | End: 2021-06-15
Attending: EMERGENCY MEDICINE
Payer: MEDICARE

## 2021-06-15 VITALS
HEART RATE: 95 BPM | HEIGHT: 70 IN | DIASTOLIC BLOOD PRESSURE: 63 MMHG | WEIGHT: 248 LBS | OXYGEN SATURATION: 95 % | RESPIRATION RATE: 18 BRPM | TEMPERATURE: 98 F | BODY MASS INDEX: 35.5 KG/M2 | SYSTOLIC BLOOD PRESSURE: 124 MMHG

## 2021-06-15 DIAGNOSIS — S01.01XA LACERATION OF SCALP WITHOUT FOREIGN BODY, INITIAL ENCOUNTER: Primary | ICD-10-CM

## 2021-06-15 PROCEDURE — 99283 EMERGENCY DEPT VISIT LOW MDM: CPT

## 2021-06-15 PROCEDURE — 12001 RPR S/N/AX/GEN/TRNK 2.5CM/<: CPT

## 2021-06-15 PROCEDURE — 99282 EMERGENCY DEPT VISIT SF MDM: CPT

## 2021-06-15 NOTE — ED PROVIDER NOTES
Patient Seen in: THE [facility] Emergency Department In [place]      History   Patient presents with:  Laceration    Stated Complaint: HEAD LAC     HPI/Subjective:   42-year-old male presents to the ER with a 1 inch to the right side of the head.   P states he part of a routine general medical examination 6/29/2012   • Left bundle branch block 11/14/2013   • Left inguinal hernia 3/14/2013    small fat containing   • Morbid obesity ([facility] [address] 4/16/2013   • Osteoarthrosis, unspecified whether generalized or localized, SURGICAL HISTORY      TRIGGER FINGER RIGHT    • OTHER SURGICAL HISTORY      bowel resection   • OTHER SURGICAL HISTORY  11/14/2013    surgery for perforated viscus   • OTHER SURGICAL HISTORY  7/24/2013    colon resection with LAR of rectosigmoid colon and Other systems are as noted in HPI. Constitutional and vital signs reviewed. All other systems reviewed and negative except as noted above.     Physical Exam     ED Triage Vitals [06/15/21 1231]   /63   Pulse 95   Resp (!) 79   Temp 98.3 °F (36.8 bodies and none were found. The edges were reapproximated using 2 staples. Bleeding was well-controlled. The patient tolerated the procedure well.                 MDM      I have discussed with the patient and/or family the results of test, differential girish

## 2021-06-25 ENCOUNTER — HOSPITAL ENCOUNTER (EMERGENCY)
Age: 76
Discharge: HOME | End: 2021-06-25
Payer: MEDICARE

## 2021-06-25 VITALS
WEIGHT: 246 LBS | BODY MASS INDEX: 35.22 KG/M2 | SYSTOLIC BLOOD PRESSURE: 133 MMHG | TEMPERATURE: 99 F | HEIGHT: 70 IN | HEART RATE: 85 BPM | RESPIRATION RATE: 16 BRPM | DIASTOLIC BLOOD PRESSURE: 62 MMHG | OXYGEN SATURATION: 99 %

## 2021-06-25 DIAGNOSIS — Z48.02 ENCOUNTER FOR STAPLE REMOVAL: Primary | ICD-10-CM

## 2021-06-25 DIAGNOSIS — S01.01XD SCALP LACERATION, SUBSEQUENT ENCOUNTER: ICD-10-CM

## 2021-06-25 NOTE — ED PROVIDER NOTES
Patient Seen in: [facility] Emergency Department In [place]      History   Patient presents with:  Gama Ames    Stated Complaint: here for staple removal    HPI/Subjective:   HPI    CHIEF COMPLAINT: Here for staple removal     HISTORY OF ROB 7/24/2013   • Diverticulosis 5/2/2013   • Dizziness and giddiness 6/29/2012   • Dyspnea    • Fatty infiltration of liver 3/14/2013    mild-no recent symptoms   • Glucose intolerance (pre-diabetes)    • Hepatic cyst 3/14/2013    Lt hepatic cysts   • History • COLONOSCOPY  5/2/2013, 10/1/2013, 2/1/2013 5/2/2013 colonoscopy w/ snare polypectomy, 10/1/2013 colonoscopy w/ dilatation of anastomotic stricture   • COLONOSCOPY     • HERNIA SURGERY      umbilical and right inguinal   • HERNIA SURGERY  2005    her date: 1975        Years since quittin.5      Smokeless tobacco: Never Used    Alcohol use: Yes      Alcohol/week: 1.0 standard drinks      Types: 1 Glasses of wine per week      Comment: social    Drug use:  No             Review of Systems    [place] [id number]  [phone]      As needed          Medications Prescribed:  Discharge Medication List as of 6/25/2021 11:28 AM

## 2021-07-19 ENCOUNTER — LAB ENCOUNTER (OUTPATIENT)
Dept: LAB | Age: 76
End: 2021-07-19
Attending: FAMILY MEDICINE
Payer: MEDICARE

## 2021-07-19 DIAGNOSIS — K76.0 FATTY INFILTRATION OF LIVER: ICD-10-CM

## 2021-07-19 DIAGNOSIS — E11.65 TYPE 2 DIABETES MELLITUS WITH HYPERGLYCEMIA, WITHOUT LONG-TERM CURRENT USE OF INSULIN (HCC): ICD-10-CM

## 2021-07-19 LAB
ALBUMIN SERPL-MCNC: 3.7 G/DL (ref 3.4–5)
ALBUMIN/GLOB SERPL: 1.2 {RATIO} (ref 1–2)
ALP LIVER SERPL-CCNC: 56 U/L
ALT SERPL-CCNC: 22 U/L
ANION GAP SERPL CALC-SCNC: 6 MMOL/L (ref 0–18)
AST SERPL-CCNC: 13 U/L (ref 15–37)
BASOPHILS # BLD AUTO: 0.06 X10(3) UL (ref 0–0.2)
BASOPHILS NFR BLD AUTO: 1 %
BILIRUB SERPL-MCNC: 1 MG/DL (ref 0.1–2)
BUN BLD-MCNC: 21 MG/DL (ref 7–18)
BUN/CREAT SERPL: 23.1 (ref 10–20)
CALCIUM BLD-MCNC: 8.8 MG/DL (ref 8.5–10.1)
CHLORIDE SERPL-SCNC: 107 MMOL/L (ref 98–112)
CHOLEST SMN-MCNC: 131 MG/DL (ref ?–200)
CO2 SERPL-SCNC: 23 MMOL/L (ref 21–32)
CREAT BLD-MCNC: 0.91 MG/DL
DEPRECATED RDW RBC AUTO: 43.5 FL (ref 35.1–46.3)
EOSINOPHIL # BLD AUTO: 0.18 X10(3) UL (ref 0–0.7)
EOSINOPHIL NFR BLD AUTO: 3.1 %
ERYTHROCYTE [DISTWIDTH] IN BLOOD BY AUTOMATED COUNT: 13.2 % (ref 11–15)
EST. AVERAGE GLUCOSE BLD GHB EST-MCNC: 137 MG/DL (ref 68–126)
GLOBULIN PLAS-MCNC: 3.2 G/DL (ref 2.8–4.4)
GLUCOSE BLD-MCNC: 146 MG/DL (ref 70–99)
HBA1C MFR BLD HPLC: 6.4 % (ref ?–5.7)
HCT VFR BLD AUTO: 46.8 %
HDLC SERPL-MCNC: 34 MG/DL (ref 40–59)
HGB BLD-MCNC: 15.4 G/DL
IMM GRANULOCYTES # BLD AUTO: 0.01 X10(3) UL (ref 0–1)
IMM GRANULOCYTES NFR BLD: 0.2 %
LDLC SERPL CALC-MCNC: 72 MG/DL (ref ?–100)
LYMPHOCYTES # BLD AUTO: 1.31 X10(3) UL (ref 1–4)
LYMPHOCYTES NFR BLD AUTO: 22.5 %
M PROTEIN MFR SERPL ELPH: 6.9 G/DL (ref 6.4–8.2)
MCH RBC QN AUTO: 29.8 PG (ref 26–34)
MCHC RBC AUTO-ENTMCNC: 32.9 G/DL (ref 31–37)
MCV RBC AUTO: 90.7 FL
MONOCYTES # BLD AUTO: 0.46 X10(3) UL (ref 0.1–1)
MONOCYTES NFR BLD AUTO: 7.9 %
NEUTROPHILS # BLD AUTO: 3.81 X10 (3) UL (ref 1.5–7.7)
NEUTROPHILS # BLD AUTO: 3.81 X10(3) UL (ref 1.5–7.7)
NEUTROPHILS NFR BLD AUTO: 65.3 %
NONHDLC SERPL-MCNC: 97 MG/DL (ref ?–130)
OSMOLALITY SERPL CALC.SUM OF ELEC: 288 MOSM/KG (ref 275–295)
PATIENT FASTING Y/N/NP: YES
PATIENT FASTING Y/N/NP: YES
PLATELET # BLD AUTO: 200 10(3)UL (ref 150–450)
POTASSIUM SERPL-SCNC: 4.4 MMOL/L (ref 3.5–5.1)
RBC # BLD AUTO: 5.16 X10(6)UL
SODIUM SERPL-SCNC: 136 MMOL/L (ref 136–145)
TRIGL SERPL-MCNC: 141 MG/DL (ref 30–149)
TSI SER-ACNC: 1.01 MIU/ML (ref 0.36–3.74)
VLDLC SERPL CALC-MCNC: 22 MG/DL (ref 0–30)
WBC # BLD AUTO: 5.8 X10(3) UL (ref 4–11)

## 2021-07-19 PROCEDURE — 36415 COLL VENOUS BLD VENIPUNCTURE: CPT

## 2021-07-19 PROCEDURE — 80061 LIPID PANEL: CPT

## 2021-07-19 PROCEDURE — 80053 COMPREHEN METABOLIC PANEL: CPT

## 2021-07-19 PROCEDURE — 84443 ASSAY THYROID STIM HORMONE: CPT

## 2021-07-19 PROCEDURE — 85025 COMPLETE CBC W/AUTO DIFF WBC: CPT

## 2021-07-19 PROCEDURE — 83036 HEMOGLOBIN GLYCOSYLATED A1C: CPT

## 2021-07-20 ENCOUNTER — TELEPHONE (OUTPATIENT)
Dept: FAMILY MEDICINE CLINIC | Facility: CLINIC | Age: 76
End: 2021-07-20

## 2021-07-20 DIAGNOSIS — E11.65 TYPE 2 DIABETES MELLITUS WITH HYPERGLYCEMIA, WITHOUT LONG-TERM CURRENT USE OF INSULIN (HCC): Primary | ICD-10-CM

## 2021-07-20 NOTE — TELEPHONE ENCOUNTER
----- Message from LUPIS Patrick FNP-C sent at 7/20/2021  2:41 PM CDT -----  Labs are stable. Repeat DM labs in 6 months.

## 2021-08-13 ENCOUNTER — OFFICE VISIT (OUTPATIENT)
Dept: FAMILY MEDICINE CLINIC | Facility: CLINIC | Age: 76
End: 2021-08-13
Payer: MEDICARE

## 2021-08-13 VITALS
OXYGEN SATURATION: 99 % | SYSTOLIC BLOOD PRESSURE: 131 MMHG | HEIGHT: 70 IN | DIASTOLIC BLOOD PRESSURE: 79 MMHG | WEIGHT: 245 LBS | RESPIRATION RATE: 16 BRPM | BODY MASS INDEX: 35.07 KG/M2 | TEMPERATURE: 98 F | HEART RATE: 72 BPM

## 2021-08-13 DIAGNOSIS — L23.7 POISON IVY DERMATITIS: Primary | ICD-10-CM

## 2021-08-13 PROCEDURE — 99213 OFFICE O/P EST LOW 20 MIN: CPT | Performed by: NURSE PRACTITIONER

## 2021-08-13 RX ORDER — PREDNISONE 20 MG/1
TABLET ORAL
Qty: 21 TABLET | Refills: 0 | Status: SHIPPED | OUTPATIENT
Start: 2021-08-13 | End: 2021-09-13 | Stop reason: ALTCHOICE

## 2021-08-13 NOTE — PROGRESS NOTES
HPI/Subjective:   Patient ID: Catina Prado. is a 76year old male. Patient was tying up tomato plants in the garden and notes itching, blistering and redness to his wrists/arms worsening. Rash  This is a new problem.  Episode onset: in the pas Temp 98.2 °F (36.8 °C) (Temporal)   Resp 16   Ht 5' 10\" (1.778 m)   Wt 245 lb (111.1 kg)   SpO2 99%   BMI 35.15 kg/m²       Objective:   Physical Exam  Constitutional:       General: He is not in acute distress. Appearance: Normal appearance.  He is tonight. You may use either calamine lotion to the area or you can use a paste of baking soda and water to help with itching. · Follow up with Dr. Anaya Ang office if symptoms worsen or persist longer than 10 days.       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 If you have a severe rash, your itching may worsen or your rash may blister and ooze. If this happens, seek medical care. The fluid from your blisters will not make your rash spread. With or without medical care, your rash may last up to 3 weeks.  In

## 2021-08-13 NOTE — PATIENT INSTRUCTIONS
· Take Prednisone 40 mg by mouth days 1, 2, 3 then take Prednisone 20 mg by mouth days 4, 5, 6, then take Prednisone 10 mg by mouth days 7,8,9,10. Take EARLY in day to prevent restlessness. · You may use a cold compress to the arm where itchy.  Tonight ezequiel taken by mouth. These include diphenhydramine. You can buy these at the grocery store or pharmacy. Talk to your healthcare provider or pharmacist for more information on oral antihistamines. · Use over-the-counter treatments on your skin.   These include

## 2021-08-22 ENCOUNTER — OFFICE VISIT (OUTPATIENT)
Dept: FAMILY MEDICINE CLINIC | Facility: CLINIC | Age: 76
End: 2021-08-22
Payer: MEDICARE

## 2021-08-22 VITALS
TEMPERATURE: 99 F | HEART RATE: 78 BPM | HEIGHT: 70 IN | DIASTOLIC BLOOD PRESSURE: 60 MMHG | BODY MASS INDEX: 35.07 KG/M2 | SYSTOLIC BLOOD PRESSURE: 134 MMHG | WEIGHT: 245 LBS | RESPIRATION RATE: 16 BRPM | OXYGEN SATURATION: 96 %

## 2021-08-22 DIAGNOSIS — L23.7 POISON IVY DERMATITIS: Primary | ICD-10-CM

## 2021-08-22 PROCEDURE — 99213 OFFICE O/P EST LOW 20 MIN: CPT | Performed by: NURSE PRACTITIONER

## 2021-08-22 RX ORDER — PREDNISONE 10 MG/1
TABLET ORAL
Qty: 45 TABLET | Refills: 0 | Status: SHIPPED | OUTPATIENT
Start: 2021-08-22 | End: 2021-09-13 | Stop reason: ALTCHOICE

## 2021-08-22 NOTE — PROGRESS NOTES
CHIEF COMPLAINT:   Patient presents with:  Rash: l arm, right arm         HPI:    Serenity Tan. is a 76year old male who presents for evaluation of a rash. Per patient rash started in the past 2  weeks.  Rash has been slightly better, but then wors unspecified 6/29/2012   • Benign neoplasm of colon 5/28/2013    large, adenomatous polyp   • Bilateral renal cysts 3/14/2013   • Candidiasis of skin 10/24/2013   • Cardiomyopathy (HCC)     nonischemic LVEF 45-50%   • Cellulitis and abscess of face 6/29/201 • Unspecified intestinal obstruction 10/24/2013    colonic stricture   • Unspecified sleep apnea     AHI 33, sao2 76% CPAP 15    • Ventral hernia, unspecified, without mention of obstruction or gangrene 6/29/2012   • Visual impairment     glasses   • [facility] MAIN OR   • VENTRAL HERNIA REPAIR N/A 6/12/2015    Procedure: HERNIA VENTRAL REPAIR;  Surgeon: Peyton Rhodes MD;  Location: [address] [place] [address] MAIN OR   • VENTRAL HERNIA REPAIR N/A 1/11/2017    Procedure:  HERNIA VENTRAL REPAIR;  Surgeon: Peyton Rhodes MD;  Location: [address] [place] [address] presents for evaluation of a rash. Findings are consistent with:    ASSESSMENT:  Poison ivy dermatitis  (primary encounter diagnosis)    PLAN: Will give a longer course of tapering steroids. Meds as listed below.   Comfort measures as described in Patient

## 2021-09-17 ENCOUNTER — LAB ENCOUNTER (OUTPATIENT)
Dept: LAB | Age: 76
End: 2021-09-17
Attending: FAMILY MEDICINE
Payer: MEDICARE

## 2021-09-17 ENCOUNTER — OFFICE VISIT (OUTPATIENT)
Dept: FAMILY MEDICINE CLINIC | Facility: CLINIC | Age: 76
End: 2021-09-17
Payer: MEDICARE

## 2021-09-17 VITALS
HEART RATE: 90 BPM | BODY MASS INDEX: 35.79 KG/M2 | WEIGHT: 250 LBS | OXYGEN SATURATION: 98 % | HEIGHT: 70 IN | RESPIRATION RATE: 16 BRPM | DIASTOLIC BLOOD PRESSURE: 64 MMHG | SYSTOLIC BLOOD PRESSURE: 120 MMHG

## 2021-09-17 DIAGNOSIS — R35.89 POLYURIA: ICD-10-CM

## 2021-09-17 DIAGNOSIS — Z85.46 PERSONAL HISTORY OF MALIGNANT NEOPLASM OF PROSTATE: ICD-10-CM

## 2021-09-17 DIAGNOSIS — R25.2 LEG CRAMPS: ICD-10-CM

## 2021-09-17 DIAGNOSIS — M17.0 BILATERAL PRIMARY OSTEOARTHRITIS OF KNEE: Primary | ICD-10-CM

## 2021-09-17 DIAGNOSIS — E11.65 TYPE 2 DIABETES MELLITUS WITH HYPERGLYCEMIA, WITHOUT LONG-TERM CURRENT USE OF INSULIN (HCC): ICD-10-CM

## 2021-09-17 DIAGNOSIS — M12.9 ARTHROPATHY: ICD-10-CM

## 2021-09-17 DIAGNOSIS — I10 PRIMARY HYPERTENSION: ICD-10-CM

## 2021-09-17 DIAGNOSIS — J42 CHRONIC BRONCHITIS, UNSPECIFIED CHRONIC BRONCHITIS TYPE (HCC): ICD-10-CM

## 2021-09-17 DIAGNOSIS — M75.41 IMPINGEMENT SYNDROME OF RIGHT SHOULDER: ICD-10-CM

## 2021-09-17 LAB
ALBUMIN SERPL-MCNC: 3.6 G/DL (ref 3.4–5)
ALBUMIN/GLOB SERPL: 1.2 {RATIO} (ref 1–2)
ALP LIVER SERPL-CCNC: 50 U/L
ALT SERPL-CCNC: 27 U/L
ANION GAP SERPL CALC-SCNC: 4 MMOL/L (ref 0–18)
AST SERPL-CCNC: 14 U/L (ref 15–37)
BASOPHILS # BLD AUTO: 0.04 X10(3) UL (ref 0–0.2)
BASOPHILS NFR BLD AUTO: 0.6 %
BILIRUB SERPL-MCNC: 0.8 MG/DL (ref 0.1–2)
BUN BLD-MCNC: 20 MG/DL (ref 7–18)
CALCIUM BLD-MCNC: 8.6 MG/DL (ref 8.5–10.1)
CHLORIDE SERPL-SCNC: 108 MMOL/L (ref 98–112)
CO2 SERPL-SCNC: 26 MMOL/L (ref 21–32)
CREAT BLD-MCNC: 0.81 MG/DL
EOSINOPHIL # BLD AUTO: 0.13 X10(3) UL (ref 0–0.7)
EOSINOPHIL NFR BLD AUTO: 2.1 %
ERYTHROCYTE [DISTWIDTH] IN BLOOD BY AUTOMATED COUNT: 13.1 %
GLOBULIN PLAS-MCNC: 3.1 G/DL (ref 2.8–4.4)
GLUCOSE BLD-MCNC: 143 MG/DL (ref 70–99)
HCT VFR BLD AUTO: 43.8 %
HGB BLD-MCNC: 14.5 G/DL
IMM GRANULOCYTES # BLD AUTO: 0.01 X10(3) UL (ref 0–1)
IMM GRANULOCYTES NFR BLD: 0.2 %
LYMPHOCYTES # BLD AUTO: 1.36 X10(3) UL (ref 1–4)
LYMPHOCYTES NFR BLD AUTO: 21.8 %
MAGNESIUM SERPL-MCNC: 2 MG/DL (ref 1.6–2.6)
MCH RBC QN AUTO: 29.8 PG (ref 26–34)
MCHC RBC AUTO-ENTMCNC: 33.1 G/DL (ref 31–37)
MCV RBC AUTO: 90.1 FL
MONOCYTES # BLD AUTO: 0.59 X10(3) UL (ref 0.1–1)
MONOCYTES NFR BLD AUTO: 9.5 %
NEUTROPHILS # BLD AUTO: 4.11 X10 (3) UL (ref 1.5–7.7)
NEUTROPHILS # BLD AUTO: 4.11 X10(3) UL (ref 1.5–7.7)
NEUTROPHILS NFR BLD AUTO: 65.8 %
OSMOLALITY SERPL CALC.SUM OF ELEC: 291 MOSM/KG (ref 275–295)
PATIENT FASTING Y/N/NP: YES
PLATELET # BLD AUTO: 183 10(3)UL (ref 150–450)
POTASSIUM SERPL-SCNC: 4.5 MMOL/L (ref 3.5–5.1)
PROT SERPL-MCNC: 6.7 G/DL (ref 6.4–8.2)
PSA SERPL-MCNC: 1.6 NG/ML (ref ?–4)
RBC # BLD AUTO: 4.86 X10(6)UL
SODIUM SERPL-SCNC: 138 MMOL/L (ref 136–145)
WBC # BLD AUTO: 6.2 X10(3) UL (ref 4–11)

## 2021-09-17 PROCEDURE — 99215 OFFICE O/P EST HI 40 MIN: CPT | Performed by: FAMILY MEDICINE

## 2021-09-17 PROCEDURE — 80053 COMPREHEN METABOLIC PANEL: CPT

## 2021-09-17 PROCEDURE — 84153 ASSAY OF PSA TOTAL: CPT

## 2021-09-17 PROCEDURE — 83735 ASSAY OF MAGNESIUM: CPT

## 2021-09-17 PROCEDURE — 85025 COMPLETE CBC W/AUTO DIFF WBC: CPT

## 2021-09-17 PROCEDURE — 36415 COLL VENOUS BLD VENIPUNCTURE: CPT

## 2021-09-20 ENCOUNTER — TELEPHONE (OUTPATIENT)
Dept: FAMILY MEDICINE CLINIC | Facility: CLINIC | Age: 76
End: 2021-09-20

## 2021-09-20 DIAGNOSIS — E11.65 TYPE 2 DIABETES MELLITUS WITH HYPERGLYCEMIA, WITHOUT LONG-TERM CURRENT USE OF INSULIN (HCC): ICD-10-CM

## 2021-09-20 DIAGNOSIS — I10 PRIMARY HYPERTENSION: Primary | ICD-10-CM

## 2021-09-20 NOTE — PROGRESS NOTES
No chief complaint on file. Serenity Tan. is a 76year old year old who presents for recheck of diabetes. Pt has been checking feet on a regular basis. Pt denies any tingling of the feet. Pt denies any sx's of depression.    Patient presents fo Weight (enc vitals) - 250 lb 245 lb 245 lb 245 lb 251 lb - 246 lb   BP (enc vitals) - 120/64 124/72 134/60 131/79 134/70 - 133/62   Last Foot Exam - - - - - - - -   Last Eye Exam - - - - - - - -   PHQ2 Score - - - - - - - -   PHQ2 Score - - - - - - - - ALT (SGPT) (IU/L)   Date Value   09/04/2013 8     ALT (U/L)   Date Value   09/17/2021 27   07/19/2021 22   01/07/2021 24   02/03/2016 28   05/12/2014 17   02/24/2014 16 (L)   11/16/2013 9 (L)   01/31/2011 13        reports that he quit smoking about 46 hyperplasia with lower urinary tract symptoms     Rotator cuff tendinitis, right     Osteoarthritis of left glenohumeral joint     Chronic right shoulder pain     Decreased ROM of left shoulder     Left leg pain     Impingement syndrome of right shoulder giddiness 6/29/2012   • Dyspnea    • Fatty infiltration of liver 3/14/2013    mild-no recent symptoms   • Glucose intolerance (pre-diabetes)    • Hepatic cyst 3/14/2013    Lt hepatic cysts   • History of blood transfusion 2001   • HTN (hypertension) 11/16/ colonoscopy w/ snare polypectomy, 10/1/2013 colonoscopy w/ dilatation of anastomotic stricture   • COLONOSCOPY     • HERNIA SURGERY      umbilical and right inguinal   • HERNIA SURGERY  2005    hernia repair   • HERNIA SURGERY  1/2017    ventral hernia wit Tobacco Use      Smoking status: Former Smoker        Packs/day: 1.00        Years: 12.00        Pack years: 15        Quit date: 1975        Years since quittin.7      Smokeless tobacco: Never Used    Alcohol use:  Yes      Alcohol/week: 1.0 stand knee  -stable, CPM    2. Type 2 diabetes mellitus with hyperglycemia, without long-term current use of insulin (HCC)  -stable, due for labs. 3. Chronic bronchitis, unspecified chronic bronchitis type (HCC)  -stable, CPM    4.  Arthropathy  -stable, medi

## 2021-09-20 NOTE — TELEPHONE ENCOUNTER
----- Message from Anum Hanley MD sent at 9/20/2021  1:28 PM CDT -----  Normal labs, needs diabetic labs every 3-6 months.

## 2021-11-15 RX ORDER — MELOXICAM 15 MG/1
TABLET ORAL
Qty: 90 TABLET | Refills: 3 | Status: SHIPPED | OUTPATIENT
Start: 2021-11-15

## 2022-01-13 ENCOUNTER — HOSPITAL ENCOUNTER (EMERGENCY)
Age: 77
Discharge: HOME | End: 2022-01-13
Attending: EMERGENCY MEDICINE
Payer: MEDICARE

## 2022-01-13 ENCOUNTER — TELEPHONE (OUTPATIENT)
Dept: FAMILY MEDICINE CLINIC | Facility: CLINIC | Age: 77
End: 2022-01-13

## 2022-01-13 VITALS
WEIGHT: 250 LBS | OXYGEN SATURATION: 95 % | RESPIRATION RATE: 16 BRPM | SYSTOLIC BLOOD PRESSURE: 114 MMHG | BODY MASS INDEX: 35.79 KG/M2 | TEMPERATURE: 98 F | DIASTOLIC BLOOD PRESSURE: 65 MMHG | HEART RATE: 74 BPM | HEIGHT: 70 IN

## 2022-01-13 DIAGNOSIS — L03.116 CELLULITIS OF LEFT LOWER EXTREMITY: Primary | ICD-10-CM

## 2022-01-13 LAB
ALBUMIN SERPL-MCNC: 3.5 G/DL (ref 3.4–5)
ALBUMIN/GLOB SERPL: 1.1 {RATIO} (ref 1–2)
ALP LIVER SERPL-CCNC: 61 U/L
ALT SERPL-CCNC: 20 U/L
ANION GAP SERPL CALC-SCNC: 4 MMOL/L (ref 0–18)
AST SERPL-CCNC: 11 U/L (ref 15–37)
BASOPHILS # BLD AUTO: 0.06 X10(3) UL (ref 0–0.2)
BASOPHILS NFR BLD AUTO: 0.9 %
BILIRUB SERPL-MCNC: 0.6 MG/DL (ref 0.1–2)
BUN BLD-MCNC: 17 MG/DL (ref 7–18)
CALCIUM BLD-MCNC: 8.6 MG/DL (ref 8.5–10.1)
CHLORIDE SERPL-SCNC: 109 MMOL/L (ref 98–112)
CO2 SERPL-SCNC: 26 MMOL/L (ref 21–32)
CREAT BLD-MCNC: 0.8 MG/DL
EOSINOPHIL # BLD AUTO: 0.29 X10(3) UL (ref 0–0.7)
EOSINOPHIL NFR BLD AUTO: 4.3 %
ERYTHROCYTE [DISTWIDTH] IN BLOOD BY AUTOMATED COUNT: 12.8 %
GLOBULIN PLAS-MCNC: 3.2 G/DL (ref 2.8–4.4)
GLUCOSE BLD-MCNC: 106 MG/DL (ref 70–99)
HCT VFR BLD AUTO: 44.6 %
HGB BLD-MCNC: 15.4 G/DL
IMM GRANULOCYTES # BLD AUTO: 0.01 X10(3) UL (ref 0–1)
IMM GRANULOCYTES NFR BLD: 0.1 %
LYMPHOCYTES # BLD AUTO: 1.56 X10(3) UL (ref 1–4)
LYMPHOCYTES NFR BLD AUTO: 23.1 %
MCH RBC QN AUTO: 30.4 PG (ref 26–34)
MCHC RBC AUTO-ENTMCNC: 34.5 G/DL (ref 31–37)
MCV RBC AUTO: 88 FL
MONOCYTES # BLD AUTO: 0.55 X10(3) UL (ref 0.1–1)
MONOCYTES NFR BLD AUTO: 8.1 %
NEUTROPHILS # BLD AUTO: 4.28 X10 (3) UL (ref 1.5–7.7)
NEUTROPHILS # BLD AUTO: 4.28 X10(3) UL (ref 1.5–7.7)
NEUTROPHILS NFR BLD AUTO: 63.5 %
OSMOLALITY SERPL CALC.SUM OF ELEC: 290 MOSM/KG (ref 275–295)
PLATELET # BLD AUTO: 196 10(3)UL (ref 150–450)
POTASSIUM SERPL-SCNC: 4.1 MMOL/L (ref 3.5–5.1)
PROT SERPL-MCNC: 6.7 G/DL (ref 6.4–8.2)
RBC # BLD AUTO: 5.07 X10(6)UL
SODIUM SERPL-SCNC: 139 MMOL/L (ref 136–145)
WBC # BLD AUTO: 6.8 X10(3) UL (ref 4–11)

## 2022-01-13 PROCEDURE — 87186 SC STD MICRODIL/AGAR DIL: CPT | Performed by: EMERGENCY MEDICINE

## 2022-01-13 PROCEDURE — 85025 COMPLETE CBC W/AUTO DIFF WBC: CPT | Performed by: EMERGENCY MEDICINE

## 2022-01-13 PROCEDURE — 99284 EMERGENCY DEPT VISIT MOD MDM: CPT

## 2022-01-13 PROCEDURE — 87070 CULTURE OTHR SPECIMN AEROBIC: CPT | Performed by: EMERGENCY MEDICINE

## 2022-01-13 PROCEDURE — 80053 COMPREHEN METABOLIC PANEL: CPT | Performed by: EMERGENCY MEDICINE

## 2022-01-13 PROCEDURE — 87077 CULTURE AEROBIC IDENTIFY: CPT | Performed by: EMERGENCY MEDICINE

## 2022-01-13 PROCEDURE — 96365 THER/PROPH/DIAG IV INF INIT: CPT

## 2022-01-13 PROCEDURE — S0077 INJECTION, CLINDAMYCIN PHOSP: HCPCS | Performed by: EMERGENCY MEDICINE

## 2022-01-13 PROCEDURE — 87205 SMEAR GRAM STAIN: CPT | Performed by: EMERGENCY MEDICINE

## 2022-01-13 RX ORDER — CLINDAMYCIN PHOSPHATE 600 MG/50ML
600 INJECTION INTRAVENOUS ONCE
Status: COMPLETED | OUTPATIENT
Start: 2022-01-13 | End: 2022-01-13

## 2022-01-13 RX ORDER — CLINDAMYCIN HYDROCHLORIDE 300 MG/1
300 CAPSULE ORAL 3 TIMES DAILY
Qty: 30 CAPSULE | Refills: 0 | Status: SHIPPED | OUTPATIENT
Start: 2022-01-13 | End: 2022-01-23

## 2022-01-13 NOTE — TELEPHONE ENCOUNTER
Patient declined video visit, he is scheduled for Saturday     He has a left leg rash behind knee and calf, leaking fluid, 3 inches around, bumps, red, it burns.  It started out as a quarter size     Just wanted to send this just in case he needs to go to I

## 2022-01-13 NOTE — TELEPHONE ENCOUNTER
Patient called back and was thinking of going to immediate care, told him message was sent to  and someone will call him back shortly

## 2022-01-13 NOTE — TELEPHONE ENCOUNTER
Spoke with patient and he states that feels as if the drainage has increased. I told him message out to Dr Omi Hernandez. Though he has an appointment on Saturday, he will seek care in the ER today.

## 2022-01-13 NOTE — TELEPHONE ENCOUNTER
Spoke to patient, he will go to ER since he is nervous about drainage and does not want to wait until Saturday to see MD.

## 2022-01-13 NOTE — ED PROVIDER NOTES
Patient Seen in: THE [facility] Emergency Department In [place]      History   Patient presents with:  Leg Pain: Pt state that his leg is red and wheeping    Stated Complaint: possible leg infection    Subjective:   HPI     Patient presents with a possible le nonobstructing stones Rt lower pole kidney   • Laboratory examination ordered as part of a routine general medical examination 6/29/2012   • Left bundle branch block 11/14/2013   • Left inguinal hernia 3/14/2013    small fat containing   • Morbid obesity ( right great toe-pins   • OTHER SURGICAL HISTORY      BILATERAL A  PMM    • OTHER SURGICAL HISTORY      TRIGGER FINGER RIGHT    • OTHER SURGICAL HISTORY      bowel resection   • OTHER SURGICAL HISTORY  11/14/2013    surgery for perforated viscus   • OTHER S vital signs reviewed. All other systems reviewed and negative except as noted above.     Physical Exam     ED Triage Vitals [01/13/22 1415]   /78   Pulse 76   Resp 18   Temp 98.4 °F (36.9 °C)   Temp src Temporal   SpO2 96 %   O2 Device None (Room listed allergies to topical antibiotics and has been using that on the rash. I told him to discontinue that and I will continue him on oral antibiotics. He will wash the area with mild soap and try to leave it open to air is much as possible.   If he has

## 2022-01-13 NOTE — TELEPHONE ENCOUNTER
Has appointment for Saturday, do you want him seen sooner? Please advise    He has a left leg rash behind knee and calf, leaking fluid, 3 inches around, bumps, red, it burns.  It started out as a quarter size      Just wanted to send this just in case he ne

## 2022-01-15 ENCOUNTER — OFFICE VISIT (OUTPATIENT)
Dept: FAMILY MEDICINE CLINIC | Facility: CLINIC | Age: 77
End: 2022-01-15
Payer: MEDICARE

## 2022-01-15 VITALS
HEIGHT: 70 IN | RESPIRATION RATE: 16 BRPM | BODY MASS INDEX: 35.65 KG/M2 | WEIGHT: 249 LBS | HEART RATE: 78 BPM | OXYGEN SATURATION: 98 % | DIASTOLIC BLOOD PRESSURE: 70 MMHG | SYSTOLIC BLOOD PRESSURE: 114 MMHG

## 2022-01-15 DIAGNOSIS — L30.9 DERMATITIS: ICD-10-CM

## 2022-01-15 DIAGNOSIS — L03.116 LEFT LEG CELLULITIS: Primary | ICD-10-CM

## 2022-01-15 PROCEDURE — 99214 OFFICE O/P EST MOD 30 MIN: CPT | Performed by: FAMILY MEDICINE

## 2022-01-15 RX ORDER — METHYLPREDNISOLONE 4 MG/1
TABLET ORAL
Qty: 21 EACH | Refills: 0 | Status: SHIPPED | OUTPATIENT
Start: 2022-01-15 | End: 2022-01-21 | Stop reason: ALTCHOICE

## 2022-01-15 NOTE — PROGRESS NOTES
[facility] Office Note  Chief Complaint:   Patient presents with:  ER F/U: Pt here to follow up ER visit 1/13/2022 for LT leg celullitis  . Pt notes now has cellulitis to RT leg.  Pt was given abx / with some improvement / reports • Hepatic cyst 3/14/2013    Lt hepatic cysts   • History of blood transfusion 2001   • HTN (hypertension) 11/16/2013   • Internal hemorrhoids 5/2/2013   • Kidney stone     nephrolithiasis, 3/14/2013 nonobstructing stones Rt lower pole kidney   • Pia and right inguinal   • HERNIA SURGERY  2005    hernia repair   • HERNIA SURGERY  1/2017    ventral hernia with mesh   • I&D RECTAL SUBMUCOSAL ABSCESS     • ORTHOPEDIC SURG (PBP)      shattered right great toe-pins   • OTHER SURGICAL HISTORY      BILATERAL Glasses of wine per week      Comment: social    Drug use: No    Family History:  Family History   Problem Relation Age of Onset   • Other (Other) Father         water on the brain   • Diabetes Mother    • Diabetes Brother    • Diabetes Brother    • Hypert reviewed and are negative.        EXAM:   /70   Pulse 78   Resp 16   Ht 5' 10\" (1.778 m)   Wt 249 lb (112.9 kg)   SpO2 98%   BMI 35.73 kg/m²  Estimated body mass index is 35.73 kg/m² as calculated from the following:    Height as of this encounter: 5 changing symptoms. Patient is to call with any side effects or complications from the treatments as a result of today.      Problem List:  Patient Active Problem List:     Primary localized osteoarthrosis, lower leg     Arthropathy     Cardiomyopathy ([facility] [address]

## 2022-01-21 ENCOUNTER — OFFICE VISIT (OUTPATIENT)
Dept: FAMILY MEDICINE CLINIC | Facility: CLINIC | Age: 77
End: 2022-01-21
Payer: MEDICARE

## 2022-01-21 VITALS
BODY MASS INDEX: 35.79 KG/M2 | OXYGEN SATURATION: 98 % | WEIGHT: 250 LBS | HEIGHT: 70 IN | DIASTOLIC BLOOD PRESSURE: 78 MMHG | SYSTOLIC BLOOD PRESSURE: 118 MMHG | HEART RATE: 67 BPM | RESPIRATION RATE: 16 BRPM

## 2022-01-21 DIAGNOSIS — I10 PRIMARY HYPERTENSION: ICD-10-CM

## 2022-01-21 DIAGNOSIS — L30.9 DERMATITIS: Primary | ICD-10-CM

## 2022-01-21 DIAGNOSIS — L20.84 INTRINSIC ECZEMA: ICD-10-CM

## 2022-01-21 DIAGNOSIS — E11.65 TYPE 2 DIABETES MELLITUS WITH HYPERGLYCEMIA, WITHOUT LONG-TERM CURRENT USE OF INSULIN (HCC): ICD-10-CM

## 2022-01-21 PROBLEM — E66.01 MORBID (SEVERE) OBESITY DUE TO EXCESS CALORIES (HCC): Status: ACTIVE | Noted: 2022-01-21

## 2022-01-21 PROCEDURE — 99214 OFFICE O/P EST MOD 30 MIN: CPT | Performed by: FAMILY MEDICINE

## 2022-01-21 RX ORDER — BETAMETHASONE DIPROPIONATE 0.05 %
OINTMENT (GRAM) TOPICAL
Qty: 45 G | Refills: 0 | Status: SHIPPED | OUTPATIENT
Start: 2022-01-21

## 2022-01-21 NOTE — PROGRESS NOTES
Subjective:   Yovani Cox. is a 68year old male who presents for Follow - Up (left leg cellulitis.  pt finished steriod, currently on antibotic)     Presenting for follow up on left leg cellulitis and possible allergic dermatis with some improvemen Review of Systems:  Review of Systems  Left posterior leg swelling and redness.      Objective:   /78   Pulse 67   Resp 16   Ht 5' 10\" (1.778 m)   Wt 250 lb (113.4 kg)   SpO2 98%   BMI 35.87 kg/m²  Estimated body mass index is 35.87 kg/m² as ca

## 2022-01-24 ENCOUNTER — TELEPHONE (OUTPATIENT)
Dept: FAMILY MEDICINE CLINIC | Facility: CLINIC | Age: 77
End: 2022-01-24

## 2022-01-24 RX ORDER — PREDNISONE 20 MG/1
20 TABLET ORAL DAILY
Qty: 5 TABLET | Refills: 0 | Status: SHIPPED | OUTPATIENT
Start: 2022-01-24 | End: 2022-01-29

## 2022-01-24 RX ORDER — CEPHALEXIN 500 MG/1
500 CAPSULE ORAL 3 TIMES DAILY
Qty: 15 CAPSULE | Refills: 0 | Status: SHIPPED | OUTPATIENT
Start: 2022-01-24 | End: 2022-01-29

## 2022-01-24 NOTE — TELEPHONE ENCOUNTER
Ok for prednisone 20 mg x 5 days. Also, send over keflex 500 mg po tid x 5 days too to treat any secondary bacterial infection.

## 2022-01-24 NOTE — TELEPHONE ENCOUNTER
Patient states the Betamethasone cream given to him on Friday has cause  a systemic itching and hive reaction. His leg remains dry, no oozing or open areas to wound, he wants to know if he should be doing anything else.  He states he generally has problems

## 2022-01-24 NOTE — TELEPHONE ENCOUNTER
Pt states he's allergic to the topical cream he was prescribed and wants to talk to a RN.  I asked  If he is requesting another topical he said no  He  Just wants to talk to the RN>

## 2022-02-15 ENCOUNTER — LAB ENCOUNTER (OUTPATIENT)
Dept: LAB | Age: 77
End: 2022-02-15
Attending: FAMILY MEDICINE
Payer: MEDICARE

## 2022-02-15 DIAGNOSIS — E11.65 TYPE 2 DIABETES MELLITUS WITH HYPERGLYCEMIA, WITHOUT LONG-TERM CURRENT USE OF INSULIN (HCC): ICD-10-CM

## 2022-02-15 DIAGNOSIS — I10 PRIMARY HYPERTENSION: ICD-10-CM

## 2022-02-15 LAB
ALBUMIN SERPL-MCNC: 3.9 G/DL (ref 3.4–5)
ALBUMIN/GLOB SERPL: 1.4 {RATIO} (ref 1–2)
ALP LIVER SERPL-CCNC: 58 U/L
ALT SERPL-CCNC: 22 U/L
ANION GAP SERPL CALC-SCNC: 5 MMOL/L (ref 0–18)
AST SERPL-CCNC: 13 U/L (ref 15–37)
BILIRUB SERPL-MCNC: 1.2 MG/DL (ref 0.1–2)
BUN BLD-MCNC: 19 MG/DL (ref 7–18)
CALCIUM BLD-MCNC: 8.9 MG/DL (ref 8.5–10.1)
CHLORIDE SERPL-SCNC: 106 MMOL/L (ref 98–112)
CHOLEST SERPL-MCNC: 119 MG/DL (ref ?–200)
CO2 SERPL-SCNC: 26 MMOL/L (ref 21–32)
CREAT BLD-MCNC: 0.85 MG/DL
CREAT UR-SCNC: 114 MG/DL
EST. AVERAGE GLUCOSE BLD GHB EST-MCNC: 137 MG/DL (ref 68–126)
FASTING PATIENT LIPID ANSWER: YES
FASTING STATUS PATIENT QL REPORTED: YES
GLOBULIN PLAS-MCNC: 2.8 G/DL (ref 2.8–4.4)
GLUCOSE BLD-MCNC: 122 MG/DL (ref 70–99)
HBA1C MFR BLD: 6.4 % (ref ?–5.7)
HDLC SERPL-MCNC: 39 MG/DL (ref 40–59)
LDLC SERPL CALC-MCNC: 60 MG/DL (ref ?–100)
MICROALBUMIN UR-MCNC: 0.77 MG/DL
MICROALBUMIN/CREAT 24H UR-RTO: 6.8 UG/MG (ref ?–30)
NONHDLC SERPL-MCNC: 80 MG/DL (ref ?–130)
OSMOLALITY SERPL CALC.SUM OF ELEC: 288 MOSM/KG (ref 275–295)
POTASSIUM SERPL-SCNC: 4.5 MMOL/L (ref 3.5–5.1)
PROT SERPL-MCNC: 6.7 G/DL (ref 6.4–8.2)
SODIUM SERPL-SCNC: 137 MMOL/L (ref 136–145)
TRIGL SERPL-MCNC: 110 MG/DL (ref 30–149)
VLDLC SERPL CALC-MCNC: 16 MG/DL (ref 0–30)

## 2022-02-15 PROCEDURE — 82570 ASSAY OF URINE CREATININE: CPT

## 2022-02-15 PROCEDURE — 83036 HEMOGLOBIN GLYCOSYLATED A1C: CPT

## 2022-02-15 PROCEDURE — 36415 COLL VENOUS BLD VENIPUNCTURE: CPT

## 2022-02-15 PROCEDURE — 80053 COMPREHEN METABOLIC PANEL: CPT

## 2022-02-15 PROCEDURE — 80061 LIPID PANEL: CPT

## 2022-02-15 PROCEDURE — 82043 UR ALBUMIN QUANTITATIVE: CPT

## 2022-02-22 ENCOUNTER — TELEPHONE (OUTPATIENT)
Dept: FAMILY MEDICINE CLINIC | Facility: CLINIC | Age: 77
End: 2022-02-22

## 2022-02-22 NOTE — TELEPHONE ENCOUNTER
----- Message from Shayy Britton MD sent at 2/22/2022  2:25 PM CST -----  Stable labs patient's more in prediabetic range advised him on very low-carb diet if not we will have to restart his Metformin. recheck diabetic labs in 6 months.     mychart to pt and labs in system

## 2022-04-11 ENCOUNTER — OFFICE VISIT (OUTPATIENT)
Dept: FAMILY MEDICINE CLINIC | Facility: CLINIC | Age: 77
End: 2022-04-11
Payer: MEDICARE

## 2022-04-11 VITALS
HEART RATE: 74 BPM | DIASTOLIC BLOOD PRESSURE: 84 MMHG | BODY MASS INDEX: 35.36 KG/M2 | HEIGHT: 70 IN | WEIGHT: 247 LBS | SYSTOLIC BLOOD PRESSURE: 120 MMHG | RESPIRATION RATE: 16 BRPM | OXYGEN SATURATION: 98 %

## 2022-04-11 DIAGNOSIS — J42 CHRONIC BRONCHITIS, UNSPECIFIED CHRONIC BRONCHITIS TYPE (HCC): ICD-10-CM

## 2022-04-11 DIAGNOSIS — N40.1 BENIGN NON-NODULAR PROSTATIC HYPERPLASIA WITH LOWER URINARY TRACT SYMPTOMS: ICD-10-CM

## 2022-04-11 DIAGNOSIS — E11.65 TYPE 2 DIABETES MELLITUS WITH HYPERGLYCEMIA, WITHOUT LONG-TERM CURRENT USE OF INSULIN (HCC): ICD-10-CM

## 2022-04-11 DIAGNOSIS — E66.01 MORBID (SEVERE) OBESITY DUE TO EXCESS CALORIES (HCC): ICD-10-CM

## 2022-04-11 DIAGNOSIS — Z00.00 MEDICARE ANNUAL WELLNESS VISIT, SUBSEQUENT: Primary | ICD-10-CM

## 2022-04-11 DIAGNOSIS — I10 PRIMARY HYPERTENSION: ICD-10-CM

## 2022-04-11 DIAGNOSIS — G47.33 OSA ON CPAP: ICD-10-CM

## 2022-04-11 DIAGNOSIS — I42.0 DILATED CARDIOMYOPATHY (HCC): ICD-10-CM

## 2022-04-11 DIAGNOSIS — Z99.89 OSA ON CPAP: ICD-10-CM

## 2022-04-11 PROBLEM — M25.612 DECREASED ROM OF LEFT SHOULDER: Status: RESOLVED | Noted: 2018-10-16 | Resolved: 2022-04-11

## 2022-04-11 PROBLEM — M79.605 LEFT LEG PAIN: Status: RESOLVED | Noted: 2019-02-07 | Resolved: 2022-04-11

## 2022-04-11 PROCEDURE — G0439 PPPS, SUBSEQ VISIT: HCPCS | Performed by: FAMILY MEDICINE

## 2022-09-13 ENCOUNTER — APPOINTMENT (OUTPATIENT)
Dept: GENERAL RADIOLOGY | Age: 77
End: 2022-09-13
Attending: EMERGENCY MEDICINE
Payer: MEDICARE

## 2022-09-13 ENCOUNTER — HOSPITAL ENCOUNTER (EMERGENCY)
Age: 77
Discharge: HOME | End: 2022-09-13
Attending: EMERGENCY MEDICINE
Payer: MEDICARE

## 2022-09-13 ENCOUNTER — TELEPHONE (OUTPATIENT)
Dept: FAMILY MEDICINE CLINIC | Facility: CLINIC | Age: 77
End: 2022-09-13

## 2022-09-13 VITALS
HEIGHT: 70 IN | OXYGEN SATURATION: 98 % | HEART RATE: 81 BPM | BODY MASS INDEX: 35.79 KG/M2 | SYSTOLIC BLOOD PRESSURE: 121 MMHG | DIASTOLIC BLOOD PRESSURE: 59 MMHG | WEIGHT: 250 LBS | RESPIRATION RATE: 18 BRPM | TEMPERATURE: 98 F

## 2022-09-13 DIAGNOSIS — M25.551 RIGHT HIP PAIN: Primary | ICD-10-CM

## 2022-09-13 PROCEDURE — 99283 EMERGENCY DEPT VISIT LOW MDM: CPT | Performed by: EMERGENCY MEDICINE

## 2022-09-13 PROCEDURE — 73502 X-RAY EXAM HIP UNI 2-3 VIEWS: CPT | Performed by: EMERGENCY MEDICINE

## 2022-09-13 PROCEDURE — 99284 EMERGENCY DEPT VISIT MOD MDM: CPT | Performed by: EMERGENCY MEDICINE

## 2022-09-13 PROCEDURE — 72110 X-RAY EXAM L-2 SPINE 4/>VWS: CPT | Performed by: EMERGENCY MEDICINE

## 2022-09-13 RX ORDER — METHYLPREDNISOLONE 4 MG/1
TABLET ORAL
Qty: 1 EACH | Refills: 0 | Status: SHIPPED | OUTPATIENT
Start: 2022-09-13

## 2022-09-13 RX ORDER — HYDROCODONE BITARTRATE AND ACETAMINOPHEN 5; 325 MG/1; MG/1
1-2 TABLET ORAL EVERY 6 HOURS PRN
Qty: 20 TABLET | Refills: 0 | Status: SHIPPED | OUTPATIENT
Start: 2022-09-13 | End: 2022-09-23

## 2022-09-13 NOTE — TELEPHONE ENCOUNTER
I spoke with pt, he c/o worsening hip pain this past week, today he can hardly walk. Denies injury to hip. I offered pt appt today & he declined, he would like to go to ER. All questions answered, pt expresses understanding.

## 2022-10-25 ENCOUNTER — OFFICE VISIT (OUTPATIENT)
Dept: FAMILY MEDICINE CLINIC | Facility: CLINIC | Age: 77
End: 2022-10-25
Payer: MEDICARE

## 2022-10-25 VITALS — BODY MASS INDEX: 33.64 KG/M2 | HEIGHT: 70 IN | WEIGHT: 235 LBS

## 2022-10-25 DIAGNOSIS — R05.1 ACUTE COUGH: Primary | ICD-10-CM

## 2022-10-25 DIAGNOSIS — J06.9 VIRAL UPPER RESPIRATORY TRACT INFECTION: ICD-10-CM

## 2022-10-25 DIAGNOSIS — R09.81 SINUS CONGESTION: ICD-10-CM

## 2022-10-25 LAB
OPERATOR ID: NORMAL
POCT LOT NUMBER: NORMAL
RAPID SARS-COV-2 BY PCR: NOT DETECTED

## 2022-10-25 PROCEDURE — 99213 OFFICE O/P EST LOW 20 MIN: CPT | Performed by: NURSE PRACTITIONER

## 2022-10-25 PROCEDURE — U0002 COVID-19 LAB TEST NON-CDC: HCPCS | Performed by: NURSE PRACTITIONER

## 2022-10-25 RX ORDER — BENZONATATE 200 MG/1
200 CAPSULE ORAL 3 TIMES DAILY PRN
Qty: 20 CAPSULE | Refills: 0 | Status: SHIPPED | OUTPATIENT
Start: 2022-10-25 | End: 2022-11-01

## 2022-11-09 RX ORDER — MELOXICAM 15 MG/1
TABLET ORAL
Qty: 90 TABLET | Refills: 3 | Status: SHIPPED | OUTPATIENT
Start: 2022-11-09

## 2023-03-10 ENCOUNTER — TELEPHONE (OUTPATIENT)
Dept: FAMILY MEDICINE CLINIC | Facility: CLINIC | Age: 78
End: 2023-03-10

## 2023-03-10 DIAGNOSIS — Z85.46 PERSONAL HISTORY OF MALIGNANT NEOPLASM OF PROSTATE: Primary | ICD-10-CM

## 2023-03-10 DIAGNOSIS — I10 PRIMARY HYPERTENSION: ICD-10-CM

## 2023-03-10 NOTE — TELEPHONE ENCOUNTER
There are lab orders from 04/11/2022 that was never completed by pt. Additional lab orders placed per office protocol. Pt called and was informed of above. All questions answered and pt verbalized understanding.

## 2023-03-10 NOTE — TELEPHONE ENCOUNTER
Patient doing AWV on 4/5/23, wants to make sure his A1C and his other labs are put in.  He would like to do prior to his appointment

## 2023-03-28 ENCOUNTER — LAB ENCOUNTER (OUTPATIENT)
Dept: LAB | Age: 78
End: 2023-03-28
Attending: FAMILY MEDICINE
Payer: MEDICARE

## 2023-03-28 DIAGNOSIS — J42 CHRONIC BRONCHITIS, UNSPECIFIED CHRONIC BRONCHITIS TYPE (HCC): ICD-10-CM

## 2023-03-28 DIAGNOSIS — I10 PRIMARY HYPERTENSION: ICD-10-CM

## 2023-03-28 DIAGNOSIS — Z85.46 PERSONAL HISTORY OF MALIGNANT NEOPLASM OF PROSTATE: ICD-10-CM

## 2023-03-28 DIAGNOSIS — E66.01 MORBID (SEVERE) OBESITY DUE TO EXCESS CALORIES (HCC): ICD-10-CM

## 2023-03-28 DIAGNOSIS — E11.65 TYPE 2 DIABETES MELLITUS WITH HYPERGLYCEMIA, WITHOUT LONG-TERM CURRENT USE OF INSULIN (HCC): ICD-10-CM

## 2023-03-28 LAB
ALBUMIN SERPL-MCNC: 3.6 G/DL (ref 3.4–5)
ALBUMIN/GLOB SERPL: 1.1 {RATIO} (ref 1–2)
ALP LIVER SERPL-CCNC: 64 U/L
ALT SERPL-CCNC: 26 U/L
ANION GAP SERPL CALC-SCNC: 6 MMOL/L (ref 0–18)
AST SERPL-CCNC: 19 U/L (ref 15–37)
BASOPHILS # BLD AUTO: 0.03 X10(3) UL (ref 0–0.2)
BASOPHILS NFR BLD AUTO: 0.6 %
BILIRUB SERPL-MCNC: 1 MG/DL (ref 0.1–2)
BUN BLD-MCNC: 21 MG/DL (ref 7–18)
CALCIUM BLD-MCNC: 8.9 MG/DL (ref 8.5–10.1)
CHLORIDE SERPL-SCNC: 108 MMOL/L (ref 98–112)
CHOLEST SERPL-MCNC: 107 MG/DL (ref ?–200)
CO2 SERPL-SCNC: 26 MMOL/L (ref 21–32)
CREAT BLD-MCNC: 0.93 MG/DL
EOSINOPHIL # BLD AUTO: 0.14 X10(3) UL (ref 0–0.7)
EOSINOPHIL NFR BLD AUTO: 2.9 %
ERYTHROCYTE [DISTWIDTH] IN BLOOD BY AUTOMATED COUNT: 13.1 %
EST. AVERAGE GLUCOSE BLD GHB EST-MCNC: 120 MG/DL (ref 68–126)
FASTING PATIENT LIPID ANSWER: YES
FASTING STATUS PATIENT QL REPORTED: YES
GFR SERPLBLD BASED ON 1.73 SQ M-ARVRAT: 85 ML/MIN/1.73M2 (ref 60–?)
GLOBULIN PLAS-MCNC: 3.2 G/DL (ref 2.8–4.4)
GLUCOSE BLD-MCNC: 118 MG/DL (ref 70–99)
HBA1C MFR BLD: 5.8 % (ref ?–5.7)
HCT VFR BLD AUTO: 45.1 %
HDLC SERPL-MCNC: 35 MG/DL (ref 40–59)
HGB BLD-MCNC: 15.1 G/DL
IMM GRANULOCYTES # BLD AUTO: 0.01 X10(3) UL (ref 0–1)
IMM GRANULOCYTES NFR BLD: 0.2 %
LDLC SERPL CALC-MCNC: 54 MG/DL (ref ?–100)
LYMPHOCYTES # BLD AUTO: 1.38 X10(3) UL (ref 1–4)
LYMPHOCYTES NFR BLD AUTO: 28.1 %
MCH RBC QN AUTO: 29.6 PG (ref 26–34)
MCHC RBC AUTO-ENTMCNC: 33.5 G/DL (ref 31–37)
MCV RBC AUTO: 88.4 FL
MONOCYTES # BLD AUTO: 0.47 X10(3) UL (ref 0.1–1)
MONOCYTES NFR BLD AUTO: 9.6 %
NEUTROPHILS # BLD AUTO: 2.88 X10 (3) UL (ref 1.5–7.7)
NEUTROPHILS # BLD AUTO: 2.88 X10(3) UL (ref 1.5–7.7)
NEUTROPHILS NFR BLD AUTO: 58.6 %
NONHDLC SERPL-MCNC: 72 MG/DL (ref ?–130)
OSMOLALITY SERPL CALC.SUM OF ELEC: 294 MOSM/KG (ref 275–295)
PLATELET # BLD AUTO: 178 10(3)UL (ref 150–450)
POTASSIUM SERPL-SCNC: 4.2 MMOL/L (ref 3.5–5.1)
PROT SERPL-MCNC: 6.8 G/DL (ref 6.4–8.2)
PSA SERPL-MCNC: 1.43 NG/ML (ref ?–4)
RBC # BLD AUTO: 5.1 X10(6)UL
SODIUM SERPL-SCNC: 140 MMOL/L (ref 136–145)
TRIGL SERPL-MCNC: 95 MG/DL (ref 30–149)
TSI SER-ACNC: 2.15 MIU/ML (ref 0.36–3.74)
VLDLC SERPL CALC-MCNC: 14 MG/DL (ref 0–30)
WBC # BLD AUTO: 4.9 X10(3) UL (ref 4–11)

## 2023-03-28 PROCEDURE — 83036 HEMOGLOBIN GLYCOSYLATED A1C: CPT

## 2023-03-28 PROCEDURE — 36415 COLL VENOUS BLD VENIPUNCTURE: CPT

## 2023-03-28 PROCEDURE — 85025 COMPLETE CBC W/AUTO DIFF WBC: CPT

## 2023-03-28 PROCEDURE — 84443 ASSAY THYROID STIM HORMONE: CPT

## 2023-03-28 PROCEDURE — 80053 COMPREHEN METABOLIC PANEL: CPT

## 2023-03-28 PROCEDURE — 84153 ASSAY OF PSA TOTAL: CPT

## 2023-03-28 PROCEDURE — 80061 LIPID PANEL: CPT

## 2023-04-05 ENCOUNTER — OFFICE VISIT (OUTPATIENT)
Dept: FAMILY MEDICINE CLINIC | Facility: CLINIC | Age: 78
End: 2023-04-05
Payer: MEDICARE

## 2023-04-05 ENCOUNTER — TELEPHONE (OUTPATIENT)
Dept: FAMILY MEDICINE CLINIC | Facility: CLINIC | Age: 78
End: 2023-04-05

## 2023-04-05 ENCOUNTER — HOSPITAL ENCOUNTER (OUTPATIENT)
Dept: GENERAL RADIOLOGY | Age: 78
Discharge: HOME | End: 2023-04-05
Attending: FAMILY MEDICINE
Payer: MEDICARE

## 2023-04-05 VITALS
HEART RATE: 73 BPM | RESPIRATION RATE: 16 BRPM | SYSTOLIC BLOOD PRESSURE: 120 MMHG | OXYGEN SATURATION: 97 % | BODY MASS INDEX: 34.5 KG/M2 | WEIGHT: 241 LBS | HEIGHT: 70 IN | DIASTOLIC BLOOD PRESSURE: 78 MMHG

## 2023-04-05 DIAGNOSIS — Z99.89 OSA ON CPAP: ICD-10-CM

## 2023-04-05 DIAGNOSIS — G47.33 OSA ON CPAP: ICD-10-CM

## 2023-04-05 DIAGNOSIS — N40.1 BENIGN NON-NODULAR PROSTATIC HYPERPLASIA WITH LOWER URINARY TRACT SYMPTOMS: ICD-10-CM

## 2023-04-05 DIAGNOSIS — K63.5 POLYP OF COLON, UNSPECIFIED PART OF COLON, UNSPECIFIED TYPE: ICD-10-CM

## 2023-04-05 DIAGNOSIS — Z00.00 MEDICARE ANNUAL WELLNESS VISIT, SUBSEQUENT: Primary | ICD-10-CM

## 2023-04-05 DIAGNOSIS — E11.65 TYPE 2 DIABETES MELLITUS WITH HYPERGLYCEMIA, WITHOUT LONG-TERM CURRENT USE OF INSULIN (HCC): ICD-10-CM

## 2023-04-05 DIAGNOSIS — M17.10 PRIMARY LOCALIZED OSTEOARTHROSIS OF LOWER LEG, UNSPECIFIED LATERALITY: ICD-10-CM

## 2023-04-05 DIAGNOSIS — I42.0 DILATED CARDIOMYOPATHY (HCC): ICD-10-CM

## 2023-04-05 DIAGNOSIS — M25.511 CHRONIC RIGHT SHOULDER PAIN: ICD-10-CM

## 2023-04-05 DIAGNOSIS — M17.0 PRIMARY OSTEOARTHRITIS OF BOTH KNEES: ICD-10-CM

## 2023-04-05 DIAGNOSIS — Z00.00 ENCOUNTER FOR ANNUAL HEALTH EXAMINATION: ICD-10-CM

## 2023-04-05 DIAGNOSIS — G89.29 CHRONIC RIGHT SHOULDER PAIN: ICD-10-CM

## 2023-04-05 DIAGNOSIS — M19.012 OSTEOARTHRITIS OF LEFT GLENOHUMERAL JOINT: ICD-10-CM

## 2023-04-05 DIAGNOSIS — I10 PRIMARY HYPERTENSION: ICD-10-CM

## 2023-04-05 DIAGNOSIS — I44.7 LEFT BUNDLE BRANCH BLOCK: ICD-10-CM

## 2023-04-05 DIAGNOSIS — K76.89 HEPATIC CYST: ICD-10-CM

## 2023-04-05 DIAGNOSIS — N20.0 RECURRENT KIDNEY STONES: ICD-10-CM

## 2023-04-05 DIAGNOSIS — J42 CHRONIC BRONCHITIS, UNSPECIFIED CHRONIC BRONCHITIS TYPE (HCC): ICD-10-CM

## 2023-04-05 DIAGNOSIS — K76.0 FATTY INFILTRATION OF LIVER: ICD-10-CM

## 2023-04-05 DIAGNOSIS — M75.41 IMPINGEMENT SYNDROME OF RIGHT SHOULDER: ICD-10-CM

## 2023-04-05 DIAGNOSIS — E66.01 MORBID (SEVERE) OBESITY DUE TO EXCESS CALORIES (HCC): ICD-10-CM

## 2023-04-05 DIAGNOSIS — N28.1 BILATERAL RENAL CYSTS: ICD-10-CM

## 2023-04-05 PROBLEM — M75.81 ROTATOR CUFF TENDINITIS, RIGHT: Status: RESOLVED | Noted: 2017-11-07 | Resolved: 2023-04-05

## 2023-04-05 PROCEDURE — 73565 X-RAY EXAM OF KNEES: CPT | Performed by: FAMILY MEDICINE

## 2023-04-05 RX ORDER — MELOXICAM 15 MG/1
15 TABLET ORAL DAILY
Qty: 90 TABLET | Refills: 1 | Status: SHIPPED | OUTPATIENT
Start: 2023-04-05

## 2023-04-05 RX ORDER — MELOXICAM 15 MG/1
15 TABLET ORAL DAILY
Qty: 90 TABLET | Refills: 1 | Status: SHIPPED | OUTPATIENT
Start: 2023-04-05 | End: 2023-04-05

## 2023-04-05 RX ORDER — MELOXICAM 15 MG/1
15 TABLET ORAL DAILY
Qty: 90 TABLET | Refills: 3 | Status: SHIPPED | OUTPATIENT
Start: 2023-04-05 | End: 2023-04-05 | Stop reason: CLARIF

## 2023-04-05 NOTE — TELEPHONE ENCOUNTER
Patient called regarding    Meloxicam 15 MG Oral Tab    Patient states he already takes this on a regular basis and gets it thru [facility]. The script that was sent to [facility] needs to be cancelled and resent to [facility]. Please Advise. Thank you.

## 2023-04-07 ENCOUNTER — TELEPHONE (OUTPATIENT)
Dept: FAMILY MEDICINE CLINIC | Facility: CLINIC | Age: 78
End: 2023-04-07

## 2023-04-07 DIAGNOSIS — M25.562 PAIN IN BOTH KNEES, UNSPECIFIED CHRONICITY: Primary | ICD-10-CM

## 2023-04-07 DIAGNOSIS — M17.0 OSTEOARTHRITIS OF BOTH KNEES, UNSPECIFIED OSTEOARTHRITIS TYPE: ICD-10-CM

## 2023-04-07 DIAGNOSIS — M25.561 PAIN IN BOTH KNEES, UNSPECIFIED CHRONICITY: Primary | ICD-10-CM

## 2023-04-07 NOTE — TELEPHONE ENCOUNTER
----- Message from Champ Skinner MD sent at 4/6/2023  2:48 PM CDT -----  Severe OA, should see Dr. Elizabeth Cantrell to pt  Provider placed referral

## 2023-04-24 ENCOUNTER — TELEPHONE (OUTPATIENT)
Dept: ORTHOPEDICS CLINIC | Facility: CLINIC | Age: 78
End: 2023-04-24

## 2023-04-24 NOTE — TELEPHONE ENCOUNTER
Patient is scheduled with Dr. Osmar Renteria for bilateral knee pain. Please advise if imaging is needed.

## 2023-06-05 ENCOUNTER — OFFICE VISIT (OUTPATIENT)
Dept: ORTHOPEDICS CLINIC | Facility: CLINIC | Age: 78
End: 2023-06-05
Payer: MEDICARE

## 2023-06-05 VITALS — BODY MASS INDEX: 35.9 KG/M2 | HEIGHT: 69 IN | WEIGHT: 242.38 LBS

## 2023-06-05 DIAGNOSIS — M17.0 PRIMARY OSTEOARTHRITIS OF BOTH KNEES: Primary | ICD-10-CM

## 2023-06-05 RX ORDER — TRIAMCINOLONE ACETONIDE 40 MG/ML
80 INJECTION, SUSPENSION INTRA-ARTICULAR; INTRAMUSCULAR ONCE
Status: COMPLETED | OUTPATIENT
Start: 2023-06-05 | End: 2023-06-05

## 2023-06-05 RX ADMIN — TRIAMCINOLONE ACETONIDE 80 MG: 40 INJECTION, SUSPENSION INTRA-ARTICULAR; INTRAMUSCULAR at 12:10:00

## 2023-06-05 NOTE — PROCEDURES
After informed consent, the patient's right and left knees were marked, locally anesthetized with skin refrigerant, prepped with topical antiseptic, and injected with a mixture of 1mL 40mg/mL Kenalog, 2mL 1% lidocaine and 2mL 0.5% marcaine through the inferolateral portal.  A band-aid was applied. The patient tolerated the procedure well.     Yesenia Naranjo MD, [address] [facility]  Phone [phone]  Fax [phone]

## 2023-06-12 ENCOUNTER — TELEPHONE (OUTPATIENT)
Dept: PHYSICAL THERAPY | Facility: HOSPITAL | Age: 78
End: 2023-06-12

## 2023-06-14 ENCOUNTER — OFFICE VISIT (OUTPATIENT)
Dept: PHYSICAL THERAPY | Age: 78
End: 2023-06-14
Attending: ORTHOPAEDIC SURGERY
Payer: MEDICARE

## 2023-06-14 DIAGNOSIS — M17.0 PRIMARY OSTEOARTHRITIS OF BOTH KNEES: ICD-10-CM

## 2023-06-14 PROCEDURE — 97110 THERAPEUTIC EXERCISES: CPT

## 2023-06-14 PROCEDURE — 97161 PT EVAL LOW COMPLEX 20 MIN: CPT

## 2023-06-19 ENCOUNTER — OFFICE VISIT (OUTPATIENT)
Dept: PHYSICAL THERAPY | Age: 78
End: 2023-06-19
Attending: ORTHOPAEDIC SURGERY
Payer: MEDICARE

## 2023-06-19 PROCEDURE — 97110 THERAPEUTIC EXERCISES: CPT

## 2023-06-21 ENCOUNTER — OFFICE VISIT (OUTPATIENT)
Dept: PHYSICAL THERAPY | Age: 78
End: 2023-06-21
Attending: ORTHOPAEDIC SURGERY
Payer: MEDICARE

## 2023-06-21 PROCEDURE — 97110 THERAPEUTIC EXERCISES: CPT

## 2023-06-28 ENCOUNTER — OFFICE VISIT (OUTPATIENT)
Dept: PHYSICAL THERAPY | Age: 78
End: 2023-06-28
Attending: ORTHOPAEDIC SURGERY
Payer: MEDICARE

## 2023-06-28 PROCEDURE — 97110 THERAPEUTIC EXERCISES: CPT

## 2023-06-28 PROCEDURE — 97140 MANUAL THERAPY 1/> REGIONS: CPT

## 2023-07-05 ENCOUNTER — OFFICE VISIT (OUTPATIENT)
Dept: PHYSICAL THERAPY | Age: 78
End: 2023-07-05
Attending: ORTHOPAEDIC SURGERY
Payer: MEDICARE

## 2023-07-05 PROCEDURE — 97140 MANUAL THERAPY 1/> REGIONS: CPT

## 2023-07-05 PROCEDURE — 97110 THERAPEUTIC EXERCISES: CPT

## 2023-07-05 NOTE — PROGRESS NOTES
Diagnosis:   Primary osteoarthritis of both knees (M17.0)      Referring Provider: Roslyn Severance  Date of Evaluation:    6/14/2023    Precautions:   Drug Allergy, Fall Risk, Visual Impairment, Adhesive Tape Allergy  Next MD visit:   none scheduled  Date of Surgery: n/a   Insurance Primary/Secondary: [facility] / N/A     # Auth Visits: 10v auth 6/14 to 8/14            Subjective:  \" patient stated that he tried to kneeled and he stopped due to \" getting mm cramping on the the back of B upper legs \". Patient stated that he is able to walk up the stairs with alternated gait pattern with \" less ache in my knees \". Patient stated that he feels 60 % better overall \". Patient stated that he felt \" less tenderness over hip B hip with lying down at night \". Pain: 0/10      Objective: See Flowsheet for details      Assessment: Continued with STM / foam roller over R/L ITB  and continued  with piriformis stretch and added PROM with gentle end range stretch  to facilitate and improve symmetrical gait pattern . Patient reported  increase in L hip mm cramping after manual intervention after this visit .       Goals:   (to be met in 12 visits)  Pt will improve bilateral knee extension ROM to 0 deg to allow proper heel strike during gait and terminal knee extension in stance  Pt will improve bilateral hip abduction strength to 4+/5 for improved mechanics with functional squatting  Pt will improve bilateral ankle DF AROM to at least + 5 degrees for improved ability to complete reciprocal stair negotiation  Pt will improve bilateral hamstring & piriformis flexibility to mod restricted or better for improved ability to rise to stand from the kneeled position  Pt will improve SLS to at least 15 sec bilaterally to improve safety with gait on uneven surfaces such as grass and gravel  Pt will be independent and compliant with comprehensive HEP to maintain progress achieved in PT     Plan: Assess for any feedback from new ex's today & progress with mobility & strengthening as juli. Ex's to consider: balance activities, clams  Date: 6/19/2023  TX#: 2/12 Date:   6/21/2023              TX#: 3/12 Date:   6/28/23              TX#: 4/12 Date:   7/05/23               TX#: 5/12  Date:    Tx#: 6/   Therex: 40'  Recumbent bike L3 x 5' for mobility  Seated HS stretch 15\" x 5 ea  Hip ABD Quin with Green TB in hooklying 5\" hold 3x10  Supine heel slide on board x 20 ea  SAQ over round bolster 2x10 ea  Bridges small range x 12  TKE against yellow ball at wall, x 15 ea  Seated HS curls x 10 ea Green TB  Slantboard gastroc stretch heels on ground B 15\" x 3 THERE EX : 45 MIN   NuStep x 6 min , level 4  Supine :  R/L piriformis stretch ( modified figure 4 ) x 5 reps each with 10 sec hold   R/L HS stretch with belt assist x 5 reps each with 20 sec hold   R/L heel slides ( on sliding board ) x 10 reps with 5 sec hold   Bridging x 10  R/L SLR with QS x 10 reps each   TKE with knee on bolster with  1.5 lbs x 20  S/L :   R/L clam shell x 20  R/L hip abduction x 20    Shuttle :  B leg press with 25 lbs x 20  R/L leg press with 12 lbs x 20     Standing :  gastroc stretch x 5 reps with 20 sec hold     Alt hip abduction with RTB x 20  Alt hip extension with RTB x 20  Lateral steps out with RTB in // bars x 2 rounds     Neuro - Red :    Air ex :  Marches x 20    Step up / lateral step up x 20                  THERE EX : 30 MIN   NuStep x 6 min , level 5  Supine :  R/L piriformis stretch ( figure 4 ) x 5 reps each with 10 sec hold   R/L HS stretch with belt OP x 5 reps each with 10 sec hold   R/L heel slides with belt OP x 10 reps each with 10 sec hold   Bridging x 15   R/L SLR with QS x 15 reps each   S/L :  R/L :  Clam shell 15 x 2   Hip abd x 20  Standing :  Gastroc stretch on slant board x 5 reps with 20 sec hold   Alt hip abduction with RTB x 25 reps                   Manual Terex x 15 min   R/L :STM / foam roller over  B  ITB    NU step x 6 min , level 5   MANUAL THERE EX X 20 MIN :  R/L STM/ friction massage / RF roller to lateral / posterior hip and ITB   L hip PROM with gentle end range stretch in all planes x 5 min   THERE EX  X 20 min   Standing in // bars : Alt hip flexion , abd , ext AROM x 20 reps each   Lateral steps in // bars x 2 rounds         HEP: Seated HS Stretch, Hip Abduction Isometric with Green TheraBand (TB) in hooklying, Supine or Seated Heel Slides    Charges:  Therex x  1, Manual There ex x 2       Total Timed Treatment: 40 min  Total Treatment Time: 45 min

## 2023-07-05 NOTE — PROGRESS NOTES
Diagnosis:   Primary osteoarthritis of both knees (M17.0)      Referring Provider: Rashmi Hinojosa  Date of Evaluation:    6/14/2023    Precautions:   Drug Allergy, Fall Risk, Visual Impairment, Adhesive Tape Allergy  Next MD visit:   none scheduled  Date of Surgery: n/a   Insurance Primary/Secondary: [facility] / N/A     # Auth Visits: 10v auth 6/14 to 8/14            Subjective:  \" patient stated that he tried to kneeled and he stopped due to \" getting mm cramping on the the back of B upper legs \". Patient stated that he is able to walk up the stairs with alternated gait pattern with \" less ache in my knees \". Patient stated that he feels 60 % better overall \". Patient stated that he felt \" less tenderness over hip B hip with lying down at night \". \" I seat in my car for 2 hrs in the past few day watching fireworks and my left hip got very tide and continue to feel tight \". Pain: 0/10      Objective: See Flowsheet for details      Assessment: Continued with STM / foam roller over R/L ITB  and continued  with piriformis stretch and added PROM with gentle end range stretch  to facilitate and improve symmetrical gait pattern . Patient reported  increase in L hip mm cramping after manual intervention after this visit .       Goals:   (to be met in 12 visits)  Pt will improve bilateral knee extension ROM to 0 deg to allow proper heel strike during gait and terminal knee extension in stance  Pt will improve bilateral hip abduction strength to 4+/5 for improved mechanics with functional squatting  Pt will improve bilateral ankle DF AROM to at least + 5 degrees for improved ability to complete reciprocal stair negotiation  Pt will improve bilateral hamstring & piriformis flexibility to mod restricted or better for improved ability to rise to stand from the kneeled position  Pt will improve SLS to at least 15 sec bilaterally to improve safety with gait on uneven surfaces such as grass and gravel  Pt will be independent and compliant with comprehensive HEP to maintain progress achieved in PT     Plan: Assess for any feedback from new ex's today & progress with mobility & strengthening as juli. Ex's to consider: balance activities, clams  Date: 6/19/2023  TX#: 2/12 Date:   6/21/2023              TX#: 3/12 Date:   6/28/23              TX#: 4/12 Date:   7/05/23               TX#: 5/12  Date:    Tx#: 6/   Therex: 40'  Recumbent bike L3 x 5' for mobility  Seated HS stretch 15\" x 5 ea  Hip ABD Quin with Green TB in hooklying 5\" hold 3x10  Supine heel slide on board x 20 ea  SAQ over round bolster 2x10 ea  Bridges small range x 12  TKE against yellow ball at wall, x 15 ea  Seated HS curls x 10 ea Green TB  Slantboard gastroc stretch heels on ground B 15\" x 3 THERE EX : 45 MIN   NuStep x 6 min , level 4  Supine :  R/L piriformis stretch ( modified figure 4 ) x 5 reps each with 10 sec hold   R/L HS stretch with belt assist x 5 reps each with 20 sec hold   R/L heel slides ( on sliding board ) x 10 reps with 5 sec hold   Bridging x 10  R/L SLR with QS x 10 reps each   TKE with knee on bolster with  1.5 lbs x 20  S/L :   R/L clam shell x 20  R/L hip abduction x 20    Shuttle :  B leg press with 25 lbs x 20  R/L leg press with 12 lbs x 20     Standing :  gastroc stretch x 5 reps with 20 sec hold     Alt hip abduction with RTB x 20  Alt hip extension with RTB x 20  Lateral steps out with RTB in // bars x 2 rounds     Neuro - Red :    Air ex :  Marches x 20    Step up / lateral step up x 20                  THERE EX : 30 MIN   NuStep x 6 min , level 5  Supine :  R/L piriformis stretch ( figure 4 ) x 5 reps each with 10 sec hold   R/L HS stretch with belt OP x 5 reps each with 10 sec hold   R/L heel slides with belt OP x 10 reps each with 10 sec hold   Bridging x 15   R/L SLR with QS x 15 reps each   S/L :  R/L :  Clam shell 15 x 2   Hip abd x 20  Standing :  Gastroc stretch on slant board x 5 reps with 20 sec hold   Alt hip abduction with RTB x 25 reps Manual Terex x 15 min   R/L :STM / foam roller over  B  ITB    NU step x 6 min , level 5   MANUAL THERE EX X 20 MIN :  R/L STM/ friction massage / RF roller to lateral / posterior hip and ITB   L hip PROM with gentle end range stretch in all planes x 5 min   THERE EX  X 20 min   Standing in // bars : Alt hip flexion , abd , ext AROM x 20 reps each   Lateral steps in // bars x 2 rounds         HEP: Seated HS Stretch, Hip Abduction Isometric with Green TheraBand (TB) in hooklying, Supine or Seated Heel Slides    Charges:  Therex x  1, Manual There ex x 2       Total Timed Treatment: 40 min  Total Treatment Time: 45 min

## 2023-07-07 ENCOUNTER — OFFICE VISIT (OUTPATIENT)
Dept: PHYSICAL THERAPY | Age: 78
End: 2023-07-07
Attending: ORTHOPAEDIC SURGERY
Payer: MEDICARE

## 2023-07-07 PROCEDURE — 97112 NEUROMUSCULAR REEDUCATION: CPT

## 2023-07-07 PROCEDURE — 97110 THERAPEUTIC EXERCISES: CPT

## 2023-07-07 NOTE — PROGRESS NOTES
Diagnosis:   Primary osteoarthritis of both knees (M17.0)      Referring Provider: Osmar Renteria  Date of Evaluation:    6/14/2023    Precautions:   Drug Allergy, Fall Risk, Visual Impairment, Adhesive Tape Allergy  Next MD visit:   Dr. Omi Hernandez 7/17  Frank VASQUEZ 7/24  Date of Surgery: n/a   Insurance Primary/Secondary: [facility] / N/A     # Auth Visits: Chiquis Paul 6/14 to 8/14            Subjective: Pt reports increased LE cramping bilaterally. Improved ability to walk up & down stairs. Made appointment with PCP & ortho PA. Pain: 3/10      Objective: See Flowsheet for details      Assessment: Cued with squats to hinge hips posteriorly for avoiding added strain to B knees. Pt able to tolerate session without need for seated rest break. Initial challenge with balance activities improved with repetition. Goals:   (to be met in 12 visits)  Pt will improve bilateral knee extension ROM to 0 deg to allow proper heel strike during gait and terminal knee extension in stance  Pt will improve bilateral hip abduction strength to 4+/5 for improved mechanics with functional squatting  Pt will improve bilateral ankle DF AROM to at least + 5 degrees for improved ability to complete reciprocal stair negotiation  Pt will improve bilateral hamstring & piriformis flexibility to mod restricted or better for improved ability to rise to stand from the kneeled position  Pt will improve SLS to at least 15 sec bilaterally to improve safety with gait on uneven surfaces such as grass and gravel  Pt will be independent and compliant with comprehensive HEP to maintain progress achieved in PT     Plan: Pt advised it is recommended he ensure he follows up with medical team (PCP & ortho) to rule out any other medical reasons for pt's LE cramping.   Date: 6/19/2023  TX#: 2/12 Date:   6/21/2023              TX#: 3/12 Date:   6/28/23              TX#: 4/12 Date:   7/05/23               TX#: 5/12  Date: 7/7/2023  Tx#: 6/12   Therex: 40'  Recumbent bike L3 x 5' for mobility  Seated HS stretch 15\" x 5 ea  Hip ABD Quin with Green TB in hooklying 5\" hold 3x10  Supine heel slide on board x 20 ea  SAQ over round bolster 2x10 ea  Bridges small range x 12  TKE against yellow ball at wall, x 15 ea  Seated HS curls x 10 ea Green TB  Slantboard gastroc stretch heels on ground B 15\" x 3 THERE EX : 45 MIN   NuStep x 6 min , level 4  Supine :  R/L piriformis stretch ( modified figure 4 ) x 5 reps each with 10 sec hold   R/L HS stretch with belt assist x 5 reps each with 20 sec hold   R/L heel slides ( on sliding board ) x 10 reps with 5 sec hold   Bridging x 10  R/L SLR with QS x 10 reps each   TKE with knee on bolster with  1.5 lbs x 20  S/L :   R/L clam shell x 20  R/L hip abduction x 20    Shuttle :  B leg press with 25 lbs x 20  R/L leg press with 12 lbs x 20     Standing :  gastroc stretch x 5 reps with 20 sec hold     Alt hip abduction with RTB x 20  Alt hip extension with RTB x 20  Lateral steps out with RTB in // bars x 2 rounds     Neuro - Red :    Air ex :  Marches x 20    Step up / lateral step up x 20                  THERE EX : 30 MIN   NuStep x 6 min , level 5  Supine :  R/L piriformis stretch ( figure 4 ) x 5 reps each with 10 sec hold   R/L HS stretch with belt OP x 5 reps each with 10 sec hold   R/L heel slides with belt OP x 10 reps each with 10 sec hold   Bridging x 15   R/L SLR with QS x 15 reps each   S/L :  R/L :  Clam shell 15 x 2   Hip abd x 20  Standing :  Gastroc stretch on slant board x 5 reps with 20 sec hold   Alt hip abduction with RTB x 25 reps                   Manual Terex x 15 min   R/L :STM / foam roller over  B  ITB    NU step x 6 min , level 5   MANUAL THERE EX X 20 MIN :  R/L STM/ friction massage / RF roller to lateral / posterior hip and ITB   L hip PROM with gentle end range stretch in all planes x 5 min   THERE EX  X 20 min   Standing in // bars :   Alt hip flexion , abd , ext AROM x 20 reps each   Lateral steps in // bars x 2 rounds      Therex: 30'  NuStep L6 x 6'  POC Options/ Discharge Planning  Standing in // bars : Alt hip flexion, abd, ext AROM x 20 reps each  Lateral steps in // bars x 4 rounds  Standing :  Gastroc stretch on slant board x 5 reps with 20 sec hold  Neuro Re-ed: 10'  Airex balance beam x 6 laps  Stand feet together Airex 4x30\"  R/L SLS balance 2x30\" ea Airex foam with fingertip assist     HEP: Seated HS Stretch, Hip Abduction Isometric with Green TheraBand (TB) in hooklying, Supine or Seated Heel Slides    Charges:  Therex x 2, Neuro x 1      Total Timed Treatment: 40 min  Total Treatment Time: 40 min

## 2023-07-10 ENCOUNTER — OFFICE VISIT (OUTPATIENT)
Dept: PHYSICAL THERAPY | Age: 78
End: 2023-07-10
Attending: ORTHOPAEDIC SURGERY
Payer: MEDICARE

## 2023-07-10 PROCEDURE — 97112 NEUROMUSCULAR REEDUCATION: CPT

## 2023-07-10 PROCEDURE — 97110 THERAPEUTIC EXERCISES: CPT

## 2023-07-10 NOTE — PROGRESS NOTES
Diagnosis:   Primary osteoarthritis of both knees (M17.0)      Referring Provider: Addison Chan  Date of Evaluation:    6/14/2023    Precautions:   Drug Allergy, Fall Risk, Visual Impairment, Adhesive Tape Allergy  Next MD visit:   Dr. Mk Nam 7/17  Estefany VASQUEZ 7/24  Date of Surgery: n/a   Insurance Primary/Secondary: [facility] / N/A     # Auth Visits: Reg Ceballos 6/14 to 8/14            Subjective: Pt stated that he has less \" legs cramping \" after last visit . Today patient report \" dull ache in B knees \". Pain: 3/10      Objective: See Flowsheet for details      Assessment: added alt lunges on BOSU to further progress stability with gait on uneven terrains . Patient demo quick self balance correction with all balance ex's on air ex . Goals:   (to be met in 12 visits)  Pt will improve bilateral knee extension ROM to 0 deg to allow proper heel strike during gait and terminal knee extension in stance  Pt will improve bilateral hip abduction strength to 4+/5 for improved mechanics with functional squatting  Pt will improve bilateral ankle DF AROM to at least + 5 degrees for improved ability to complete reciprocal stair negotiation  Pt will improve bilateral hamstring & piriformis flexibility to mod restricted or better for improved ability to rise to stand from the kneeled position  Pt will improve SLS to at least 15 sec bilaterally to improve safety with gait on uneven surfaces such as grass and gravel  Pt will be independent and compliant with comprehensive HEP to maintain progress achieved in PT     Plan: Pt advised it is recommended he ensure he follows up with medical team (PCP & ortho) to rule out any other medical reasons for pt's LE cramping.   Date:   6/21/2023              TX#: 3/12 Date:   6/28/23              TX#: 4/12 Date:   7/05/23               TX#: 5/12  Date: 7/7/2023  Tx#: 6/12 Date :7/10/2023  TX # 7/12    THERE EX : 45 MIN   NuStep x 6 min , level 4  Supine :  R/L piriformis stretch ( modified figure 4 ) x 5 reps each with 10 sec hold   R/L HS stretch with belt assist x 5 reps each with 20 sec hold   R/L heel slides ( on sliding board ) x 10 reps with 5 sec hold   Bridging x 10  R/L SLR with QS x 10 reps each   TKE with knee on bolster with  1.5 lbs x 20  S/L :   R/L clam shell x 20  R/L hip abduction x 20    Shuttle :  B leg press with 25 lbs x 20  R/L leg press with 12 lbs x 20     Standing :  gastroc stretch x 5 reps with 20 sec hold     Alt hip abduction with RTB x 20  Alt hip extension with RTB x 20  Lateral steps out with RTB in // bars x 2 rounds     Neuro - Red :    Air ex :  Marches x 20    Step up / lateral step up x 20                  THERE EX : 30 MIN   NuStep x 6 min , level 5  Supine :  R/L piriformis stretch ( figure 4 ) x 5 reps each with 10 sec hold   R/L HS stretch with belt OP x 5 reps each with 10 sec hold   R/L heel slides with belt OP x 10 reps each with 10 sec hold   Bridging x 15   R/L SLR with QS x 15 reps each   S/L :  R/L :  Clam shell 15 x 2   Hip abd x 20  Standing :  Gastroc stretch on slant board x 5 reps with 20 sec hold   Alt hip abduction with RTB x 25 reps                   Manual Terex x 15 min   R/L :STM / foam roller over  B  ITB    NU step x 6 min , level 5   MANUAL THERE EX X 20 MIN :  R/L STM/ friction massage / RF roller to lateral / posterior hip and ITB   L hip PROM with gentle end range stretch in all planes x 5 min   THERE EX  X 20 min   Standing in // bars : Alt hip flexion , abd , ext AROM x 20 reps each   Lateral steps in // bars x 2 rounds      Therex: 30'  NuStep L6 x 6'  POC Options/ Discharge Planning  Standing in // bars :   Alt hip flexion, abd, ext AROM x 20 reps each  Lateral steps in // bars x 4 rounds  Standing :  Gastroc stretch on slant board x 5 reps with 20 sec hold  Neuro Re-ed: 10'  Airex balance beam x 6 laps  Stand feet together Airex 4x30\"  R/L SLS balance 2x30\" ea Airex foam with fingertip assist   Therex : 30 min   NU step x 6 min , level 6  Gastroc stretch on slant board x 5 with 20 sec hold   Standing in // bars : Alt hip flex , abd , ext AROM x 25 reps   Lateral steps in // bars x 4 rounds  Neuro Re-ed : 10 min   Airex :  Step up / lateral step up x 10 reps each   SLS R/L : vector   High marches x 20  R/L step over x 10 reps each side   Balance beam : tandem walk / retro tandem walk x 3 rounds   Lateral steps out x 3 rounds   Alt lunges with toes taps on to BOSU x 20  Lateral lunges with foot taps on to BOSU x 20   HEP: Seated HS Stretch, Hip Abduction Isometric with Green TheraBand (TB) in hooklying, Supine or Seated Heel Slides    Charges:  Therex x 2, Neuro x 1      Total Timed Treatment: 40 min  Total Treatment Time: 40 min

## 2023-07-14 ENCOUNTER — APPOINTMENT (OUTPATIENT)
Dept: PHYSICAL THERAPY | Age: 78
End: 2023-07-14
Attending: ORTHOPAEDIC SURGERY
Payer: MEDICARE

## 2023-07-14 ENCOUNTER — TELEPHONE (OUTPATIENT)
Dept: PHYSICAL THERAPY | Facility: HOSPITAL | Age: 78
End: 2023-07-14

## 2023-07-17 ENCOUNTER — HOSPITAL ENCOUNTER (OUTPATIENT)
Dept: GENERAL RADIOLOGY | Age: 78
Discharge: HOME | End: 2023-07-17
Attending: FAMILY MEDICINE
Payer: MEDICARE

## 2023-07-17 ENCOUNTER — OFFICE VISIT (OUTPATIENT)
Dept: FAMILY MEDICINE CLINIC | Facility: CLINIC | Age: 78
End: 2023-07-17
Payer: MEDICARE

## 2023-07-17 ENCOUNTER — TELEPHONE (OUTPATIENT)
Dept: PHYSICAL THERAPY | Age: 78
End: 2023-07-17

## 2023-07-17 ENCOUNTER — MED REC SCAN ONLY (OUTPATIENT)
Dept: FAMILY MEDICINE CLINIC | Facility: CLINIC | Age: 78
End: 2023-07-17

## 2023-07-17 ENCOUNTER — TELEPHONE (OUTPATIENT)
Dept: PHYSICAL THERAPY | Facility: HOSPITAL | Age: 78
End: 2023-07-17

## 2023-07-17 VITALS
BODY MASS INDEX: 33.79 KG/M2 | HEART RATE: 63 BPM | OXYGEN SATURATION: 98 % | SYSTOLIC BLOOD PRESSURE: 120 MMHG | DIASTOLIC BLOOD PRESSURE: 80 MMHG | RESPIRATION RATE: 16 BRPM | WEIGHT: 236 LBS | HEIGHT: 70 IN

## 2023-07-17 DIAGNOSIS — M47.817 LUMBAR AND SACRAL ARTHRITIS: ICD-10-CM

## 2023-07-17 DIAGNOSIS — M17.0 PRIMARY OSTEOARTHRITIS OF BOTH KNEES: ICD-10-CM

## 2023-07-17 DIAGNOSIS — M16.0 BILATERAL HIP JOINT ARTHRITIS: ICD-10-CM

## 2023-07-17 DIAGNOSIS — E11.65 TYPE 2 DIABETES MELLITUS WITH HYPERGLYCEMIA, WITHOUT LONG-TERM CURRENT USE OF INSULIN (HCC): Primary | ICD-10-CM

## 2023-07-17 PROCEDURE — 1159F MED LIST DOCD IN RCRD: CPT | Performed by: FAMILY MEDICINE

## 2023-07-17 PROCEDURE — 99214 OFFICE O/P EST MOD 30 MIN: CPT | Performed by: FAMILY MEDICINE

## 2023-07-17 PROCEDURE — 3074F SYST BP LT 130 MM HG: CPT | Performed by: FAMILY MEDICINE

## 2023-07-17 PROCEDURE — 3008F BODY MASS INDEX DOCD: CPT | Performed by: FAMILY MEDICINE

## 2023-07-17 PROCEDURE — 3079F DIAST BP 80-89 MM HG: CPT | Performed by: FAMILY MEDICINE

## 2023-07-17 PROCEDURE — 1160F RVW MEDS BY RX/DR IN RCRD: CPT | Performed by: FAMILY MEDICINE

## 2023-07-17 PROCEDURE — 73502 X-RAY EXAM HIP UNI 2-3 VIEWS: CPT | Performed by: FAMILY MEDICINE

## 2023-07-18 ENCOUNTER — APPOINTMENT (OUTPATIENT)
Dept: PHYSICAL THERAPY | Age: 78
End: 2023-07-18
Attending: ORTHOPAEDIC SURGERY
Payer: MEDICARE

## 2023-07-19 ENCOUNTER — APPOINTMENT (OUTPATIENT)
Dept: PHYSICAL THERAPY | Age: 78
End: 2023-07-19
Attending: ORTHOPAEDIC SURGERY
Payer: MEDICARE

## 2023-07-21 ENCOUNTER — APPOINTMENT (OUTPATIENT)
Dept: PHYSICAL THERAPY | Age: 78
End: 2023-07-21
Attending: ORTHOPAEDIC SURGERY
Payer: MEDICARE

## 2023-07-21 ENCOUNTER — TELEPHONE (OUTPATIENT)
Dept: ORTHOPEDICS CLINIC | Facility: CLINIC | Age: 78
End: 2023-07-21

## 2023-07-24 ENCOUNTER — OFFICE VISIT (OUTPATIENT)
Dept: ORTHOPEDICS CLINIC | Facility: CLINIC | Age: 78
End: 2023-07-24
Payer: MEDICARE

## 2023-07-24 VITALS — WEIGHT: 236 LBS | HEIGHT: 70 IN | BODY MASS INDEX: 33.79 KG/M2

## 2023-07-24 DIAGNOSIS — I73.9 CLAUDICATION OF BOTH LOWER EXTREMITIES (HCC): Primary | ICD-10-CM

## 2023-07-24 NOTE — PROGRESS NOTES
[facility] Progress Note    Subjective: Patient returns to clinic after last being seen by Dr. Sophia Quick for bilateral knee injections on 6/5/2023. He reports that for the last several months, he has been experiencing bilateral lower extremity leg cramps without any significant hip pain. Cramps occur randomly in various areas of the thigh and lower leg, describing cramps that happen along the medial, anterior, lateral, and posterior thighs. Cramps can happen while at night laying down. He feels the cramps onset if he contracts different muscle groups. He denies any numbness or tingling radiating through the bilateral lower extremities. Denies any low back or buttock pain. Objective: Patient seated comfortably in the exam chair. Alert and oriented x3. Conversational.  Nonlabored breathing. Bilateral hip range of motion fails to increase any significant groin pain. Hip flexion, quadriceps contraction, knee flexion, ankle dorsiflexion, and ankle plantarflexion demonstrate 5/5 strength throughout the bilateral lower extremities. Sensation is intact to light touch throughout the bilateral lower extremities and is symmetric. Imaging: Radiographs of the left hip obtained recently personally viewed, independently interpreted and radiology report read. Radiographs which are nonweightbearing demonstrate moderate joint space narrowing of the left hip. Assessment/Plan: I discussed with the patient that his leg cramps are not consistent with symptomatic hip arthritis. Most likely, he is experiencing cramps secondary to neurogenic claudication, vascular claudication, or electrolyte imbalance leaving him susceptible to muscle cramps. I explained this thoroughly with the patient in detail. I discussed ruling out neurogenic claudication by obtaining an MRI of the lumbar spine. An MRI has been ordered. I can follow-up with the patient after the MRIs been obtained.   Discussed that if compression of the nerves is found that we can consider medication such as gabapentin versus referral for lumbar epidural injections. The patient's questions were sought and answered satisfactorily. He is happy with the plan and will follow as advised. Jeevan Da Silva PA-C  [facility]    This note was dictated using Dragon software. While it was briefly proofread prior to completion, some grammatical, spelling, and word choice errors due to dictation may still occur.

## 2023-07-27 ENCOUNTER — TELEPHONE (OUTPATIENT)
Dept: ORTHOPEDICS CLINIC | Facility: CLINIC | Age: 78
End: 2023-07-27

## 2023-07-27 DIAGNOSIS — I73.9 CLAUDICATION OF BOTH LOWER EXTREMITIES (HCC): Primary | ICD-10-CM

## 2023-07-27 NOTE — TELEPHONE ENCOUNTER
Patient called back to request MRI follow up appt with Shad Scott. Please advise how soon patient can be seen in office for MRI follow up.

## 2023-07-27 NOTE — TELEPHONE ENCOUNTER
Will this be a phone call MRI follow up or should he schedule at next available appointment? Patient requesting a sooner appointment. Results: \"I can follow-up with the patient after the MRIs been obtained.  \"

## 2023-07-31 RX ORDER — GABAPENTIN 300 MG/1
300 CAPSULE ORAL NIGHTLY
Qty: 30 CAPSULE | Refills: 0 | Status: SHIPPED | OUTPATIENT
Start: 2023-07-31

## 2023-07-31 NOTE — TELEPHONE ENCOUNTER
I called and left a VM message for the patient to contact our office back and provide times when he is available to discuss his MRI.

## 2023-07-31 NOTE — TELEPHONE ENCOUNTER
I called and spoke with the patient to review his MRI results. I discussed that at the L5-S1 level, there is moderate to severe bilateral neuroforaminal narrowing which could be contributing partially to his claudication symptoms. I did discuss option between epidural injections and gabapentin. The patient did endorse interest in starting with gabapentin, and a prescription was sent to his pharmacy. I did discuss taking 300 mg capsule at night for 1 week and if no significant improvement then he may increase to 600 mg at night. I did discuss the side effect of drowsiness with the patient. I did encourage him to contact our office in 2 weeks if no improvement was felt with the gabapentin, at which point we can consider epidural injections at the bilateral L5 foramina.

## 2023-08-14 ENCOUNTER — TELEPHONE (OUTPATIENT)
Dept: PHYSICAL THERAPY | Facility: HOSPITAL | Age: 78
End: 2023-08-14

## 2023-10-02 ENCOUNTER — LAB ENCOUNTER (OUTPATIENT)
Dept: LAB | Age: 78
End: 2023-10-02
Attending: FAMILY MEDICINE
Payer: MEDICARE

## 2023-10-02 DIAGNOSIS — E66.01 MORBID (SEVERE) OBESITY DUE TO EXCESS CALORIES (HCC): ICD-10-CM

## 2023-10-02 DIAGNOSIS — E11.65 TYPE 2 DIABETES MELLITUS WITH HYPERGLYCEMIA, WITHOUT LONG-TERM CURRENT USE OF INSULIN (HCC): ICD-10-CM

## 2023-10-02 LAB
ALBUMIN SERPL-MCNC: 3.6 G/DL (ref 3.4–5)
ALBUMIN/GLOB SERPL: 1.1 {RATIO} (ref 1–2)
ALP LIVER SERPL-CCNC: 57 U/L
ALT SERPL-CCNC: 22 U/L
ANION GAP SERPL CALC-SCNC: 5 MMOL/L (ref 0–18)
AST SERPL-CCNC: <3 U/L (ref 15–37)
BILIRUB SERPL-MCNC: 1 MG/DL (ref 0.1–2)
BUN BLD-MCNC: 22 MG/DL (ref 7–18)
CALCIUM BLD-MCNC: 8.5 MG/DL (ref 8.5–10.1)
CHLORIDE SERPL-SCNC: 110 MMOL/L (ref 98–112)
CHOLEST SERPL-MCNC: 110 MG/DL (ref ?–200)
CO2 SERPL-SCNC: 26 MMOL/L (ref 21–32)
CREAT BLD-MCNC: 0.82 MG/DL
CREAT UR-SCNC: 221 MG/DL
EGFRCR SERPLBLD CKD-EPI 2021: 90 ML/MIN/1.73M2 (ref 60–?)
EST. AVERAGE GLUCOSE BLD GHB EST-MCNC: 157 MG/DL (ref 68–126)
FASTING PATIENT LIPID ANSWER: YES
FASTING STATUS PATIENT QL REPORTED: YES
GLOBULIN PLAS-MCNC: 3.4 G/DL (ref 2.8–4.4)
GLUCOSE BLD-MCNC: 110 MG/DL (ref 70–99)
HBA1C MFR BLD: 7.1 % (ref ?–5.7)
HDLC SERPL-MCNC: 39 MG/DL (ref 40–59)
LDLC SERPL CALC-MCNC: 50 MG/DL (ref ?–100)
MICROALBUMIN UR-MCNC: 1.53 MG/DL
MICROALBUMIN/CREAT 24H UR-RTO: 6.9 UG/MG (ref ?–30)
NONHDLC SERPL-MCNC: 71 MG/DL (ref ?–130)
OSMOLALITY SERPL CALC.SUM OF ELEC: 296 MOSM/KG (ref 275–295)
POTASSIUM SERPL-SCNC: 4 MMOL/L (ref 3.5–5.1)
PROT SERPL-MCNC: 7 G/DL (ref 6.4–8.2)
SODIUM SERPL-SCNC: 141 MMOL/L (ref 136–145)
TRIGL SERPL-MCNC: 114 MG/DL (ref 30–149)
VLDLC SERPL CALC-MCNC: 16 MG/DL (ref 0–30)

## 2023-10-02 PROCEDURE — 80061 LIPID PANEL: CPT

## 2023-10-02 PROCEDURE — 36415 COLL VENOUS BLD VENIPUNCTURE: CPT

## 2023-10-02 PROCEDURE — 83036 HEMOGLOBIN GLYCOSYLATED A1C: CPT

## 2023-10-02 PROCEDURE — 82570 ASSAY OF URINE CREATININE: CPT

## 2023-10-02 PROCEDURE — 82043 UR ALBUMIN QUANTITATIVE: CPT

## 2023-10-02 PROCEDURE — 80053 COMPREHEN METABOLIC PANEL: CPT

## 2023-10-13 ENCOUNTER — TELEPHONE (OUTPATIENT)
Dept: FAMILY MEDICINE CLINIC | Facility: CLINIC | Age: 78
End: 2023-10-13

## 2023-10-13 DIAGNOSIS — E11.65 TYPE 2 DIABETES MELLITUS WITH HYPERGLYCEMIA, WITHOUT LONG-TERM CURRENT USE OF INSULIN (HCC): Primary | ICD-10-CM

## 2023-10-13 RX ORDER — METFORMIN HYDROCHLORIDE 750 MG/1
750 TABLET, EXTENDED RELEASE ORAL DAILY
Qty: 90 TABLET | Refills: 0 | Status: SHIPPED | OUTPATIENT
Start: 2023-10-13

## 2023-10-13 NOTE — TELEPHONE ENCOUNTER
----- Message from Dewey Olsen MD sent at 10/6/2023 10:04 AM CDT -----  Patient's sugars have gone back up, needs metformin 750 mg ER, should follow low carb diet, recheck diabetic labs in 3 months.

## 2023-10-13 NOTE — TELEPHONE ENCOUNTER
Called pt and was informed of lab results/recommendations from provider. All questions answered and pt verbalized understanding. Rx sent. Labs ordered.

## 2023-10-18 ENCOUNTER — OFFICE VISIT (OUTPATIENT)
Dept: ORTHOPEDICS CLINIC | Facility: CLINIC | Age: 78
End: 2023-10-18
Payer: MEDICARE

## 2023-10-18 VITALS — BODY MASS INDEX: 33.79 KG/M2 | WEIGHT: 236 LBS | HEIGHT: 70 IN

## 2023-10-18 DIAGNOSIS — M17.0 PRIMARY OSTEOARTHRITIS OF BOTH KNEES: Primary | ICD-10-CM

## 2023-10-18 PROCEDURE — 1125F AMNT PAIN NOTED PAIN PRSNT: CPT | Performed by: PHYSICIAN ASSISTANT

## 2023-10-18 PROCEDURE — 20610 DRAIN/INJ JOINT/BURSA W/O US: CPT | Performed by: PHYSICIAN ASSISTANT

## 2023-10-18 PROCEDURE — 1159F MED LIST DOCD IN RCRD: CPT | Performed by: PHYSICIAN ASSISTANT

## 2023-10-18 PROCEDURE — 3008F BODY MASS INDEX DOCD: CPT | Performed by: PHYSICIAN ASSISTANT

## 2023-10-18 RX ORDER — TRIAMCINOLONE ACETONIDE 40 MG/ML
80 INJECTION, SUSPENSION INTRA-ARTICULAR; INTRAMUSCULAR ONCE
Status: COMPLETED | OUTPATIENT
Start: 2023-10-18 | End: 2023-10-18

## 2023-10-18 RX ADMIN — TRIAMCINOLONE ACETONIDE 80 MG: 40 INJECTION, SUSPENSION INTRA-ARTICULAR; INTRAMUSCULAR at 08:46:00

## 2023-10-18 NOTE — PROCEDURES
Patient reports that his claudication symptoms have nearly fully resolved. He ultimately did not take gabapentin due to the side effect profile. Leaving with his wife soon to the [place] area to visit wife's relatives and mother. After informed consent, the patient's right and left knees were marked, locally anesthetized with skin refrigerant, prepped with topical antiseptic, and injected with a mixture of 1mL 40mg/mL Kenalog, 2mL 1% lidocaine and 2mL 0.5% marcaine through the inferolateral portal.  A band-aid was applied. The patient tolerated the procedure well.     Carlos Alberto Carvajal PA-C  [address] Orthopedic Surgery

## 2023-10-19 NOTE — ED AVS SNAPSHOT
Elba Dior. MRN: [id number]    Department:  [facility] Emergency Department   Date of Visit:  8/14/2017           Disclosure     Insurance plans vary and the physician(s) referred by the ER may not be covered by your plan.  Please co If you have been prescribed any medication(s), please fill your prescription right away and begin taking the medication(s) as directed    If the emergency physician has read X-rays, these will be re-interpreted by a radiologist.  If there is a significant normal/clear to auscultation bilaterally/no wheezes/no rales/no rhonchi

## 2023-11-06 ENCOUNTER — OFFICE VISIT (OUTPATIENT)
Dept: FAMILY MEDICINE CLINIC | Facility: CLINIC | Age: 78
End: 2023-11-06
Payer: COMMERCIAL

## 2023-11-06 VITALS
WEIGHT: 239 LBS | OXYGEN SATURATION: 95 % | SYSTOLIC BLOOD PRESSURE: 110 MMHG | TEMPERATURE: 98 F | HEIGHT: 70 IN | DIASTOLIC BLOOD PRESSURE: 80 MMHG | HEART RATE: 81 BPM | BODY MASS INDEX: 34.22 KG/M2

## 2023-11-06 DIAGNOSIS — H10.31 ACUTE BACTERIAL CONJUNCTIVITIS OF RIGHT EYE: Primary | ICD-10-CM

## 2023-11-06 PROCEDURE — 1159F MED LIST DOCD IN RCRD: CPT | Performed by: PHYSICIAN ASSISTANT

## 2023-11-06 PROCEDURE — 99213 OFFICE O/P EST LOW 20 MIN: CPT | Performed by: PHYSICIAN ASSISTANT

## 2023-11-06 PROCEDURE — 3074F SYST BP LT 130 MM HG: CPT | Performed by: PHYSICIAN ASSISTANT

## 2023-11-06 PROCEDURE — 3008F BODY MASS INDEX DOCD: CPT | Performed by: PHYSICIAN ASSISTANT

## 2023-11-06 PROCEDURE — 3079F DIAST BP 80-89 MM HG: CPT | Performed by: PHYSICIAN ASSISTANT

## 2023-11-06 PROCEDURE — 1160F RVW MEDS BY RX/DR IN RCRD: CPT | Performed by: PHYSICIAN ASSISTANT

## 2023-11-06 RX ORDER — OFLOXACIN 3 MG/ML
SOLUTION/ DROPS OPHTHALMIC
Qty: 10 ML | Refills: 0 | Status: SHIPPED | OUTPATIENT
Start: 2023-11-06

## 2023-11-20 ENCOUNTER — OFFICE VISIT (OUTPATIENT)
Dept: FAMILY MEDICINE CLINIC | Facility: CLINIC | Age: 78
End: 2023-11-20
Payer: COMMERCIAL

## 2023-11-20 VITALS
DIASTOLIC BLOOD PRESSURE: 74 MMHG | RESPIRATION RATE: 16 BRPM | OXYGEN SATURATION: 96 % | TEMPERATURE: 99 F | HEART RATE: 76 BPM | SYSTOLIC BLOOD PRESSURE: 120 MMHG

## 2023-11-20 DIAGNOSIS — J01.80 ACUTE NON-RECURRENT SINUSITIS OF OTHER SINUS: Primary | ICD-10-CM

## 2023-11-20 PROCEDURE — 3074F SYST BP LT 130 MM HG: CPT | Performed by: PHYSICIAN ASSISTANT

## 2023-11-20 PROCEDURE — 3078F DIAST BP <80 MM HG: CPT | Performed by: PHYSICIAN ASSISTANT

## 2023-11-20 PROCEDURE — 1160F RVW MEDS BY RX/DR IN RCRD: CPT | Performed by: PHYSICIAN ASSISTANT

## 2023-11-20 PROCEDURE — 1159F MED LIST DOCD IN RCRD: CPT | Performed by: PHYSICIAN ASSISTANT

## 2023-11-20 PROCEDURE — 99213 OFFICE O/P EST LOW 20 MIN: CPT | Performed by: PHYSICIAN ASSISTANT

## 2023-11-20 RX ORDER — DOXYCYCLINE HYCLATE 100 MG/1
100 CAPSULE ORAL 2 TIMES DAILY
Qty: 20 CAPSULE | Refills: 0 | Status: SHIPPED | OUTPATIENT
Start: 2023-11-20 | End: 2023-11-30

## 2023-11-20 NOTE — PATIENT INSTRUCTIONS
Doxycycline 100 mg twice daily for 10 days. Encourage fluids, humidifier/vaporizor at bedside, elevate head of bed (sleep with extra pillow), vapor rub to chest, steam therapy if no fever, warm compresses for sinus pressure if no fever, salt water gargles for sore throat, lozenges for sore throat, may try over the counter saline nasal spray or irrigation kit (use distilled water with irrigation kit) for sinus pressure/congestion, get plenty of rest.    Follow up with your primary care provider for recheck. Go to immediate care or ER with new or worsening symptoms at any time.

## 2024-01-11 RX ORDER — METFORMIN HYDROCHLORIDE 750 MG/1
750 TABLET, EXTENDED RELEASE ORAL DAILY
Qty: 90 TABLET | Refills: 0 | Status: SHIPPED | OUTPATIENT
Start: 2024-01-11

## 2024-02-02 ENCOUNTER — OFFICE VISIT (OUTPATIENT)
Dept: ORTHOPEDICS CLINIC | Facility: CLINIC | Age: 79
End: 2024-02-02
Payer: COMMERCIAL

## 2024-02-02 VITALS — HEIGHT: 70 IN | BODY MASS INDEX: 34.22 KG/M2 | WEIGHT: 239 LBS

## 2024-02-02 DIAGNOSIS — M17.0 PRIMARY OSTEOARTHRITIS OF BOTH KNEES: Primary | ICD-10-CM

## 2024-02-02 RX ORDER — TRIAMCINOLONE ACETONIDE 40 MG/ML
80 INJECTION, SUSPENSION INTRA-ARTICULAR; INTRAMUSCULAR ONCE
Status: COMPLETED | OUTPATIENT
Start: 2024-02-02 | End: 2024-02-02

## 2024-02-02 RX ADMIN — TRIAMCINOLONE ACETONIDE 80 MG: 40 INJECTION, SUSPENSION INTRA-ARTICULAR; INTRAMUSCULAR at 08:02:00

## 2024-02-02 NOTE — PROCEDURES
Patient had questions about viscosupplementation injections which were answered to his satisfaction.  He would like to try these injections and preferably would like to try them in 2 months from today's date.  Synvisc 1 injections ordered for both knees.  If no improvement from the gel injection we will continue with steroid injections.    After informed consent, the patient's right and left knees were marked, locally anesthetized with skin refrigerant, prepped with topical antiseptic, and injected with a mixture of 1mL 40mg/mL Kenalog, 2mL 1% lidocaine and 2mL 0.5% marcaine through the inferolateral portal.  A band-aid was applied.  The patient tolerated the procedure well.    Wilber Gill PA-C  [facility] Orthopedic Surgery

## 2024-03-18 ENCOUNTER — TELEPHONE (OUTPATIENT)
Dept: FAMILY MEDICINE CLINIC | Facility: CLINIC | Age: 79
End: 2024-03-18

## 2024-03-18 DIAGNOSIS — I10 PRIMARY HYPERTENSION: ICD-10-CM

## 2024-03-18 DIAGNOSIS — Z85.46 PERSONAL HISTORY OF MALIGNANT NEOPLASM OF PROSTATE: Primary | ICD-10-CM

## 2024-03-18 DIAGNOSIS — I42.0 DILATED CARDIOMYOPATHY (HCC): ICD-10-CM

## 2024-03-18 DIAGNOSIS — E11.65 TYPE 2 DIABETES MELLITUS WITH HYPERGLYCEMIA, WITHOUT LONG-TERM CURRENT USE OF INSULIN (HCC): ICD-10-CM

## 2024-03-20 ENCOUNTER — OFFICE VISIT (OUTPATIENT)
Dept: ORTHOPEDICS CLINIC | Facility: CLINIC | Age: 79
End: 2024-03-20
Payer: COMMERCIAL

## 2024-03-20 VITALS — HEIGHT: 70 IN | BODY MASS INDEX: 34.22 KG/M2 | WEIGHT: 239 LBS

## 2024-03-20 DIAGNOSIS — M17.0 PRIMARY OSTEOARTHRITIS OF BOTH KNEES: Primary | ICD-10-CM

## 2024-03-20 PROCEDURE — 20610 DRAIN/INJ JOINT/BURSA W/O US: CPT | Performed by: PHYSICIAN ASSISTANT

## 2024-03-20 NOTE — PROCEDURES
After informed consent, the patient's right and left knees were marked, locally anesthetized with skin refrigerant, prepped with topical antiseptic, and injected with 6mL of 48mg/6mL Synvisc One through the inferolateral portal.  A band-aid was applied.  The patient tolerated the procedure well.    Wilber Gill PA-C  [facility] Orthopedic Surgery

## 2024-03-22 ENCOUNTER — LAB ENCOUNTER (OUTPATIENT)
Dept: LAB | Age: 79
End: 2024-03-22
Attending: FAMILY MEDICINE
Payer: MEDICARE

## 2024-03-22 ENCOUNTER — TELEPHONE (OUTPATIENT)
Dept: FAMILY MEDICINE CLINIC | Facility: CLINIC | Age: 79
End: 2024-03-22

## 2024-03-22 DIAGNOSIS — Z85.46 PERSONAL HISTORY OF MALIGNANT NEOPLASM OF PROSTATE: ICD-10-CM

## 2024-03-22 DIAGNOSIS — I42.0 DILATED CARDIOMYOPATHY (HCC): ICD-10-CM

## 2024-03-22 DIAGNOSIS — I10 PRIMARY HYPERTENSION: ICD-10-CM

## 2024-03-22 DIAGNOSIS — E11.65 TYPE 2 DIABETES MELLITUS WITH HYPERGLYCEMIA, WITHOUT LONG-TERM CURRENT USE OF INSULIN (HCC): ICD-10-CM

## 2024-03-22 LAB
ALBUMIN SERPL-MCNC: 3.7 G/DL (ref 3.4–5)
ALBUMIN/GLOB SERPL: 1.2 {RATIO} (ref 1–2)
ALP LIVER SERPL-CCNC: 46 U/L
ALT SERPL-CCNC: 21 U/L
ANION GAP SERPL CALC-SCNC: 4 MMOL/L (ref 0–18)
AST SERPL-CCNC: 10 U/L (ref 15–37)
BASOPHILS # BLD AUTO: 0.04 X10(3) UL (ref 0–0.2)
BASOPHILS NFR BLD AUTO: 0.6 %
BILIRUB SERPL-MCNC: 0.7 MG/DL (ref 0.1–2)
BUN BLD-MCNC: 22 MG/DL (ref 9–23)
CALCIUM BLD-MCNC: 9.3 MG/DL (ref 8.5–10.1)
CHLORIDE SERPL-SCNC: 111 MMOL/L (ref 98–112)
CHOLEST SERPL-MCNC: 136 MG/DL (ref ?–200)
CO2 SERPL-SCNC: 27 MMOL/L (ref 21–32)
CREAT BLD-MCNC: 1 MG/DL
CREAT UR-SCNC: 101 MG/DL
EGFRCR SERPLBLD CKD-EPI 2021: 77 ML/MIN/1.73M2 (ref 60–?)
EOSINOPHIL # BLD AUTO: 0.11 X10(3) UL (ref 0–0.7)
EOSINOPHIL NFR BLD AUTO: 1.8 %
ERYTHROCYTE [DISTWIDTH] IN BLOOD BY AUTOMATED COUNT: 13.2 %
EST. AVERAGE GLUCOSE BLD GHB EST-MCNC: 151 MG/DL (ref 68–126)
FASTING PATIENT LIPID ANSWER: YES
FASTING STATUS PATIENT QL REPORTED: YES
GLOBULIN PLAS-MCNC: 3.1 G/DL (ref 2.8–4.4)
GLUCOSE BLD-MCNC: 114 MG/DL (ref 70–99)
HBA1C MFR BLD: 6.9 % (ref ?–5.7)
HCT VFR BLD AUTO: 47.2 %
HDLC SERPL-MCNC: 44 MG/DL (ref 40–59)
HGB BLD-MCNC: 15.3 G/DL
IMM GRANULOCYTES # BLD AUTO: 0.01 X10(3) UL (ref 0–1)
IMM GRANULOCYTES NFR BLD: 0.2 %
LDLC SERPL CALC-MCNC: 77 MG/DL (ref ?–100)
LYMPHOCYTES # BLD AUTO: 1.54 X10(3) UL (ref 1–4)
LYMPHOCYTES NFR BLD AUTO: 24.7 %
MCH RBC QN AUTO: 30.6 PG (ref 26–34)
MCHC RBC AUTO-ENTMCNC: 32.4 G/DL (ref 31–37)
MCV RBC AUTO: 94.4 FL
MICROALBUMIN UR-MCNC: 0.8 MG/DL
MICROALBUMIN/CREAT 24H UR-RTO: 7.9 UG/MG (ref ?–30)
MONOCYTES # BLD AUTO: 0.48 X10(3) UL (ref 0.1–1)
MONOCYTES NFR BLD AUTO: 7.7 %
NEUTROPHILS # BLD AUTO: 4.06 X10 (3) UL (ref 1.5–7.7)
NEUTROPHILS # BLD AUTO: 4.06 X10(3) UL (ref 1.5–7.7)
NEUTROPHILS NFR BLD AUTO: 65 %
NONHDLC SERPL-MCNC: 92 MG/DL (ref ?–130)
OSMOLALITY SERPL CALC.SUM OF ELEC: 298 MOSM/KG (ref 275–295)
PLATELET # BLD AUTO: 180 10(3)UL (ref 150–450)
POTASSIUM SERPL-SCNC: 4.8 MMOL/L (ref 3.5–5.1)
PROT SERPL-MCNC: 6.8 G/DL (ref 6.4–8.2)
PSA SERPL-MCNC: 1.38 NG/ML (ref ?–4)
RBC # BLD AUTO: 5 X10(6)UL
SODIUM SERPL-SCNC: 142 MMOL/L (ref 136–145)
TRIGL SERPL-MCNC: 76 MG/DL (ref 30–149)
TSI SER-ACNC: 1.57 MIU/ML (ref 0.36–3.74)
VLDLC SERPL CALC-MCNC: 12 MG/DL (ref 0–30)
WBC # BLD AUTO: 6.2 X10(3) UL (ref 4–11)

## 2024-03-22 PROCEDURE — 80053 COMPREHEN METABOLIC PANEL: CPT

## 2024-03-22 PROCEDURE — 80061 LIPID PANEL: CPT

## 2024-03-22 PROCEDURE — 85025 COMPLETE CBC W/AUTO DIFF WBC: CPT

## 2024-03-22 PROCEDURE — 83036 HEMOGLOBIN GLYCOSYLATED A1C: CPT

## 2024-03-22 PROCEDURE — 82570 ASSAY OF URINE CREATININE: CPT

## 2024-03-22 PROCEDURE — 82043 UR ALBUMIN QUANTITATIVE: CPT

## 2024-03-22 PROCEDURE — 84443 ASSAY THYROID STIM HORMONE: CPT

## 2024-03-22 PROCEDURE — 36415 COLL VENOUS BLD VENIPUNCTURE: CPT

## 2024-03-22 PROCEDURE — 84153 ASSAY OF PSA TOTAL: CPT

## 2024-03-25 ENCOUNTER — OFFICE VISIT (OUTPATIENT)
Dept: FAMILY MEDICINE CLINIC | Facility: CLINIC | Age: 79
End: 2024-03-25
Payer: COMMERCIAL

## 2024-03-25 VITALS
HEIGHT: 70 IN | OXYGEN SATURATION: 92 % | WEIGHT: 237 LBS | RESPIRATION RATE: 12 BRPM | DIASTOLIC BLOOD PRESSURE: 72 MMHG | TEMPERATURE: 98 F | HEART RATE: 83 BPM | BODY MASS INDEX: 33.93 KG/M2 | SYSTOLIC BLOOD PRESSURE: 126 MMHG

## 2024-03-25 DIAGNOSIS — K63.5 POLYP OF COLON, UNSPECIFIED PART OF COLON, UNSPECIFIED TYPE: ICD-10-CM

## 2024-03-25 DIAGNOSIS — E66.01 MORBID (SEVERE) OBESITY DUE TO EXCESS CALORIES (HCC): ICD-10-CM

## 2024-03-25 DIAGNOSIS — G89.29 CHRONIC RIGHT SHOULDER PAIN: ICD-10-CM

## 2024-03-25 DIAGNOSIS — I44.7 LEFT BUNDLE BRANCH BLOCK: ICD-10-CM

## 2024-03-25 DIAGNOSIS — N40.1 BENIGN NON-NODULAR PROSTATIC HYPERPLASIA WITH LOWER URINARY TRACT SYMPTOMS: ICD-10-CM

## 2024-03-25 DIAGNOSIS — M75.41 IMPINGEMENT SYNDROME OF RIGHT SHOULDER: ICD-10-CM

## 2024-03-25 DIAGNOSIS — G47.33 OSA ON CPAP: ICD-10-CM

## 2024-03-25 DIAGNOSIS — N20.0 RECURRENT KIDNEY STONES: ICD-10-CM

## 2024-03-25 DIAGNOSIS — K76.89 HEPATIC CYST: ICD-10-CM

## 2024-03-25 DIAGNOSIS — M17.10 PRIMARY LOCALIZED OSTEOARTHROSIS OF LOWER LEG, UNSPECIFIED LATERALITY: ICD-10-CM

## 2024-03-25 DIAGNOSIS — I10 PRIMARY HYPERTENSION: ICD-10-CM

## 2024-03-25 DIAGNOSIS — I42.0 DILATED CARDIOMYOPATHY (HCC): ICD-10-CM

## 2024-03-25 DIAGNOSIS — M19.012 OSTEOARTHRITIS OF LEFT GLENOHUMERAL JOINT: ICD-10-CM

## 2024-03-25 DIAGNOSIS — M25.511 CHRONIC RIGHT SHOULDER PAIN: ICD-10-CM

## 2024-03-25 DIAGNOSIS — K76.0 FATTY INFILTRATION OF LIVER: ICD-10-CM

## 2024-03-25 DIAGNOSIS — J42 CHRONIC BRONCHITIS, UNSPECIFIED CHRONIC BRONCHITIS TYPE (HCC): ICD-10-CM

## 2024-03-25 DIAGNOSIS — E11.65 TYPE 2 DIABETES MELLITUS WITH HYPERGLYCEMIA, WITHOUT LONG-TERM CURRENT USE OF INSULIN (HCC): ICD-10-CM

## 2024-03-25 DIAGNOSIS — N28.1 BILATERAL RENAL CYSTS: ICD-10-CM

## 2024-03-25 DIAGNOSIS — Z00.00 MEDICARE ANNUAL WELLNESS VISIT, SUBSEQUENT: Primary | ICD-10-CM

## 2024-03-25 PROCEDURE — 99214 OFFICE O/P EST MOD 30 MIN: CPT | Performed by: FAMILY MEDICINE

## 2024-03-25 PROCEDURE — 96160 PT-FOCUSED HLTH RISK ASSMT: CPT | Performed by: FAMILY MEDICINE

## 2024-03-25 RX ORDER — METFORMIN HYDROCHLORIDE 750 MG/1
750 TABLET, EXTENDED RELEASE ORAL DAILY
Qty: 90 TABLET | Refills: 2 | Status: SHIPPED | OUTPATIENT
Start: 2024-03-25 | End: 2024-06-23

## 2024-03-25 NOTE — PROGRESS NOTES
Subjective:   Son Basurto Jr. is a 78 year old male who presents for a MA (Medicare Advantage) Supervisit (Once per calendar year) and scheduled follow up of multiple significant but stable problems.   Patient's presenting for diabetes check.  Patient has been compliant with medication and diet.  Patient's last OPTHO exam was less than 1 year ago.  He reports no foot ulcers and reports normal sensation to lower extremities.   He reports his sugars have been running in 120-140's  He denies any hypoglycemic episodes and reports no urinary complaints.    Patient has history of OA of both knees-non surgical and would prefer to HEP, did do PT in the past.     History/Other:   Fall Risk Assessment:   He has been screened for Falls and is low risk.      Cognitive Assessment:   He had a completely normal cognitive assessment - see flowsheet entries     Functional Ability/Status:   Son Basurto Jr. has some abnormal functions as listed below:  He has Vision problems based on screening of functional status.       Depression Screening (PHQ-2/PHQ-9): PHQ-2 SCORE: 0  , done 3/25/2024             Advanced Directives:   He does NOT have a Living Will. [Do you have a living will?: No]  He does NOT have a Power of  for Health Care. [Do you have a healthcare power of ?: No]  Discussed Advance Care Planning with patient (and family/surrogate if present). Standard forms made available to patient in After Visit Summary.      Patient Active Problem List   Diagnosis    Primary localized osteoarthrosis, lower leg    Cardiomyopathy (HCC)    Left bundle branch block    HTN (hypertension)    Colon polyp    Hepatic cyst    Bilateral renal cysts    Fatty infiltration of liver    GIBRAN on CPAP    Recurrent kidney stones    Benign non-nodular prostatic hyperplasia with lower urinary tract symptoms    Osteoarthritis of left glenohumeral joint    Chronic right shoulder pain    Impingement syndrome of right  shoulder    Type 2 diabetes mellitus with hyperglycemia, without long-term current use of insulin (HCC)    Chronic bronchitis, unspecified chronic bronchitis type (HCC)    Morbid (severe) obesity due to excess calories (HCC)     Allergies:  He is allergic to neosporin original [neomycin-bacitracin-polymyxin], other, augmentin [amoxicillin-pot clavulanate], and neomycin.    Current Medications:  Outpatient Medications Marked as Taking for the 3/25/24 encounter (Office Visit) with Andrew Dobbins MD   Medication Sig    metFORMIN  MG Oral Tablet 24 Hr Take 1 tablet (750 mg total) by mouth daily.    ofloxacin 0.3 % Ophthalmic Solution 1-2 drops in right  eye every 4 hours for 2 days, then 4 times daily for 5 days    gabapentin 300 MG Oral Cap Take 1 capsule (300 mg total) by mouth nightly. If no improvement after 1 week, may take 2 capsules at night.    Meloxicam 15 MG Oral Tab Take 1 tablet (15 mg total) by mouth daily.    Clobetasol Propionate 0.05 % External Ointment Apply a small amount to the affected area twice a day for a week    clobetasol 0.05 % External Cream Apply to the affected area behind the knee twice a day for two weeks    carvedilol 12.5 MG Oral Tab Take 1 tablet (12.5 mg total) by mouth 2 (two) times daily with meals.    Ipratropium Bromide 0.06 % Nasal Solution 2 sprays by Nasal route 4 (four) times daily.    "[employer] MICROLET LANCETS Does not apply Misc TEST ONCE DAILY    CONTOUR NEXT TEST In Vitro Strip TEST ONCE DAILY    Cholecalciferol (VITAMIN D3) 3000 units Oral Tab Take 3,000 Units by mouth nightly.    B Complex Vitamins (B COMPLEX OR) Take 1 tablet by mouth daily.      Acetaminophen  MG Oral Tab CR Take 1 tablet (650 mg total) by mouth every 8 (eight) hours as needed for Pain.       Medical History:  He  has a past medical history of Abdominal pain (9/18/2013), Abnormal chest sounds (2013), Anxiety state, unspecified (6/29/2012), Arthropathy, unspecified, site unspecified (6/29/2012),  Benign neoplasm of colon (5/28/2013), Bilateral renal cysts (3/14/2013), Candidiasis of skin (10/24/2013), Cardiomyopathy (HCC), Cellulitis and abscess of face (6/29/2012), Change in stool (9/18/2013), Colon polyp (5/2/2013), Constipation (9/18/2013), Diarrhea (9/18/2013), Diverticulitis (4/16/2013), Diverticulitis large intestine (7/24/2013), Diverticulosis (5/2/2013), Dizziness and giddiness (6/29/2012), Dyspnea, Fatty infiltration of liver (3/14/2013), Glucose intolerance (pre-diabetes), Hepatic cyst (3/14/2013), History of blood transfusion (2001), HTN (hypertension) (11/16/2013), Internal hemorrhoids (5/2/2013), Kidney stone, Laboratory examination ordered as part of a routine general medical examination (6/29/2012), Left bundle branch block (11/14/2013), Left inguinal hernia (3/14/2013), Morbid obesity (HCC) (4/16/2013), Osteoarthrosis, unspecified whether generalized or localized, unspecified site, Other seborrheic keratosis (6/29/2012), Perforated sigmoid colon (HCC) (11/14/2013), Perforated viscus (11/14/2013), Plantar wart (6/29/2012), Pleural effusion (11/25/2013), Pneumonia, organism unspecified(486) (1995), Prediabetes, Primary localized osteoarthrosis, lower leg (5/23/2012), Screening for iron deficiency anemia (6/29/2012), Screening for lipoid disorders (6/29/2012), Screening for other and unspecified endocrine, nutritional, metabolic, and immunity disorders (6/29/2012), Sepsis (HCC) (11/14/2013), Shortness of breath, Special screening for malignant neoplasm of prostate (6/29/2012), Stricture of sigmoid colon (HCC) (11/14/2013), Unspecified intestinal obstruction (10/24/2013), Unspecified sleep apnea, Ventral hernia, unspecified, without mention of obstruction or gangrene (6/29/2012), Visual impairment, Wound hematoma (7/26/2013), and Wound infection (11/25/2013).  Surgical History:  He  has a past surgical history that includes orthopedic surg (pbp); i&d rectal submucosal abscess; angiogram  (13); colonoscopy (2013, 10/1/2013, 2013); repair of hydrocele,tunica (); part removal colon w end colostomy (); Ventral hernia repair (10/3/2014); Colectomy; colonoscopy; Ventral hernia repair (N/A, 2015); other surgical history; other surgical history; other surgical history; other surgical history (2013); other surgical history (2013); other surgical history (2013); other surgical history (3/5/2014); other surgical history (3/17/16); other surgical history (3/21/16); other surgical history (3/22/16); Ventral hernia repair (N/A, 2017); hernia surgery; hernia surgery (); and hernia surgery (2017).   Family History:  His family history includes Diabetes in his brother, brother, and mother; Hypertension in his brother; Other in his father.  Social History:  He  reports that he quit smoking about 49 years ago. His smoking use included cigarettes. He has a 12 pack-year smoking history. He has never used smokeless tobacco. He reports current alcohol use of about 1.0 standard drink of alcohol per week. He reports that he does not use drugs.    Tobacco:  He smoked tobacco in the past but quit greater than 12 months ago.  Social History    Tobacco Use      Smoking status: Former        Packs/day: 1.00        Years: 12.00        Additional pack years: 0.00        Total pack years: 12.00        Types: Cigarettes        Quit date: 1975        Years since quittin.2      Smokeless tobacco: Never       CAGE Alcohol Screen:   CAGE screening score of 0 on 3/25/2024, showing low risk of alcohol abuse.      Patient Care Team:  Andrew Dobbins MD as PCP - General (Family Medicine)  Saeed Ambrocio MD (CARDIOLOGY)  Juana Velez PT as Physical Therapist  Roxane Medellin PTA as Physical Therapy Assistant (Physical Therapy)    Review of Systems  GENERAL: feels well otherwise  SKIN: denies any unusual skin lesions  EYES: denies blurred vision or double vision  HEENT:  denies nasal congestion, sinus pain or ST  LUNGS: denies shortness of breath with exertion  CARDIOVASCULAR: denies chest pain on exertion  GI: denies abdominal pain, denies heartburn  : 1 per night nocturia, no complaint of urinary incontinence  MUSCULOSKELETAL: denies back pain  NEURO: denies headaches  PSYCHE: denies depression or anxiety  HEMATOLOGIC: denies hx of anemia  ENDOCRINE: denies thyroid history  ALL/ASTHMA: denies hx of allergy or asthma    Objective:   Physical Exam  General Appearance:  Alert, cooperative, no distress, appears stated age   Head:  Normocephalic, without obvious abnormality, atraumatic   Eyes:  PERRL, conjunctiva/corneas clear, EOM's intact, both eyes   Ears:  Normal TM's and external ear canals, both ears   Nose: Nares normal, septum midline, mucosa normal, no drainage or sinus tenderness   Throat: Lips, mucosa, and tongue normal; teeth and gums normal   Neck: Supple, symmetrical, trachea midline, no adenopathy, thyroid: not enlarged, symmetric, no tenderness/mass/nodules, no carotid bruit or JVD   Back:   Symmetric, no curvature, ROM normal, no CVA tenderness   Lungs:   Clear to auscultation bilaterally, respirations unlabored   Chest Wall:  No tenderness or deformity   Heart:  Regular rate and rhythm, S1, S2 normal, no murmur, rub or gallop   Abdomen:   Soft, non-tender, bowel sounds active all four quadrants,  no masses, no organomegaly           Extremities: Extremities normal, atraumatic, no cyanosis or edema   Pulses: 2+ and symmetric   Skin: Skin color, texture, turgor normal, no rashes or lesions   Lymph nodes: Cervical, supraclavicular, and axillary nodes normal   Neurologic: Normal     /72   Pulse 83   Temp 97.7 °F (36.5 °C)   Resp 12   Ht 5' 10\" (1.778 m)   Wt 237 lb (107.5 kg)   SpO2 92%   BMI 34.01 kg/m²  Estimated body mass index is 34.01 kg/m² as calculated from the following:    Height as of this encounter: 5' 10\" (1.778 m).    Weight as of this  encounter: 237 lb (107.5 kg).    Medicare Hearing Assessment:  Hearing screening:   Form of screen: whisper testing at 5 feet  Negative for hearing impairment.      Visual Acuity:   Right Eye Visual Acuity: Corrected Right Eye Chart Acuity: 20/30   Left Eye Visual Acuity: Corrected Left Eye Chart Acuity: 20/30   Both Eyes Visual Acuity: Corrected Both Eyes Chart Acuity: 20/20   Able To Tolerate Visual Acuity: Yes        Assessment & Plan:   Son Basurto Jr. is a 78 year old male who presents for a Medicare Assessment.   1. Medicare annual wellness visit, subsequent  -supervisit was done    2. Morbid (severe) obesity due to excess calories (HCC)  -improving his diet, less sweets and low carb    3. Chronic bronchitis, unspecified chronic bronchitis type (HCC)  -stable, CPm    4. Type 2 diabetes mellitus with hyperglycemia, without long-term current use of insulin (HCC)  -will CPM, recheck labs in 3-6 months.   - metFORMIN  MG Oral Tablet 24 Hr; Take 1 tablet (750 mg total) by mouth daily.  Dispense: 90 tablet; Refill: 2    5. Impingement syndrome of right shoulder  -stable, CPm    6. Benign non-nodular prostatic hyperplasia with lower urinary tract symptoms  -stable, CPM    7. Recurrent kidney stones  -stable, CPM    8. GIBRAN on CPAP  -stable, CPm    9. Osteoarthritis of left glenohumeral joint  -stable, CPm    10. Chronic right shoulder pain  -stable, CPM    11. Fatty infiltration of liver  -stable, as above. CPM    12. Bilateral renal cysts  -stable CPM    13. Hepatic cyst  -stable, CPM    14. Polyp of colon, unspecified part of colon, unspecified type  -stable, UTD, CPM    15. Primary hypertension  -stable, CPM    16. Left bundle branch block  -stable, CPM    17. Dilated cardiomyopathy (HCC)  -stable, CPM    18. Primary localized osteoarthrosis of lower leg, unspecified laterality  -stable, CPM.    The patient indicates understanding of these issues and agrees to the plan.  Continue with current  treatment plan.  Reinforced healthy diet, lifestyle, and exercise.      Andrew Dobbins MD, 3/25/2024     Supplementary Documentation:   General Health:  In the past six months, have you lost more than 10 pounds without trying?: 2 - No  Has your appetite been poor?: No  Type of Diet: Balanced  How does the patient maintain a good energy level?: Appropriate Exercise  How would you describe your daily physical activity?: Moderate  How would you describe your current health state?: Good  How do you maintain positive mental well-being?: Social Interaction;Puzzles  On a scale of 0 to 10, with 0 being no pain and 10 being severe pain, what is your pain level?: 0 - (None)  In the past six months, have you experienced urine leakage?: 0-No  At any time do you feel concerned for the safety/well-being of yourself and/or your children, in your home or elsewhere?: No  Have you had any immunizations at another office such as Influenza, Hepatitis B, Tetanus, or Pneumococcal?: No        Son Basurto Jr.'s SCREENING SCHEDULE   Tests on this list are recommended by your physician but may not be covered, or covered at this frequency, by your insurer.   Please check with your insurance carrier before scheduling to verify coverage.   PREVENTATIVE SERVICES FREQUENCY &  COVERAGE DETAILS LAST COMPLETION DATE   Diabetes Screening    Fasting Blood Sugar / Glucose    One screening every 12 months if never tested or if previously tested but not diagnosed with pre-diabetes   One screening every 6 months if diagnosed with pre-diabetes Lab Results   Component Value Date    GLUCOSE 79 09/04/2013     (H) 03/22/2024        Cardiovascular Disease Screening    Lipid Panel  Cholesterol  Lipoprotein (HDL)  Triglycerides Covered every 5 years for all Medicare beneficiaries without apparent signs or symptoms of cardiovascular disease Lab Results   Component Value Date    CHOLEST 136 03/22/2024    HDL 44 03/22/2024    LDL 77 03/22/2024     TRIG 76 03/22/2024         Electrocardiogram (EKG)   Covered if needed at Welcome to Medicare, and non-screening if indicated for medical reasons 07/30/2021      Ultrasound Screening for Abdominal Aortic Aneurysm (AAA) Covered once in a lifetime for one of the following risk factors    Men who are 65-75 years old and have ever smoked    Anyone with a family history -     Colorectal Cancer Screening  Covered for ages 50-85; only need ONE of the following:    Colonoscopy   Covered every 10 years    Covered every 2 years if patient is at high risk or previous colonoscopy was abnormal -    No recommendations at this time    Flexible Sigmoidoscopy   Covered every 4 years -    Fecal Occult Blood Test Covered annually -   Prostate Cancer Screening    Prostate-Specific Antigen (PSA) Annually Lab Results   Component Value Date    PSA 1.38 03/22/2024     There are no preventive care reminders to display for this patient.   Immunizations    Influenza Covered once per flu season  Please get every year 10/02/2023  No recommendations at this time    Pneumococcal Each vaccine ([id number] & [id number]) covered once after 65 Prevnar 13: 12/04/2018    [id number]: 03/04/2020     No recommendations at this time    Hepatitis B One screening covered for patients with certain risk factors   -  No recommendations at this time    Tetanus Toxoid Not covered by Medicare Part B unless medically necessary (cut with metal); may be covered with your pharmacy prescription benefits -    Tetanus, Diptheria and Pertusis TD and TDaP Not covered by Medicare Part B -  No recommendations at this time    Zoster Not covered by Medicare Part B; may be covered with your pharmacy  prescription benefits 07/02/2020  Zoster Vaccines(2 of 3) due on 08/27/2020     Diabetes      Hemoglobin A1C Annually; if last result is elevated, may be asked to retest more frequently.    Medicare covers every 3 months Lab Results   Component Value Date     (H) 03/22/2024     A1C 6.9 (H) 03/22/2024       No recommendations at this time    Creat/alb ratio Annually Lab Results   Component Value Date    MICROALBCREA 7.9 03/22/2024       LDL Annually Lab Results   Component Value Date    LDL 77 03/22/2024       Dilated Eye Exam Annually Last Diabetic Eye Exam:  No data recorded  No data recorded       Annual Monitoring of Persistent Medications (ACE/ARB, digoxin diuretics, anticonvulsants)    Potassium Annually Lab Results   Component Value Date    K 4.8 03/22/2024         Creatinine   Annually Lab Results   Component Value Date    CREATSERUM 1.00 03/22/2024         BUN Annually Lab Results   Component Value Date    BUN 22 03/22/2024       Drug Serum Conc Annually No results found for: \"DIGOXIN\", \"DIG\", \"VALP\"           Chronic Obstructive Pulmonary Disease (COPD)    Spirometry Annually Spirometry date:

## 2024-05-23 VITALS — HEIGHT: 70 IN | BODY MASS INDEX: 34.36 KG/M2 | WEIGHT: 240 LBS

## 2024-05-23 NOTE — PAT NURSING NOTE
PreOp Instructions     You are scheduled for: a Cardiac Procedure     Date of Procedure: 05/29/24 Wednesday     Diet Instructions: Do not eat or drink anything after midnight     Medications: Take Aspirin 81 mg x 4 tablets the day of your procedure, Medications you are allowed to take can be taken with a sip of water the morning of your procedure     Medications to Stop: Hold herbal supplements and vitamins morning of procedure    Diabetic Instructions: Do not take morning dose of your diabetic medications, Metformin needs to be held 24 hours prior to procedure, your last dose should be the morning dose the day before procedure, If you wear a CGM (continuous glucose monitor), you will need to remove the entire device (sensor/transmitter) and leave at home prior to your procedure    Sleep Apnea: If you have sleep apnea, please bring your mask and tubing     Skin Prep: Shower with antibacterial soap using a clean washcloth, prior to procedure, clean wrists and groin areas well     Arrival Time: The day prior to your procedure you will receive a phone call before 6:00 pm with your arrival time. If you haven't received a phone call, please check your voicemail messages., If you did not receive a voice mail and it is after 6:00 pm, please call the nursing supervisor at [phone].    Driving After Procedure: If sedation is given, you WILL NOT be able to drive home. You will need a responsible adult  to drive you home., Cannot take uber or cab unless approved by physician     Discharge Teaching: Your nurse will give you specific instructions before discharge, Most people can resume normal activities in 2-3 days, Any questions, please call the physician's office      parking is available starting at 6 am or park in the [facility] parking garage at [facility]. Check in at the [facility] reception desk. Our  will be there to check you in for your procedure. Please bring your  insurance cards and ID with you.                                                                                                                                      Please DO NOT respond to this message, the inbasket is not monitored for messages. For any questions, please call the physician's office.

## 2024-05-28 ENCOUNTER — HOSPITAL ENCOUNTER (OUTPATIENT)
Dept: INTERVENTIONAL RADIOLOGY/VASCULAR | Facility: HOSPITAL | Age: 79
Discharge: HOME | End: 2024-05-28
Attending: INTERNAL MEDICINE | Admitting: INTERNAL MEDICINE

## 2024-05-28 VITALS
OXYGEN SATURATION: 97 % | HEART RATE: 72 BPM | RESPIRATION RATE: 21 BRPM | SYSTOLIC BLOOD PRESSURE: 111 MMHG | DIASTOLIC BLOOD PRESSURE: 58 MMHG | TEMPERATURE: 98 F

## 2024-05-28 DIAGNOSIS — R94.39 ABNORMAL STRESS TEST: ICD-10-CM

## 2024-05-28 LAB — GLUCOSE BLD-MCNC: 116 MG/DL (ref 70–99)

## 2024-05-28 PROCEDURE — B2111ZZ FLUOROSCOPY OF MULTIPLE CORONARY ARTERIES USING LOW OSMOLAR CONTRAST: ICD-10-PCS | Performed by: INTERNAL MEDICINE

## 2024-05-28 PROCEDURE — B2151ZZ FLUOROSCOPY OF LEFT HEART USING LOW OSMOLAR CONTRAST: ICD-10-PCS | Performed by: INTERNAL MEDICINE

## 2024-05-28 PROCEDURE — 93460 R&L HRT ART/VENTRICLE ANGIO: CPT | Performed by: INTERNAL MEDICINE

## 2024-05-28 PROCEDURE — 99152 MOD SED SAME PHYS/QHP 5/>YRS: CPT | Performed by: INTERNAL MEDICINE

## 2024-05-28 PROCEDURE — 4A023N8 MEASUREMENT OF CARDIAC SAMPLING AND PRESSURE, BILATERAL, PERCUTANEOUS APPROACH: ICD-10-PCS | Performed by: INTERNAL MEDICINE

## 2024-05-28 PROCEDURE — 82962 GLUCOSE BLOOD TEST: CPT

## 2024-05-28 RX ORDER — SODIUM CHLORIDE 9 MG/ML
INJECTION, SOLUTION INTRAVENOUS
Status: COMPLETED | OUTPATIENT
Start: 2024-05-28 | End: 2024-05-28

## 2024-05-28 RX ORDER — HEPARIN SODIUM 5000 [USP'U]/ML
INJECTION, SOLUTION INTRAVENOUS; SUBCUTANEOUS
Status: COMPLETED
Start: 2024-05-28 | End: 2024-05-28

## 2024-05-28 RX ORDER — LIDOCAINE HYDROCHLORIDE 10 MG/ML
INJECTION, SOLUTION EPIDURAL; INFILTRATION; INTRACAUDAL; PERINEURAL
Status: COMPLETED
Start: 2024-05-28 | End: 2024-05-28

## 2024-05-28 RX ORDER — NITROGLYCERIN 20 MG/100ML
INJECTION INTRAVENOUS
Status: COMPLETED
Start: 2024-05-28 | End: 2024-05-28

## 2024-05-28 RX ORDER — MIDAZOLAM HYDROCHLORIDE 1 MG/ML
INJECTION INTRAMUSCULAR; INTRAVENOUS
Status: COMPLETED
Start: 2024-05-28 | End: 2024-05-28

## 2024-05-28 RX ORDER — VERAPAMIL HYDROCHLORIDE 2.5 MG/ML
INJECTION, SOLUTION INTRAVENOUS
Status: COMPLETED
Start: 2024-05-28 | End: 2024-05-28

## 2024-05-28 RX ORDER — MIDAZOLAM HYDROCHLORIDE 1 MG/ML
INJECTION INTRAMUSCULAR; INTRAVENOUS
Status: DISCONTINUED
Start: 2024-05-28 | End: 2024-05-28 | Stop reason: WASHOUT

## 2024-05-28 RX ADMIN — SODIUM CHLORIDE: 9 INJECTION, SOLUTION INTRAVENOUS at 07:48:00

## 2024-05-28 NOTE — PROCEDURES
[facility]       Son Basurto Location: Cath Lab    [facility] [id number] MRN [id number]   Admission Date 5/28/2024 Procedure Date 5/28/2024   Attending Physician Evelia Kelley MD Procedure Physician Esteban Zaldivar MD         CARDIAC CATHETERIZATION REPORT     PREOPERATIVE DIAGNOSIS:  persistent dyspnea on exertion, abnormal nuclear stress test  POSTOPERATIVE DIAGNOSIS:  normal angiographic appearance of coronary arteries, normal right sided filling pressures.  PROCEDURE PERFORMED:  right heart catheterization, left ventriculogram, left ventriculogram, selective coronary angiography      PROCEDURE:  The patient was brought to the cardiac catheterization lab in the fasting state.  Informed consent was obtained.  Moderate sedation was employed using IV Versed 1mg and IV fentanyl 75mcg.  I directly observed the patient from 0825 to 0839, for a total of 14 minutes, and an independent trained observer was present and assisted in the monitoring of the patient's level of consciousness and physiological status, watching the heart rate, blood pressure, oximetry, and rhythm, in addition to total moderation time.     ACCESS/CATHETER PLACEMENT:   The right brachial angiocath was exchanged over a wire for a 5F sheath and right heart catheterization as performed per usual routine.  The right radial area was prepped and draped in a sterile manner and anesthetized with 2% lidocaine.  The right radial artery was accessed, and a 6-Anguillan, 11 cm sheath was placed.  Left and right selective coronary angiography was performed using a 6F Tiger 4.0 catheter.  A pigtail catheter was used to cross the aortic valve, measure left ventricular pressure and perform 30 degree ABBASI ventriculogram.  It was pulled across the valve to assess for aortic stenosis.  At the conclusion of the study, the right radial artery hemostasis was performed using a radial band.  The brachial vein sheath was removed and hemostasis was achieved manually.      FINDINGS:      1. Right heart catheterization  RA pressure: 8/5/5 mmHg  RV pressure: 30/6 mmHg  PA pressure: 19/6/13 mmHg  PA wedge pressure: 10/8/6 mmHg  Cardiac output:  10.1 L/min  Cardiac index: 4.5 L/min/m2  Pulmonary vascular resistance: 0.7 Woods units     OXIMETRY:  SVC saturation: 72.1%  PA saturation: 69.1%  Aortic saturation: 84.5%    2.  Left heart catheterization:     Left ventricular pressure: 106/14 mmHg  Aortic pressure: 100/51/73 mmHg    Left ventriculogram demonstrated a LV ejection fraction of 55-60% without significant mitral regurgitation; there are no regional wall motion abnormalities.  There was no aortic stenosis upon pullback of the catheter.    3.  Selective coronary angiography:      Left main artery: The left main artery is a medium size, bifurcating vessel without significant angiographic disease.    LAD:  The left anterior descending artery is a medium caliber, mildly tortuous vessel that wraps around the apex and gives rise to two diagonals.  Mild luminal irregularities without obstructive angiographic disesae.    LCx: The left circumflex artery is a large caliber vessel that gives rise to two obtuse marginals and a distal left PL branch.No significant angiographic disease     RCA:  The right coronary artery is a medium size, dominant vessel that gives rise to a medium rPDA; no significant angiographic disease.     MEDICATIONS:  See nursing record.     COMPLICATIONS:  No major complications were observed during this visit to the catheterization lab.     IMPRESSION:    1.  Right heart catheterization:   - normal right heart filling pressure  - normal pulmonary artery pressure  - normal pulmonary capillary wedge pressure  - normal cardiac output/index   - no intracardiac shunt by saturations    2.  Left heart catheterization: LVEDP 5mmHg, no aortic stenosis.  LVEF 55-60% with normal wall motion, no mitral regurgitation  3.  Coronary angiography:  right dominant system   - LM: no  significant angiographic disease  - LAD: mild luminal irregularities  - LCx: no angiographic disease  - RCA: no angiographic disease     RECOMMENDATIONS: No angiographic indication for coronary revascularization.  Considering normal right heart filling pressures, the etiology of patient's persistent dyspnea on exertion is unlikely cardiac in nature.      Esteban Zaldivar MD  5/28/2024  8:41 AM

## 2024-05-28 NOTE — H&P
Patient seen and examined independently. H and P by Dr. Kelley dated 5/3/24 reviewed. No changes in H and P. Risks and benefits of procedure were discussed with patient. Airway examined.  Patient is ASA class 2 and mallampati class 2. Pt is appropriate for conscious sedation. No history of difficult airway.    The risks, benefits, and alternatives of cardiac catheterization were discussed. The risks included, but were not limited to: bleeding, allergic reaction, infection, stroke, myocardial infarction (heart attack), and death. Benefits of the procedure included: symptomatic improvement, diagnosis of heart disease and prevention of myocardial infarction. Alternatives to the procedure included: not performing cardiac catheterization, treatment with medications only, and observation.        Appropriate candidate for Sedation/Analgesia: Yes  Plan for Sedation reviewed: Yes    Explained Anesthesia options and attendant risks, and have determined patient is an appropriate candidate. Yes  Consent for Sedation obtained: Yes  Patient reevaluated immediately prior to Sedation/Analgesia: Yes

## 2024-05-28 NOTE — DISCHARGE INSTRUCTIONS
HOME CARE INSTRUCTIONS FOLLOWING CORONARY ANGIOGRAPHY,  PERIPHERAL ANGIOGRAPHY, ANGIOPLASTY (PTCA/PTA) OR INSERTION  OF STENT IN THE CORONARY, CAROTID, AND/OR PERIPHERAL ARTERIES      Activity:   DO NOT drive after the procedure. You may resume driving late the following day according to the nurse or physician’s instructions   Plan on resting and relaxing tonight and tomorrow   Resume your normal activity after 48 hours, or as instructed by your physician   Do not lift anything over 10 pounds for the next 24 hours   Avoid sexual activity for the next 24 hours   Avoid drinking alcohol for the next 24 hours   If the wrist was used, avoid bending/flexing of the wrist for the next 24 hours.       What is Normal?   A small lump at the procedure site associated with mild tenderness when touched   The procedure site may be bruised or discolored   There may be a small amount of drainage on the bandage    Special Instructions:   Drink plenty of fluids during the next 24 hours to “flush” the contrast from your system   The bandage is to remain in place for 24 hours   Keep the bandage clean and dry   DO NOT submerge the procedure site for 72 hours (no bath tubs or pools). This includes dishwashing/submersion of the wrist, if the wrist was used   After 24 hours, you must remove the bandage   You should shower after removing the bandage, and wash the procedure site gently with soap and water   If you choose to wear a bandage for a few days, make sure it remains clean and dry and that it is changed daily    When you should NOTIFY YOUR PHYSICIAN:   Bleeding can occur at the procedure site - both on the outside of the skin and/or beneath the surface of the skin   Swelling or a large lump at the procedure site can occur, which may be accompanied by moderate to severe pain. If either of the above occurs, lie down flat. Have someone apply pressure to the procedure site with both hands, as instructed by the nurse. Hold pressure for 20  minutes and the bleeding should stop. Notify your physician of the occurrence. If the bleeding does not stop, call 911 and continue to apply pressure   If you experience signs of a fever, temperature >101 degrees, chills, infection (redness, swelling, thick yellow drainage, or a foul odor from the procedure site)   If you notice any numbness, tingling, or loss of feeling to your fingers or hand, if wrist access was utilized    Other:  - You may resume your present diet, unless otherwise specified by your physician.   - You may resume all of your medications as prescribed, unless otherwise directed by your physician. A list of your medications was provided to you at discharge.    RESUME METFORMIN ON FRIDAY MORNING 5/31

## 2024-05-28 NOTE — PROGRESS NOTES
Attempted to complete initial assessment. Patient is off the unit. CM will re-attempt assessment at a later time.      JOHN Toscano,CRM Pt received s/p [facility] with Dr. Zaldivar. Right radial arterial access site closed with Vasc band with air in cuff. Site CDI, no bleeding or hematoma present. Right brachial venous access site closed with manual pressure. Site CDI. Dr. Zaldivar at bedside and spoke with pt and pt's wife. Vasc band removed per protocol. Site remains unchanged. Recovery complete. Discharge instructions given to pt and pt's wife. IV discontinued, pt taken down to [facility] via wheelchair for discharge.

## 2024-05-29 ENCOUNTER — HOSPITAL ENCOUNTER (OUTPATIENT)
Dept: INTERVENTIONAL RADIOLOGY/VASCULAR | Facility: HOSPITAL | Age: 79
Discharge: HOME | End: 2024-05-29
Attending: INTERNAL MEDICINE
Payer: MEDICARE

## 2024-07-16 RX ORDER — MELOXICAM 15 MG/1
15 TABLET ORAL DAILY
Qty: 90 TABLET | Refills: 1 | Status: SHIPPED | OUTPATIENT
Start: 2024-07-16

## 2024-09-25 NOTE — ED NOTES
Maria L Chavez is a 35 year old female presenting to the walk-in clinic today alone for urinary urgency, small amount of urine, \"pushy\" feeling, and slight dysuria started this morning. Reports, shaky and nauseated this morning when having the \"pushy\" feeling.  Per patient, 5 month pregnant.   Denied vaginal bleeding, fever, HA, NVD    Treatment tried prior to visit: No OTC medication    Swabs/Specimens collected during rooming process:  None    Work, School or  note needed: No    New vs Established: New    Patient would like communication of their results via:    [email or web address]    Cell Phone:   Telephone Information:   Mobile [phone]     Okay to leave a message containing results? Yes     No change in patient assessment. c-collar in placed, awaiting for disposition.

## 2024-10-07 ENCOUNTER — OFFICE VISIT (OUTPATIENT)
Facility: CLINIC | Age: 79
End: 2024-10-07
Payer: MEDICARE

## 2024-10-07 VITALS — HEIGHT: 70 IN | WEIGHT: 230 LBS | BODY MASS INDEX: 32.93 KG/M2

## 2024-10-07 DIAGNOSIS — M17.0 PRIMARY OSTEOARTHRITIS OF BOTH KNEES: Primary | ICD-10-CM

## 2024-10-07 DIAGNOSIS — M54.16 LUMBAR RADICULOPATHY: ICD-10-CM

## 2024-10-07 RX ORDER — GABAPENTIN 300 MG/1
300 CAPSULE ORAL NIGHTLY
Qty: 60 CAPSULE | Refills: 0 | Status: SHIPPED | OUTPATIENT
Start: 2024-10-07

## 2024-10-07 RX ORDER — TRIAMCINOLONE ACETONIDE 40 MG/ML
80 INJECTION, SUSPENSION INTRA-ARTICULAR; INTRAMUSCULAR ONCE
Status: COMPLETED | OUTPATIENT
Start: 2024-10-07 | End: 2024-10-07

## 2024-10-07 RX ORDER — METFORMIN HYDROCHLORIDE 750 MG/1
TABLET, EXTENDED RELEASE ORAL
COMMUNITY
Start: 2024-07-15

## 2024-10-07 RX ORDER — KETOCONAZOLE 20 MG/G
1 CREAM TOPICAL 2 TIMES DAILY
COMMUNITY
Start: 2024-06-13

## 2024-10-07 RX ADMIN — TRIAMCINOLONE ACETONIDE 80 MG: 40 INJECTION, SUSPENSION INTRA-ARTICULAR; INTRAMUSCULAR at 09:21:00

## 2024-10-07 NOTE — PROCEDURES
Risks and benefits of knee injection discussed with the patient, with risks including but not limited to pain and swelling at the injection site and/or within the knee joint, infection, elevation in blood pressure and/or glucose levels, facial flushing. After informed consent, the patient's right and left knees were marked, locally anesthetized with skin refrigerant, prepped with topical antiseptic, and injected with a mixture of 1mL 40mg/mL Kenalog, 2mL 1% lidocaine and 2mL 0.5% marcaine through the inferolateral portal.  A band-aid was applied.  The patient tolerated the procedure well.    Wilber Gill PA-C  [facility]

## 2024-10-07 NOTE — PROGRESS NOTES
[facility] Progress Note      Chief Complaint: Bilateral knee pain going down the leg      Subjective: Son Basurto is a 79-year-old male who presents today complaining of bilateral knee pain.  He has a known history of moderate bilateral knee osteoarthritis.  He denies any recent trauma or injury to the legs or back.  He reports that for the last year and a half he has been experiencing bilateral pain in his legs.  He describes the pain as a burning sensation that travels down the legs to the bottom of the feet and then radiates back upwards towards the thighs and into the lower back.  He says it feels like a tight band squeezing around the lumbar spine.  He sometimes experiences numbness, tingling, and cramping sensations in the bilateral lower extremities.  An MRI of the lumbar spine was ordered in July 2023 which showed severe bilateral neural foraminal narrowing at L5-S1.  His symptoms were resolved for a period of time but then if since returned which is why he is here today for further evaluation.  [place] and is interested in getting another steroid injection into the bilateral knees as this has provided relief in the past.      Objective: He is a pleasant 79-year-old male who does not appear in any acute distress.  Exam of the bilateral knees and lower extremity reveals the overlying skin is intact and there is no appreciable effusion.  He has knee flexion to about 110 degrees and extension to full.  No pain with plantarflexion or dorsiflexion.  Mild tenderness to palpation about the medial and lateral joint lines.  No tenderness to palpation over the patella.  No calf pain or tenderness.  Negative Homans' sign.  Sensation is present to light touch.      Assessment: 79-year-old male with moderate bilateral knee osteoarthritis and bilateral lumbar radiculopathy       Plan: I reviewed history and physical exam findings with the patient and explained that I believe  his symptoms are more consistent with lumbar radiculopathy as opposed to pain that originates due to knee or hip osteoarthritis.  I also explained that his MRI from July 2023 that was ordered by Wilber Gill PA-C shows that there was severe bilateral neural foraminal narrowing between L5-S1 which is consistent with the burning sensation that is he feeling in his lower extremities and bottom of his feet.  We discussed conservative treatment options including prescription medication, core stabilization and proper posture, and referral to pain management or physiatry for possible injections.  He is interested in prescription medication with gabapentin to help relieve some of his nerve pain.  Prescription was sent to the patient's pharmacy and advised him to take this at night to help reduce side effects.  A referral was also sent to pain management and the patient will follow-up on an as-needed basis with them.  All questions and concerns were answered and addressed the patient's satisfaction.  He is in agreement with the treatment plan going forward.      Procedure Note:  Risks and benefits of knee injection discussed with the patient, with risks including but not limited to pain and swelling at the injection site and/or within the knee joint, infection, elevation in blood pressure and/or glucose levels, facial flushing. After informed consent, the patient's right and left knees were marked, locally anesthetized with skin refrigerant, prepped with topical antiseptic, and injected with a mixture of 1mL 40mg/mL Kenalog, 2mL 1% lidocaine and 2mL 0.5% marcaine through the inferolateral portal.  A band-aid was applied.  The patient tolerated the procedure well.      VIDA Matthews PA-C  Orthopedic Surgery   t: [phone]  f: [phone]           This document was partially prepared using Dragon Medical voice recognition software.  Although every attempt is made to correct errors during dictation, discrepancies  may still exist. Please contact me with any questions or clarifications.         VIDA Matthews, PAMaria ElenaC  Orthopedic Surgery   [address] [place] [id number]   t: [phone]  f: [phone]         This document was partially prepared using Dragon Medical voice recognition software.  Although every attempt is made to correct errors during dictation, discrepancies may still exist. Please contact me with any questions or clarifications.

## 2025-01-02 ENCOUNTER — OFFICE VISIT (OUTPATIENT)
Facility: CLINIC | Age: 80
End: 2025-01-02
Payer: MEDICARE

## 2025-01-02 ENCOUNTER — TELEPHONE (OUTPATIENT)
Dept: FAMILY MEDICINE CLINIC | Facility: CLINIC | Age: 80
End: 2025-01-02

## 2025-01-02 ENCOUNTER — LAB ENCOUNTER (OUTPATIENT)
Dept: LAB | Age: 80
End: 2025-01-02
Attending: FAMILY MEDICINE
Payer: MEDICARE

## 2025-01-02 DIAGNOSIS — Z12.5 ENCOUNTER FOR PROSTATE CANCER SCREENING: ICD-10-CM

## 2025-01-02 DIAGNOSIS — I10 PRIMARY HYPERTENSION: ICD-10-CM

## 2025-01-02 DIAGNOSIS — E11.65 TYPE 2 DIABETES MELLITUS WITH HYPERGLYCEMIA, WITHOUT LONG-TERM CURRENT USE OF INSULIN (HCC): ICD-10-CM

## 2025-01-02 DIAGNOSIS — M17.0 PRIMARY OSTEOARTHRITIS OF BOTH KNEES: Primary | ICD-10-CM

## 2025-01-02 DIAGNOSIS — I42.0 DILATED CARDIOMYOPATHY (HCC): Primary | ICD-10-CM

## 2025-01-02 DIAGNOSIS — I42.0 DILATED CARDIOMYOPATHY (HCC): ICD-10-CM

## 2025-01-02 LAB
ALBUMIN SERPL-MCNC: 4.2 G/DL (ref 3.2–4.8)
ALBUMIN/GLOB SERPL: 1.8 {RATIO} (ref 1–2)
ALP LIVER SERPL-CCNC: 57 U/L
ALT SERPL-CCNC: 19 U/L
ANION GAP SERPL CALC-SCNC: 8 MMOL/L (ref 0–18)
AST SERPL-CCNC: 18 U/L (ref ?–34)
BASOPHILS # BLD AUTO: 0.04 X10(3) UL (ref 0–0.2)
BASOPHILS NFR BLD AUTO: 0.6 %
BILIRUB SERPL-MCNC: 1 MG/DL (ref 0.2–1.1)
BUN BLD-MCNC: 17 MG/DL (ref 9–23)
CALCIUM BLD-MCNC: 9.7 MG/DL (ref 8.7–10.4)
CHLORIDE SERPL-SCNC: 105 MMOL/L (ref 98–112)
CHOLEST SERPL-MCNC: 125 MG/DL (ref ?–200)
CO2 SERPL-SCNC: 28 MMOL/L (ref 21–32)
COMPLEXED PSA SERPL-MCNC: 1.23 NG/ML (ref ?–4)
CREAT BLD-MCNC: 0.96 MG/DL
CREAT UR-SCNC: 112.7 MG/DL
EGFRCR SERPLBLD CKD-EPI 2021: 80 ML/MIN/1.73M2 (ref 60–?)
EOSINOPHIL # BLD AUTO: 0.11 X10(3) UL (ref 0–0.7)
EOSINOPHIL NFR BLD AUTO: 1.7 %
ERYTHROCYTE [DISTWIDTH] IN BLOOD BY AUTOMATED COUNT: 13.4 %
EST. AVERAGE GLUCOSE BLD GHB EST-MCNC: 117 MG/DL (ref 68–126)
FASTING PATIENT LIPID ANSWER: YES
FASTING STATUS PATIENT QL REPORTED: YES
GLOBULIN PLAS-MCNC: 2.4 G/DL (ref 2–3.5)
GLUCOSE BLD-MCNC: 115 MG/DL (ref 70–99)
HBA1C MFR BLD: 5.7 % (ref ?–5.7)
HCT VFR BLD AUTO: 45.8 %
HDLC SERPL-MCNC: 40 MG/DL (ref 40–59)
HGB BLD-MCNC: 15.2 G/DL
IMM GRANULOCYTES # BLD AUTO: 0.01 X10(3) UL (ref 0–1)
IMM GRANULOCYTES NFR BLD: 0.2 %
LDLC SERPL CALC-MCNC: 59 MG/DL (ref ?–100)
LYMPHOCYTES # BLD AUTO: 1.28 X10(3) UL (ref 1–4)
LYMPHOCYTES NFR BLD AUTO: 19.3 %
MCH RBC QN AUTO: 30.8 PG (ref 26–34)
MCHC RBC AUTO-ENTMCNC: 33.2 G/DL (ref 31–37)
MCV RBC AUTO: 92.7 FL
MICROALBUMIN UR-MCNC: 0.4 MG/DL
MICROALBUMIN/CREAT 24H UR-RTO: 3.5 UG/MG (ref ?–30)
MONOCYTES # BLD AUTO: 0.53 X10(3) UL (ref 0.1–1)
MONOCYTES NFR BLD AUTO: 8 %
NEUTROPHILS # BLD AUTO: 4.66 X10 (3) UL (ref 1.5–7.7)
NEUTROPHILS # BLD AUTO: 4.66 X10(3) UL (ref 1.5–7.7)
NEUTROPHILS NFR BLD AUTO: 70.2 %
NONHDLC SERPL-MCNC: 85 MG/DL (ref ?–130)
OSMOLALITY SERPL CALC.SUM OF ELEC: 294 MOSM/KG (ref 275–295)
PLATELET # BLD AUTO: 194 10(3)UL (ref 150–450)
POTASSIUM SERPL-SCNC: 4.9 MMOL/L (ref 3.5–5.1)
PROT SERPL-MCNC: 6.6 G/DL (ref 5.7–8.2)
RBC # BLD AUTO: 4.94 X10(6)UL
SODIUM SERPL-SCNC: 141 MMOL/L (ref 136–145)
TRIGL SERPL-MCNC: 150 MG/DL (ref 30–149)
TSI SER-ACNC: 1.56 UIU/ML (ref 0.55–4.78)
VLDLC SERPL CALC-MCNC: 22 MG/DL (ref 0–30)
WBC # BLD AUTO: 6.6 X10(3) UL (ref 4–11)

## 2025-01-02 PROCEDURE — 1159F MED LIST DOCD IN RCRD: CPT | Performed by: PHYSICIAN ASSISTANT

## 2025-01-02 PROCEDURE — 20610 DRAIN/INJ JOINT/BURSA W/O US: CPT | Performed by: PHYSICIAN ASSISTANT

## 2025-01-02 PROCEDURE — 80053 COMPREHEN METABOLIC PANEL: CPT

## 2025-01-02 PROCEDURE — 1160F RVW MEDS BY RX/DR IN RCRD: CPT | Performed by: PHYSICIAN ASSISTANT

## 2025-01-02 PROCEDURE — 84443 ASSAY THYROID STIM HORMONE: CPT

## 2025-01-02 PROCEDURE — 83036 HEMOGLOBIN GLYCOSYLATED A1C: CPT

## 2025-01-02 PROCEDURE — 36415 COLL VENOUS BLD VENIPUNCTURE: CPT

## 2025-01-02 PROCEDURE — 82570 ASSAY OF URINE CREATININE: CPT

## 2025-01-02 PROCEDURE — 82043 UR ALBUMIN QUANTITATIVE: CPT

## 2025-01-02 PROCEDURE — 80061 LIPID PANEL: CPT

## 2025-01-02 PROCEDURE — 85025 COMPLETE CBC W/AUTO DIFF WBC: CPT

## 2025-01-02 RX ORDER — TRIAMCINOLONE ACETONIDE 40 MG/ML
80 INJECTION, SUSPENSION INTRA-ARTICULAR; INTRAMUSCULAR ONCE
Status: COMPLETED | OUTPATIENT
Start: 2025-01-02 | End: 2025-01-02

## 2025-01-02 RX ADMIN — TRIAMCINOLONE ACETONIDE 80 MG: 40 INJECTION, SUSPENSION INTRA-ARTICULAR; INTRAMUSCULAR at 09:52:00

## 2025-01-02 NOTE — TELEPHONE ENCOUNTER
Patient walked in and is asking if due for labs?  Last drawn March 2024 and has upcoming appointment on 1/9/25.  Orders placed per protocol and patient informed

## 2025-01-02 NOTE — PROCEDURES
Reports approximately 6 to 8 weeks of relief from the last injections.  Discussed Zilretta injections which the patient expressed interest in pursuing.  Prior authorization will be placed in 2 months.    Risks and benefits of knee injection discussed with the patient, with risks including but not limited to pain and swelling at the injection site and/or within the knee joint, infection, elevation in blood pressure and/or glucose levels, facial flushing. After informed consent, the patient's right and left knees were marked, locally anesthetized with skin refrigerant, prepped with topical antiseptic, and injected with a mixture of 1mL 40mg/mL Kenalog, 2mL 1% lidocaine and 2mL 0.5% marcaine through the inferolateral portal.  A band-aid was applied.  The patient tolerated the procedure well.    Wilber Gill PA-C  [facility] Orthopedic Surgery

## 2025-01-06 RX ORDER — MELOXICAM 15 MG/1
15 TABLET ORAL DAILY
Qty: 90 TABLET | Refills: 0 | Status: SHIPPED | OUTPATIENT
Start: 2025-01-06

## 2025-01-06 RX ORDER — METFORMIN HYDROCHLORIDE 750 MG/1
750 TABLET, EXTENDED RELEASE ORAL DAILY
Qty: 90 TABLET | Refills: 0 | Status: SHIPPED | OUTPATIENT
Start: 2025-01-06

## 2025-01-09 ENCOUNTER — HOSPITAL ENCOUNTER (OUTPATIENT)
Dept: GENERAL RADIOLOGY | Age: 80
Discharge: HOME | End: 2025-01-09
Attending: FAMILY MEDICINE
Payer: MEDICARE

## 2025-01-09 ENCOUNTER — OFFICE VISIT (OUTPATIENT)
Dept: FAMILY MEDICINE CLINIC | Facility: CLINIC | Age: 80
End: 2025-01-09
Payer: MEDICARE

## 2025-01-09 VITALS
HEART RATE: 72 BPM | OXYGEN SATURATION: 99 % | WEIGHT: 232 LBS | BODY MASS INDEX: 33.21 KG/M2 | SYSTOLIC BLOOD PRESSURE: 110 MMHG | RESPIRATION RATE: 16 BRPM | DIASTOLIC BLOOD PRESSURE: 64 MMHG | HEIGHT: 70 IN

## 2025-01-09 DIAGNOSIS — R07.89 CHEST WALL PAIN: ICD-10-CM

## 2025-01-09 DIAGNOSIS — N20.0 RECURRENT KIDNEY STONES: ICD-10-CM

## 2025-01-09 DIAGNOSIS — I10 PRIMARY HYPERTENSION: ICD-10-CM

## 2025-01-09 DIAGNOSIS — H61.23 BILATERAL IMPACTED CERUMEN: ICD-10-CM

## 2025-01-09 DIAGNOSIS — M17.10 PRIMARY LOCALIZED OSTEOARTHROSIS OF LOWER LEG, UNSPECIFIED LATERALITY: ICD-10-CM

## 2025-01-09 DIAGNOSIS — I44.7 LEFT BUNDLE BRANCH BLOCK: ICD-10-CM

## 2025-01-09 DIAGNOSIS — M75.41 IMPINGEMENT SYNDROME OF RIGHT SHOULDER: ICD-10-CM

## 2025-01-09 DIAGNOSIS — N40.1 BENIGN NON-NODULAR PROSTATIC HYPERPLASIA WITH LOWER URINARY TRACT SYMPTOMS: ICD-10-CM

## 2025-01-09 DIAGNOSIS — K63.5 POLYP OF COLON, UNSPECIFIED PART OF COLON, UNSPECIFIED TYPE: ICD-10-CM

## 2025-01-09 DIAGNOSIS — J42 CHRONIC BRONCHITIS, UNSPECIFIED CHRONIC BRONCHITIS TYPE (HCC): ICD-10-CM

## 2025-01-09 DIAGNOSIS — G47.33 OSA ON CPAP: ICD-10-CM

## 2025-01-09 DIAGNOSIS — I42.0 DILATED CARDIOMYOPATHY (HCC): ICD-10-CM

## 2025-01-09 DIAGNOSIS — K76.0 FATTY INFILTRATION OF LIVER: ICD-10-CM

## 2025-01-09 DIAGNOSIS — E11.65 TYPE 2 DIABETES MELLITUS WITH HYPERGLYCEMIA, WITHOUT LONG-TERM CURRENT USE OF INSULIN (HCC): ICD-10-CM

## 2025-01-09 DIAGNOSIS — E66.01 MORBID (SEVERE) OBESITY DUE TO EXCESS CALORIES (HCC): ICD-10-CM

## 2025-01-09 DIAGNOSIS — Z00.00 MEDICARE ANNUAL WELLNESS VISIT, SUBSEQUENT: Primary | ICD-10-CM

## 2025-01-09 DIAGNOSIS — M19.012 OSTEOARTHRITIS OF LEFT GLENOHUMERAL JOINT: ICD-10-CM

## 2025-01-09 DIAGNOSIS — L03.032 PARONYCHIA OF GREAT TOE OF LEFT FOOT: ICD-10-CM

## 2025-01-09 PROBLEM — G89.29 CHRONIC RIGHT SHOULDER PAIN: Status: RESOLVED | Noted: 2018-09-06 | Resolved: 2025-01-09

## 2025-01-09 PROBLEM — M25.511 CHRONIC RIGHT SHOULDER PAIN: Status: RESOLVED | Noted: 2018-09-06 | Resolved: 2025-01-09

## 2025-01-09 PROCEDURE — 71046 X-RAY EXAM CHEST 2 VIEWS: CPT | Performed by: FAMILY MEDICINE

## 2025-01-09 RX ORDER — METFORMIN HYDROCHLORIDE 750 MG/1
750 TABLET, EXTENDED RELEASE ORAL DAILY
Qty: 90 TABLET | Refills: 2 | Status: SHIPPED | OUTPATIENT
Start: 2025-01-09

## 2025-01-09 NOTE — PROGRESS NOTES
Subjective:   Son Basurto is a 79 year old male who presents for a MA AHA (Medicare Advantage Annual Health Assessment) and Subsequent Annual Wellness visit (Pt already had Initial Annual Wellness) and scheduled follow up of multiple significant but stable problems.   History of Present Illness  The patient, with a history of prediabetes, presents with multiple complaints. The chief complaint is a recurring ingrown toenail on the left great toe. The patient reports that the toe was recently worked on and if the problem recurs, surgery is planned to remove part of the nail.    The patient also reports knee pain, for which he receives cortisone injections approximately every three months. The patient mentions that the relief from the injections does not last the full three months and that his provider is considering a new treatment approach if it is covered by insurance.    Additionally, the patient experiences excessive gas, describing it as constant and primarily located in the stomach. The patient is unsure if the gas is related to his diet and is seeking a possible medication to alleviate the symptoms.    The patient also reports hearing loss due to impacted ear wax in both ears. He has previously seen an ENT specialist for this issue and is seeking a referral for a follow-up visit.    Lastly, the patient describes a localized pain in the chest, which he has been experiencing since the fall. The pain is not constant but is triggered by certain movements, steps, or sneezes. The patient expresses concern that the pain could be due to an internal issue, such as a tumor.       History/Other:   Fall Risk Assessment: low risk for falls.     Cognitive Assessment: normal cognition, alert and oriented x 3      Functional Ability/Status: normal functional status.       Depression Screening (PHQ): negative.        Advanced Directives:   He does NOT have a Living Will. [ ]  He does NOT have a Power of  for  Health Care. [ ]  Discussed Advance Care Planning with patient (and family/surrogate if present). Standard forms made available to patient in After Visit Summary.      Patient Active Problem List   Diagnosis    Primary localized osteoarthrosis, lower leg    Cardiomyopathy (HCC)    Left bundle branch block    HTN (hypertension)    Colon polyp    Fatty infiltration of liver    GIBRAN on CPAP    Recurrent kidney stones    Benign non-nodular prostatic hyperplasia with lower urinary tract symptoms    Osteoarthritis of left glenohumeral joint    Impingement syndrome of right shoulder    Type 2 diabetes mellitus with hyperglycemia, without long-term current use of insulin (HCC)    Chronic bronchitis, unspecified chronic bronchitis type (HCC)    Morbid (severe) obesity due to excess calories (HCC)    Bilateral impacted cerumen    Chest wall pain     Allergies:  He is allergic to neosporin original [neomycin-bacitracin-polymyxin], other, augmentin [amoxicillin-pot clavulanate], and neomycin.    Current Medications:  Outpatient Medications Marked as Taking for the 1/9/25 encounter (Office Visit) with Andrew Dobbins MD   Medication Sig    metFORMIN  MG Oral Tablet 24 Hr Take 1 tablet (750 mg total) by mouth daily.    Meloxicam 15 MG Oral Tab TAKE 1 TABLET DAILY    ketoconazole 2 % External Cream Apply 1 Application topically 2 (two) times daily.    gabapentin 300 MG Oral Cap Take 1 capsule (300 mg total) by mouth nightly. Take 1 capsule each night. If no better in two weeks, take 2 capsules.    Clobetasol Propionate 0.05 % External Ointment Apply a small amount to the affected area twice a day for a week    carvedilol 12.5 MG Oral Tab Take 1 tablet (12.5 mg total) by mouth 2 (two) times daily with meals.    Ipratropium Bromide 0.06 % Nasal Solution 2 sprays by Nasal route 4 (four) times daily.    SoftLayerET LANCETS Does not apply Misc TEST ONCE DAILY    CONTOUR NEXT TEST In Vitro Strip TEST ONCE DAILY    Cholecalciferol  (VITAMIN D3) 3000 units Oral Tab Take 3,000 Units by mouth nightly.    B Complex Vitamins (B COMPLEX OR) Take 1 tablet by mouth daily.      Acetaminophen  MG Oral Tab CR Take 1 tablet (650 mg total) by mouth every 8 (eight) hours as needed for Pain.       Medical History:  He  has a past medical history of Abdominal pain (09/18/2013), Abnormal chest sounds (2013), Anxiety state, unspecified (06/29/2012), Arthropathy, unspecified, site unspecified (06/29/2012), Benign neoplasm of colon (05/28/2013), Bilateral renal cysts (03/14/2013), Candidiasis of skin (10/24/2013), Cardiomyopathy (HCC), Cellulitis and abscess of face (06/29/2012), Change in stool (09/18/2013), Colon polyp (05/02/2013), Constipation (09/18/2013), Diarrhea (09/18/2013), Diverticulitis (04/16/2013), Diverticulitis large intestine (07/24/2013), Diverticulosis (05/02/2013), Dizziness and giddiness (06/29/2012), Dyspnea, Fatty infiltration of liver (03/14/2013), Glucose intolerance (pre-diabetes), Hepatic cyst (03/14/2013), High blood pressure, History of blood transfusion (2001), HTN (hypertension) (11/16/2013), Internal hemorrhoids (05/02/2013), Kidney stone, Laboratory examination ordered as part of a routine general medical examination (06/29/2012), Left bundle branch block (11/14/2013), Left inguinal hernia (03/14/2013), Morbid obesity (HCC) (04/16/2013), Osteoarthrosis, unspecified whether generalized or localized, unspecified site, Other seborrheic keratosis (06/29/2012), Perforated sigmoid colon (HCC) (11/14/2013), Perforated viscus (11/14/2013), Plantar wart (06/29/2012), Pleural effusion (11/25/2013), Pneumonia, organism unspecified(486) (1995), Prediabetes, Primary localized osteoarthrosis, lower leg (05/23/2012), Screening for iron deficiency anemia (06/29/2012), Screening for lipoid disorders (06/29/2012), Screening for other and unspecified endocrine, nutritional, metabolic, and immunity disorders (06/29/2012), Sepsis (HCC)  (11/14/2013), Shortness of breath, Special screening for malignant neoplasm of prostate (06/29/2012), Stricture of sigmoid colon (HCC) (11/14/2013), Unspecified intestinal obstruction (10/24/2013), Unspecified sleep apnea, Ventral hernia, unspecified, without mention of obstruction or gangrene (06/29/2012), Visual impairment, Wound hematoma (07/26/2013), and Wound infection (11/25/2013).  Surgical History:  He  has a past surgical history that includes orthopedic surg (pbp); i&d rectal submucosal abscess; angiogram (9/17/13); colonoscopy (5/2/2013, 10/1/2013, 2/1/2013); repair of hydrocele,tunica (2000); part removal colon w end colostomy (); Ventral hernia repair (10/3/2014); Colectomy; colonoscopy; Ventral hernia repair (N/A, 6/12/2015); other surgical history; other surgical history; other surgical history; other surgical history (11/14/2013); other surgical history (7/24/2013); other surgical history (11/14/2013); other surgical history (3/5/2014); other surgical history (3/17/16); other surgical history (3/21/16); other surgical history (3/22/16); Ventral hernia repair (N/A, 1/11/2017); hernia surgery; hernia surgery (2005); and hernia surgery (1/2017).   Family History:  His family history includes Diabetes in his brother, brother, and mother; Hypertension in his brother; Other in his father.  Social History:  He  reports that he quit smoking about 50 years ago. His smoking use included cigarettes. He started smoking about 62 years ago. He has a 12 pack-year smoking history. He has never used smokeless tobacco. He reports current alcohol use of about 1.0 standard drink of alcohol per week. He reports that he does not use drugs.    Tobacco:  He smoked tobacco in the past but quit greater than 12 months ago.  Social History     Tobacco Use   Smoking Status Former    Current packs/day: 0.00    Average packs/day: 1 pack/day for 12.0 years (12.0 ttl pk-yrs)    Types: Cigarettes    Start date: 1/1/1963     Quit date: 1975    Years since quittin.0   Smokeless Tobacco Never   Tobacco Comments    Updated 25        CAGE Alcohol Screen:         Patient Care Team:  Andrew Dobbins MD as PCP - General (Family Medicine)  Saeed Ambrocio MD (CARDIOLOGY)  Juana Velez, PT as Physical Therapist  Roxane Medellin PTA as Physical Therapy Assistant (Physical Therapy)    Review of Systems  GENERAL: feels well otherwise  SKIN: denies any unusual skin lesions  EYES: denies blurred vision or double vision  HEENT: denies nasal congestion, sinus pain or ST  LUNGS: denies shortness of breath with exertion  CARDIOVASCULAR: denies chest pain on exertion  GI: denies abdominal pain, denies heartburn  : 1 per night nocturia, no complaint of urinary incontinence  MUSCULOSKELETAL: denies back pain  NEURO: denies headaches  PSYCHE: denies depression or anxiety  HEMATOLOGIC: denies hx of anemia  ENDOCRINE: denies thyroid history  ALL/ASTHMA: denies hx of allergy or asthma    Objective:     Physical Exam  VITALS: Pulse normal rate and rhythm.  HEENT: Presence of impacted wax noted in both right and left external auditory canals.  CHEST: Lungs clear to auscultation.no wheezing or rhonchi.   CARDIOVASCULAR: Heart sounds normal upon auscultation.  ABDOMEN: No epigastric pain, right upper abdomen pain, or flank pain.  MUSCULOSKELETAL: No palpable masses, swelling, or signs of bruising noted in the anterior chest wall. Pain localized, suggesting a musculoskeletal origin.  No edema or cyanosis.   Neuro: no neuro deficits.   Psych: normal mood and insight.     Medicare Hearing Assessment:  normal hearing at 5 feet whisper testing.     Visual Acuity:   Right Eye Visual Acuity: Uncorrected Right Eye Chart Acuity: 20/20   Left Eye Visual Acuity: Uncorrected Left Eye Chart Acuity: 20/20   Both Eyes Visual Acuity: Uncorrected Both Eyes Chart Acuity: 20/20   Able To Tolerate Visual Acuity: Yes         Assessment & Plan:   Son Gonzalez  Sb is a 79 year old male who presents for a Medicare Assessment.   Assessment & Plan  1. Medicare annual wellness visit, subsequent  -supervisit was done    2. Paronychia of great toe of left foot  -may need nail removal, CPM    3. Type 2 diabetes mellitus with hyperglycemia, without long-term current use of insulin (HCC)  -stable, CPM  - metFORMIN  MG Oral Tablet 24 Hr; Take 1 tablet (750 mg total) by mouth daily.  Dispense: 90 tablet; Refill: 2  - CMP Now; Future  - Lipids Now; Future  - Hemoglobin A1C Now; Future  - Microalb/Creat Ratio, Random Urine Now; Future    4. Recurrent kidney stones  -stable, CPM    5. Primary localized osteoarthrosis of lower leg, unspecified laterality  -stable, CPM    6. GIBRAN on CPAP  -stable, on CPAP    7. Osteoarthritis of left glenohumeral joint  -stable, CPM    8. Morbid (severe) obesity due to excess calories (HCC)  -stable, on metformin. CPM    9. Left bundle branch block  -stable, CPM    10. Impingement syndrome of right shoulder  -stable, CPM    11. Primary hypertension  -stable, CPM    12. Chronic bronchitis, unspecified chronic bronchitis type (HCC)  -stable, CPM    13. Fatty infiltration of liver  -stable, CPM    14. Benign non-nodular prostatic hyperplasia with lower urinary tract symptoms  -stable, CPM    15. Bilateral impacted cerumen  -will refer to ENT   - ENT Referral - In Network    16. Chest wall pain  -will chest wall pain with radiation to right thoracic pain  - XR CHEST PA + LAT CHEST (CPT=71046); Future    17. Dilated cardiomyopathy (HCC)  -stable, CPM    18. Polyp of colon, unspecified part of colon, unspecified type  -UTD with c-scope.        Ingrown Toenail  Recurrent issue on left great toe, recently treated by podiatrist. Plan for surgical intervention if it recurs.  -Continue follow-up with podiatrist as needed.    Knee Pain  Receiving cortisone injections every three months, but relief is not lasting the full duration. No current plans for  surgical intervention.  -Continue current treatment plan with cortisone injections.    Gastrointestinal Gas  Reports excessive gas and bloating, but no upper abdominal pressure or pain. Uncertain if related to diet.  -Consider dietary modifications and over-the-counter remedies for gas relief.    Prediabetes  A1c at 5.7 as of January 2, 2025. Currently on Metformin.  -Refill Metformin prescription and send to [facility].  -Continue current treatment plan and monitor A1c levels.    Ear Wax Impaction  Reports decreased hearing due to wax impaction in both ears.  -Refer to ENT for ear wax removal.    Chest/Back Pain  Reports localized pain in chest and back since fall, worsens with certain movements. No palpable mass or swelling. Lungs and heart sound normal on auscultation.  -Order chest x-ray to further evaluate.    General Health Maintenance  -Continue current medications and treatments.  -Order labs for June 2025.  -Schedule follow-up appointment for summer 2025.       The patient indicates understanding of these issues and agrees to the plan.  Continue with current treatment plan.  Reinforced healthy diet, lifestyle, and exercise.            Andrew Dobbins MD, 1/9/2025     Follow up in 6 months.     Supplementary Documentation:   General Health:       Health Maintenance   Topic Date Due    Zoster Vaccines (2 of 3) 08/27/2020    COVID-19 Vaccine (7 - 2024-25 season) 09/01/2024    Influenza Vaccine (1) 10/01/2024    Annual Well Visit  01/01/2025    Annual Depression Screening  01/01/2025    Fall Risk Screening (Annual)  01/01/2025    Diabetes Care: Foot Exam (Annual)  01/01/2025    Diabetes Care Dilated Eye Exam  03/31/2027 (Originally 9/20/2019)    Diabetes Care A1C  07/02/2025    Diabetes Care: GFR  01/02/2026    Diabetes Care: Microalb/Creat Ratio (Annual)  Completed    Pneumococcal Vaccine: 50+ Years  Completed    Meningococcal B Vaccine  Aged Out

## 2025-01-13 ENCOUNTER — PATIENT MESSAGE (OUTPATIENT)
Dept: FAMILY MEDICINE CLINIC | Facility: CLINIC | Age: 80
End: 2025-01-13

## 2025-01-29 ENCOUNTER — OFFICE VISIT (OUTPATIENT)
Facility: LOCATION | Age: 80
End: 2025-01-29
Payer: MEDICARE

## 2025-01-29 DIAGNOSIS — H93.293 ABNORMAL AUDITORY PERCEPTION OF BOTH EARS: Primary | ICD-10-CM

## 2025-01-29 DIAGNOSIS — H61.23 CERUMEN DEBRIS ON TYMPANIC MEMBRANE OF BOTH EARS: ICD-10-CM

## 2025-01-29 PROCEDURE — 1160F RVW MEDS BY RX/DR IN RCRD: CPT | Performed by: OTOLARYNGOLOGY

## 2025-01-29 PROCEDURE — 1159F MED LIST DOCD IN RCRD: CPT | Performed by: OTOLARYNGOLOGY

## 2025-01-29 PROCEDURE — 92567 TYMPANOMETRY: CPT | Performed by: AUDIOLOGIST

## 2025-01-29 PROCEDURE — 99203 OFFICE O/P NEW LOW 30 MIN: CPT | Performed by: OTOLARYNGOLOGY

## 2025-01-29 PROCEDURE — 92557 COMPREHENSIVE HEARING TEST: CPT | Performed by: AUDIOLOGIST

## 2025-01-29 NOTE — PROGRESS NOTES
Son Basurto is a 79 year old male.   Chief Complaint   Patient presents with    Cerumen Impaction     HPI:   He has a history of decreased hearing.  He tends to get wax impactions.  Current Outpatient Medications   Medication Sig Dispense Refill    metFORMIN  MG Oral Tablet 24 Hr Take 1 tablet (750 mg total) by mouth daily. 90 tablet 2    Meloxicam 15 MG Oral Tab TAKE 1 TABLET DAILY 90 tablet 0    ketoconazole 2 % External Cream Apply 1 Application topically 2 (two) times daily.      gabapentin 300 MG Oral Cap Take 1 capsule (300 mg total) by mouth nightly. Take 1 capsule each night. If no better in two weeks, take 2 capsules. 60 capsule 0    Clobetasol Propionate 0.05 % External Ointment Apply a small amount to the affected area twice a day for a week 30 g 1    carvedilol 12.5 MG Oral Tab Take 1 tablet (12.5 mg total) by mouth 2 (two) times daily with meals. 180 tablet 3    Ipratropium Bromide 0.06 % Nasal Solution 2 sprays by Nasal route 4 (four) times daily. 1 Bottle 11    SHARMIN MICROLET LANCETS Does not apply Misc TEST ONCE DAILY 100 each 3    CONTOUR NEXT TEST In Vitro Strip TEST ONCE DAILY 100 strip 3    Cholecalciferol (VITAMIN D3) 3000 units Oral Tab Take 3,000 Units by mouth nightly.      B Complex Vitamins (B COMPLEX OR) Take 1 tablet by mouth daily.        Acetaminophen  MG Oral Tab CR Take 1 tablet (650 mg total) by mouth every 8 (eight) hours as needed for Pain.        Past Medical History:    Abdominal pain    Abnormal chest sounds    Anxiety state, unspecified    Arthropathy, unspecified, site unspecified    Benign neoplasm of colon    large, adenomatous polyp    Bilateral renal cysts    Candidiasis of skin    Cardiomyopathy (HCC)    nonischemic LVEF 45-50%    Cellulitis and abscess of face    Change in stool    Colon polyp    x1    Constipation    Diarrhea    Diverticulitis    Diverticulitis large intestine    Diverticulosis    Dizziness and giddiness    Dyspnea    Fatty  infiltration of liver    mild-no recent symptoms    Glucose intolerance (pre-diabetes)    Hepatic cyst    Lt hepatic cysts    High blood pressure    History of blood transfusion    HTN (hypertension)    Internal hemorrhoids    Kidney stone    nephrolithiasis, 3/14/2013 nonobstructing stones Rt lower pole kidney    Laboratory examination ordered as part of a routine general medical examination    Left bundle branch block    Left inguinal hernia    small fat containing    Morbid obesity (HCC)    Osteoarthrosis, unspecified whether generalized or localized, unspecified site    Other seborrheic keratosis    Perforated sigmoid colon (HCC)    Perforated viscus    Plantar wart    Pleural effusion    small, w/ infiltrates atelectasis Lt > Rt lung bases    Pneumonia, organism unspecified(486)    Prediabetes    Primary localized osteoarthrosis, lower leg    Screening for iron deficiency anemia    Screening for lipoid disorders    Screening for other and unspecified endocrine, nutritional, metabolic, and immunity disorders    Sepsis (HCC)    Shortness of breath    ON EXERTION NOT REQUIRING O2    Special screening for malignant neoplasm of prostate    Stricture of sigmoid colon (HCC)    Unspecified intestinal obstruction    colonic stricture    Unspecified sleep apnea    AHI 33, sao2 76% CPAP 15     Ventral hernia, unspecified, without mention of obstruction or gangrene    Visual impairment    glasses    Wound hematoma    Wound infection      Social History:  Social History     Socioeconomic History    Marital status:    Tobacco Use    Smoking status: Former     Current packs/day: 0.00     Average packs/day: 1 pack/day for 12.0 years (12.0 ttl pk-yrs)     Types: Cigarettes     Start date: 1963     Quit date: 1975     Years since quittin.1    Smokeless tobacco: Never    Tobacco comments:     Updated 25   Vaping Use    Vaping status: Never Used   Substance and Sexual Activity    Alcohol use: Yes      Alcohol/week: 1.0 standard drink of alcohol     Types: 1 Glasses of wine per week     Comment: social    Drug use: No   Other Topics Concern    Caffeine Concern Yes     Comment: little    Exercise No    Seat Belt Yes     Social Drivers of Health     Food Insecurity: No Food Insecurity (1/9/2025)    NCSS - Food Insecurity     Worried About Running Out of Food in the Last Year: No     Ran Out of Food in the Last Year: No   Transportation Needs: No Transportation Needs (1/9/2025)    NCSS - Transportation     Lack of Transportation: No   Housing Stability: Not At Risk (1/9/2025)    NCSS - Housing/Utilities     Has Housing: Yes     Worried About Losing Housing: No     Unable to Get Utilities: No      Past Surgical History:   Procedure Laterality Date    Angiogram  9/17/13    nonobstructive CAD    Colectomy      Colonoscopy  5/2/2013, 10/1/2013, 2/1/2013 5/2/2013 colonoscopy w/ snare polypectomy, 10/1/2013 colonoscopy w/ dilatation of anastomotic stricture    Colonoscopy      Hernia surgery      umbilical and right inguinal    Hernia surgery  2005    hernia repair    Hernia surgery  1/2017    ventral hernia with mesh    I&d rectal submucosal abscess      Orthopedic surg (pbp)      shattered right great toe-pins    Other surgical history      BILATERAL A  PMM     Other surgical history      TRIGGER FINGER RIGHT     Other surgical history      bowel resection    Other surgical history  11/14/2013    surgery for perforated viscus    Other surgical history  7/24/2013    colon resection with LAR of rectosigmoid colon and mobilization of splenic flexure    Other surgical history  11/14/2013    central venous line placement, LAR, creation of colostomy, Persaud's pouch, drainage of abdominal abscess    Other surgical history  3/5/2014    central venous line placement, take down of colostomy , excision of skin lesion left jaw    Other surgical history  3/17/16    Cysto, R RPG, R ureteral dilation, R URS, R Nephroscopy w/ stone  manipulation and laser litho of renal calculi, R stent placement     Other surgical history  3/21/16    Cysto Stent Removal     Other surgical history  3/22/16    Rt ESWL    Part removal colon w end colostomy      Repair of hydrocele,tunica  2000    Ventral hernia repair  10/3/2014    Procedure: HERNIA VENTRAL REPAIR;  Surgeon: Esteban Dickinson MD;  Location:  MAIN OR    Ventral hernia repair N/A 6/12/2015    Procedure: HERNIA VENTRAL REPAIR;  Surgeon: Esteban Dickinson MD;  Location:  MAIN OR    Ventral hernia repair N/A 1/11/2017    Procedure: HERNIA VENTRAL REPAIR;  Surgeon: Esteban Dickinson MD;  Location:  MAIN OR         REVIEW OF SYSTEMS:   GENERAL HEALTH: feels well otherwise  GENERAL : denies fever, chills, sweats, weight loss, weight gain  SKIN: denies any unusual skin lesions or rashes  RESPIRATORY: denies shortness of breath with exertion  NEURO: denies headaches    EXAM:   There were no vitals taken for this visit.    System Findings Details   Constitutional  Overall appearance - Normal.   Psychiatric  Orientation - Oriented to time, place, person & situation. Appropriate mood and affect.   Head/Face  Facial features -- Normal. Skull - Normal.   Eyes  Pupils equal ,round ,react to light and accomidate   Ears, Nose, Throat, Neck  X impactions bilaterally moved today on the microscope otherwise tympanic membrane's are clear no infection or perforations.   Neurological  Memory - Normal. Cranial nerves - Cranial nerves II through XII grossly intact.   Lymph Detail  Submental. Submandibular. Anterior cervical. Posterior cervical. Supraclavicular.     Latest Audiogram Result (Hz) Exam performed: 1/29/2025 10:07 AM Last edited by Indigo Cain Au.D on 1/29/2025 10:58 AM        125 250  1500 2000 3000 4000 6000 8000    Right air:  15 10  15  25 25 45 60 75    Left air:  20 10  10  30 35 45 55 75    Right mastoid bone:   5  10  25  40      Left mastoid bone:   5  10  25  40         Reliability:   Good    Transducer:  Headphones    Technique:  Conventional Audiometry    Comments:            Latest Speech Audiometry  Last edited by Indigo Cain Au.D on 1/29/2025 10:58 AM       Ear Method PTA SAT SRT [facility] Test/list Score (%) Intensity Mask/noise Notes    right live voice   15   W-22 96 55      left live voice   15   W-22 100 55       Comments: PIPB test was negative for rollover, bilaterally.                   Latest Tympanogram Result       Probe Tone (Hz): 226 Exam performed: 1/29/2025 10:07 AM Last edited by Indigo Cain Au.D on 1/29/2025 10:58 AM      Tympanograms  These were drawn by a user, not generated from device data      Right Ear Left Ear                     Right Ear Left Ear    Tympanogram type: Type A Type A    Canal volume (mL): 1.24 1.22    Peak pressure (daPa):      Peak amplitude (mL):      Tympanogram width (daPa):        Comments:                    Latest Audiogram and Tympanogram Result Text  Last edited by Indigo Cain Au.D on 1/29/2025 10:58 AM      Study Result                 Narrative & Impression  History:  Pt reports symptoms of ear pressure/ fullness, bilaterally.  Otoscopic inspection yielded occluded cerumen, bilaterally.     Results:  Mild to severe sensorineural hearing loss 2-8kHz, bilaterally.     Rec:  Follow up with MD.   Binaural amplification pending MD clearance.                  ASSESSMENT AND PLAN:   1. 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  He is not interested in hearing aids at this time.    2. Cerumen debris on tympanic membrane of both ears  Removed today.  He will see me back as needed.      The patient indicates understanding of these issues and agrees to the plan.    No follow-ups on file.    Aayush Wright MD  1/29/2025  12:58 PM

## 2025-01-29 NOTE — PROGRESS NOTES
Son Basurto was seen for audiometric evaluation today.  Referred back to physician.     Ileana Lopez

## 2025-02-25 ENCOUNTER — TELEPHONE (OUTPATIENT)
Dept: FAMILY MEDICINE CLINIC | Facility: CLINIC | Age: 80
End: 2025-02-25

## 2025-03-06 DIAGNOSIS — M17.0 PRIMARY OSTEOARTHRITIS OF BOTH KNEES: Primary | ICD-10-CM

## 2025-03-10 ENCOUNTER — TELEPHONE (OUTPATIENT)
Dept: ORTHOPEDICS CLINIC | Facility: CLINIC | Age: 80
End: 2025-03-10

## 2025-03-11 NOTE — ADDENDUM NOTE
Patient ID:  Eric Vanessa is a 18 year old male.    Chief Complaint   Patient presents with    Office Visit    Other     Patient cut his left index finger last week. States cut was deep. Noticed some puss yesterday and today       HPI  Pt cut his 2nd finger using a saw yesterday. States it was deep and bled for about 15 minutes.   States skin seems to be healing. Wants to make sure it is not infected. Did notice slight yellowish drainage yesterday and this morning.   No erythema or swelling    Review of Systems   Constitutional:  Negative for chills and fever.   Respiratory:  Negative for cough, shortness of breath and wheezing.    Skin:  Positive for wound. Negative for rash.   Neurological:  Negative for dizziness, light-headedness and headaches.   Hematological:  Negative for adenopathy. Does not bruise/bleed easily.       ALLERGIES:   Allergen Reactions    Dust Mite Extract Runny Nose     Itchy eyes, sneezing       Past Medical History:   Diagnosis Date    Allergy        Past Surgical History:   Procedure Laterality Date    Tear duct surgery      Tonsillectomy      Tympanostomy      Wrist surgery         Family History   Problem Relation Age of Onset    Hypertension Mother        Social History     Socioeconomic History    Marital status: Single     Spouse name: Not on file    Number of children: Not on file    Years of education: Not on file    Highest education level: Not on file   Occupational History    Not on file   Tobacco Use    Smoking status: Never    Smokeless tobacco: Never   Vaping Use    Vaping status: never used   Substance and Sexual Activity    Alcohol use: Never    Drug use: Never    Sexual activity: Never   Other Topics Concern    Not on file   Social History Narrative    Not on file     Social Determinants of Health     Financial Resource Strain: Not on file   Food Insecurity: Not on file   Transportation Needs: Not on file   Physical Activity: Not on file   Stress: Not on file   Social  Addended by: Amy Mercedes on: 6/5/2023 12:14 PM     Modules accepted: Orders Connections: Not on file   Interpersonal Safety: Not on file       Current Outpatient Medications   Medication Sig Dispense Refill    Claravis 40 MG capsule TAKE 1 CAPSULE BY MOUTH TWICE DAILY WITH FOOD       No current facility-administered medications for this visit.       Visit Vitals  BP 97/56 (BP Location: LUE - Left upper extremity, Patient Position: Sitting)   Pulse 64   Temp 98.6 °F (37 °C) (Temporal)   Ht 5' 11\" (1.803 m)   Wt 68.7 kg (151 lb 6.4 oz)   SpO2 98%   BMI 21.12 kg/m²       Physical Exam  Constitutional:       General: He is not in acute distress.     Appearance: Normal appearance.   Skin:     General: Skin is warm and dry.      Findings: No bruising, erythema, lesion or rash.      Comments: Left lateral mid 2nd finger with superficial laceration without any drainage or erythema. No bleeding. Slightly tender to touch.   Full ROM to finger.    Neurological:      General: No focal deficit present.      Mental Status: He is alert and oriented to person, place, and time.   Psychiatric:         Mood and Affect: Mood normal.         Behavior: Behavior normal.          Assessment/Plan:      Problem List Items Addressed This Visit    None  Visit Diagnoses       Laceration of left index finger without foreign body without damage to nail, initial encounter    -  Primary        Keep cut clean and dry. Apply bandaid for few days  Monitor for any signs of infection and f/u if needed    Schedule follow up: as needed        Mariel Bonilla CNP

## 2025-03-13 NOTE — TELEPHONE ENCOUNTER
Patient authorized visco to be scheduled:    DOS:4/7/25  PROVIDER:talib   MEDICATION: bilat zilretta   OFFICE LOCATION: [facility]  PULLED:yes  LABELED:yes  PLACED: yes

## 2025-03-18 ENCOUNTER — MED REC SCAN ONLY (OUTPATIENT)
Dept: FAMILY MEDICINE CLINIC | Facility: CLINIC | Age: 80
End: 2025-03-18

## 2025-04-07 ENCOUNTER — OFFICE VISIT (OUTPATIENT)
Facility: CLINIC | Age: 80
End: 2025-04-07
Payer: MEDICARE

## 2025-04-07 VITALS — WEIGHT: 232 LBS | BODY MASS INDEX: 33.21 KG/M2 | HEIGHT: 70 IN

## 2025-04-07 DIAGNOSIS — M17.0 PRIMARY OSTEOARTHRITIS OF BOTH KNEES: Primary | ICD-10-CM

## 2025-04-07 NOTE — PROCEDURES
Also mentions soreness in both feet with prolonged standing and walking. Discussed possible compensatory stresses on the feet from the knees and encouraged him to monitor his symptoms.    Risks and benefits of knee injection discussed with the patient, with risks including but not limited to pain and swelling at the injection site and/or within the knee joint, infection, elevation in blood pressure and/or glucose levels, facial flushing. After informed consent, the patient's right and left knees were marked, locally anesthetized with skin refrigerant, prepped with topical antiseptic, and injected with 5mL of 32mg/5mL Zilretta through the inferolateral portal.  A band-aid was applied.  The patient tolerated the procedure well.    Wilber Gill PA-C  [facility] Orthopedic Surgery

## 2025-04-09 RX ORDER — MELOXICAM 15 MG/1
15 TABLET ORAL DAILY
Qty: 90 TABLET | Refills: 1 | Status: SHIPPED | OUTPATIENT
Start: 2025-04-09

## 2025-04-09 NOTE — TELEPHONE ENCOUNTER
Refill passed per [facility] protocol.    Requested Prescriptions   Pending Prescriptions Disp Refills    MELOXICAM 15 MG Oral Tab [Pharmacy Med Name: MELOXICAM TABS 15MG] 90 tablet 3     Sig: TAKE 1 TABLET DAILY       Non-Narcotic Pain Medication Protocol Passed - 4/9/2025  2:46 PM        Passed - In person appointment or virtual visit in the past 6 mos or appointment in next 3 mos     Recent Outpatient Visits              2 days ago Primary osteoarthritis of both knees    [facility], [address] [place] [address] Wilber Gill PA-C    Office Visit    2 months ago Abnormal auditory perception of both ears    [facility], [place], Aayush Gomez MD    Office Visit    2 months ago Abnormal auditory perception of both ears    [facility], [place] Bonilla, Indigo Armstrong Au.D    Office Visit    3 months ago Medicare annual wellness visit, subsequent    [facility], [address] Andrew Dobbins MD    Office Visit    3 months ago Primary osteoarthritis of both knees    [facility], [address] [place] [address] Wilber Gill PA-C    Office Visit          Future Appointments         Provider Department Appt Notes    In 2 months Wilber Gill PA-C [facility], [address] [place] [address] KARMA knee inj                    Passed - Medication is active on med list           Future Appointments         Provider Department Appt Notes    In 2 months Wilber Gill PA-C [facility], [address] [place] [address] KARMA knee inj          Recent Outpatient Visits              2 days ago Primary osteoarthritis of both knees    [facility], [address] [place] [address] Wilber Glil PA-C    Office Visit    2 months ago Abnormal auditory perception of both ears    [facility]  Caleb Gómez Rodney T, MD    Office Visit    2 months ago Abnormal auditory perception of both ears    [facility], [place] Caleb Gómez Dawn, Au.D    Office Visit    3 months ago Medicare annual wellness visit, subsequent    [facility], [address] Andrew Dobbins MD    Office Visit    3 months ago Primary osteoarthritis of both knees    [facility], [address] [place] [address] Wilber Gill PA-C    Office Visit

## 2025-07-07 ENCOUNTER — TELEPHONE (OUTPATIENT)
Dept: ORTHOPEDICS CLINIC | Facility: CLINIC | Age: 80
End: 2025-07-07

## 2025-07-07 ENCOUNTER — OFFICE VISIT (OUTPATIENT)
Facility: CLINIC | Age: 80
End: 2025-07-07
Payer: MEDICARE

## 2025-07-07 ENCOUNTER — TELEPHONE (OUTPATIENT)
Facility: CLINIC | Age: 80
End: 2025-07-07

## 2025-07-07 VITALS — BODY MASS INDEX: 33.21 KG/M2 | WEIGHT: 232 LBS | HEIGHT: 70 IN

## 2025-07-07 DIAGNOSIS — M17.0 PRIMARY OSTEOARTHRITIS OF BOTH KNEES: Primary | ICD-10-CM

## 2025-07-07 DIAGNOSIS — I73.9 CLAUDICATION OF BOTH LOWER EXTREMITIES: ICD-10-CM

## 2025-07-07 NOTE — PROCEDURES
Risks and benefits of knee injection discussed with the patient, with risks including but not limited to pain and swelling at the injection site and/or within the knee joint, infection, elevation in blood pressure and/or glucose levels, facial flushing. After informed consent, the patient's right and left knees were marked, locally anesthetized with skin refrigerant, prepped with topical antiseptic, and injected with 5mL of 32mg/5mL Zilretta through the inferolateral portal.  A band-aid was applied.  The patient tolerated the procedure well.    Wilber Gill PA-C  [facility] Orthopedic Surgery

## 2025-07-07 NOTE — PROGRESS NOTES
[facility] Progress Note    Subjective: Patient presents with his wife to touch base of multiple issues.  With regards to the knees, he reports the last injections of Zilretta into the knees only offered relief for 3 to 4 weeks.  They report that his posture is worsening, they feel that when he walks he is hunched forward.  He reports he previously underwent significant muscle spasms in the legs which have been alleviated by pickle juice.  He reports pain from his knees down the front of his legs towards his feet.  He reports he is going to be obtaining updated sneakers so that hopefully the improved shock absorption improves his knee and leg pain.    Objective: Patient seated in the exam chair.  Ambulating independently.  No acute distress.  Both knees without any significant redness, warmth, or effusion noted.    Assessment/Plan: 79-year-old male with known underlying exacerbated radiographically moderate bilateral knee osteoarthritis.  I explained to the patient and his wife that since the most recent radiographs which were obtained in 2023, his severity of arthritis may have progressed especially considering the effectiveness of Zilretta is diminishing.  We discussed possible alternatives in the future if Zilretta does only offer 3 to 4 weeks of relief including radiofrequency ablation, surgical consultation.  For the patient's postural problems, they inquired about physical therapy and I discussed physical therapy targeted towards the core muscles and back to improve posture.  A spine conditioning packet was provided to the patient and a referral to physical therapy was also placed on the patient's chart.  I invited him to contact me if this Zilretta injection fails again at which point repeat radiographs of both knees would be recommended.        Wilber Gill PA-C  [facility] Orthopedic Surgery    This note was dictated using Dragon software.  While it was briefly proofread prior to completion,  some grammatical, spelling, and word choice errors due to dictation may still occur.

## 2025-07-23 ENCOUNTER — LAB ENCOUNTER (OUTPATIENT)
Dept: LAB | Age: 80
End: 2025-07-23
Attending: FAMILY MEDICINE
Payer: MEDICARE

## 2025-07-23 DIAGNOSIS — E11.65 TYPE 2 DIABETES MELLITUS WITH HYPERGLYCEMIA, WITHOUT LONG-TERM CURRENT USE OF INSULIN (HCC): ICD-10-CM

## 2025-07-23 LAB
ALBUMIN SERPL-MCNC: 4.2 G/DL (ref 3.2–4.8)
ALBUMIN/GLOB SERPL: 1.7 {RATIO} (ref 1–2)
ALP LIVER SERPL-CCNC: 52 U/L (ref 45–117)
ALT SERPL-CCNC: 18 U/L (ref 10–49)
ANION GAP SERPL CALC-SCNC: 7 MMOL/L (ref 0–18)
AST SERPL-CCNC: 18 U/L (ref ?–34)
BILIRUB SERPL-MCNC: 0.9 MG/DL (ref 0.2–1.1)
BUN BLD-MCNC: 19 MG/DL (ref 9–23)
CALCIUM BLD-MCNC: 9 MG/DL (ref 8.7–10.6)
CHLORIDE SERPL-SCNC: 105 MMOL/L (ref 98–112)
CHOLEST SERPL-MCNC: 117 MG/DL (ref ?–200)
CO2 SERPL-SCNC: 29 MMOL/L (ref 21–32)
CREAT BLD-MCNC: 0.93 MG/DL (ref 0.7–1.3)
CREAT UR-SCNC: 159.9 MG/DL
EGFRCR SERPLBLD CKD-EPI 2021: 84 ML/MIN/1.73M2 (ref 60–?)
EST. AVERAGE GLUCOSE BLD GHB EST-MCNC: 123 MG/DL (ref 68–126)
FASTING PATIENT LIPID ANSWER: NO
FASTING STATUS PATIENT QL REPORTED: NO
GLOBULIN PLAS-MCNC: 2.5 G/DL (ref 2–3.5)
GLUCOSE BLD-MCNC: 110 MG/DL (ref 70–99)
HBA1C MFR BLD: 5.9 % (ref ?–5.7)
HDLC SERPL-MCNC: 37 MG/DL (ref 40–59)
LDLC SERPL CALC-MCNC: 51 MG/DL (ref ?–100)
MICROALBUMIN UR-MCNC: 1 MG/DL
MICROALBUMIN/CREAT 24H UR-RTO: 6.3 UG/MG (ref ?–30)
NONHDLC SERPL-MCNC: 80 MG/DL (ref ?–130)
OSMOLALITY SERPL CALC.SUM OF ELEC: 295 MOSM/KG (ref 275–295)
POTASSIUM SERPL-SCNC: 4.8 MMOL/L (ref 3.5–5.1)
PROT SERPL-MCNC: 6.7 G/DL (ref 5.7–8.2)
SODIUM SERPL-SCNC: 141 MMOL/L (ref 136–145)
TRIGL SERPL-MCNC: 174 MG/DL (ref 30–149)
VLDLC SERPL CALC-MCNC: 25 MG/DL (ref 0–30)

## 2025-07-23 PROCEDURE — 82570 ASSAY OF URINE CREATININE: CPT

## 2025-07-23 PROCEDURE — 82043 UR ALBUMIN QUANTITATIVE: CPT

## 2025-07-23 PROCEDURE — 80061 LIPID PANEL: CPT

## 2025-07-23 PROCEDURE — 36415 COLL VENOUS BLD VENIPUNCTURE: CPT

## 2025-07-23 PROCEDURE — 80053 COMPREHEN METABOLIC PANEL: CPT

## 2025-07-23 PROCEDURE — 83036 HEMOGLOBIN GLYCOSYLATED A1C: CPT

## 2025-07-28 ENCOUNTER — LAB ENCOUNTER (OUTPATIENT)
Dept: LAB | Age: 80
End: 2025-07-28
Attending: FAMILY MEDICINE
Payer: MEDICARE

## 2025-07-28 DIAGNOSIS — R41.3 MEMORY LOSS, SHORT TERM: ICD-10-CM

## 2025-07-28 PROBLEM — I50.20 HFREF (HEART FAILURE WITH REDUCED EJECTION FRACTION) (HCC): Status: ACTIVE | Noted: 2025-07-28

## 2025-07-28 PROBLEM — E11.42 TYPE 2 DIABETES MELLITUS WITH DIABETIC POLYNEUROPATHY, WITHOUT LONG-TERM CURRENT USE OF INSULIN (HCC): Status: ACTIVE | Noted: 2025-07-28

## 2025-07-28 LAB
BASOPHILS # BLD AUTO: 0.05 X10(3) UL (ref 0–0.2)
BASOPHILS NFR BLD AUTO: 0.6 %
EOSINOPHIL # BLD AUTO: 0.13 X10(3) UL (ref 0–0.7)
EOSINOPHIL NFR BLD AUTO: 1.5 %
ERYTHROCYTE [DISTWIDTH] IN BLOOD BY AUTOMATED COUNT: 13 %
FOLATE SERPL-MCNC: 33.1 NG/ML (ref 5.4–?)
HCT VFR BLD AUTO: 46.3 % (ref 39–53)
HGB BLD-MCNC: 15.8 G/DL (ref 13–17.5)
IMM GRANULOCYTES # BLD AUTO: 0.02 X10(3) UL (ref 0–1)
IMM GRANULOCYTES NFR BLD: 0.2 %
LYMPHOCYTES # BLD AUTO: 1.47 X10(3) UL (ref 1–4)
LYMPHOCYTES NFR BLD AUTO: 17.2 %
MCH RBC QN AUTO: 30.2 PG (ref 26–34)
MCHC RBC AUTO-ENTMCNC: 34.1 G/DL (ref 31–37)
MCV RBC AUTO: 88.4 FL (ref 80–100)
MONOCYTES # BLD AUTO: 0.68 X10(3) UL (ref 0.1–1)
MONOCYTES NFR BLD AUTO: 8 %
NEUTROPHILS # BLD AUTO: 6.19 X10 (3) UL (ref 1.5–7.7)
NEUTROPHILS # BLD AUTO: 6.19 X10(3) UL (ref 1.5–7.7)
NEUTROPHILS NFR BLD AUTO: 72.5 %
PLATELET # BLD AUTO: 216 10(3)UL (ref 150–450)
RBC # BLD AUTO: 5.24 X10(6)UL (ref 3.8–5.8)
T4 FREE SERPL-MCNC: 1.5 NG/DL (ref 0.8–1.7)
TSI SER-ACNC: 1.69 UIU/ML (ref 0.55–4.78)
VIT B12 SERPL-MCNC: 372 PG/ML (ref 211–911)
WBC # BLD AUTO: 8.5 X10(3) UL (ref 4–11)

## 2025-07-28 PROCEDURE — 84439 ASSAY OF FREE THYROXINE: CPT

## 2025-07-28 PROCEDURE — 82607 VITAMIN B-12: CPT

## 2025-07-28 PROCEDURE — 84443 ASSAY THYROID STIM HORMONE: CPT

## 2025-07-28 PROCEDURE — 82746 ASSAY OF FOLIC ACID SERUM: CPT

## 2025-07-28 PROCEDURE — 85025 COMPLETE CBC W/AUTO DIFF WBC: CPT

## 2025-07-28 PROCEDURE — 36415 COLL VENOUS BLD VENIPUNCTURE: CPT

## 2025-08-22 ENCOUNTER — TELEPHONE (OUTPATIENT)
Dept: PHYSICAL THERAPY | Facility: HOSPITAL | Age: 80
End: 2025-08-22

## 2025-08-22 ENCOUNTER — OFFICE VISIT (OUTPATIENT)
Dept: PHYSICAL THERAPY | Age: 80
End: 2025-08-22
Attending: PHYSICIAN ASSISTANT

## 2025-08-22 DIAGNOSIS — I73.9 CLAUDICATION OF BOTH LOWER EXTREMITIES: Primary | ICD-10-CM

## 2025-08-22 PROCEDURE — 97163 PT EVAL HIGH COMPLEX 45 MIN: CPT | Performed by: PHYSICAL THERAPIST

## 2025-08-27 ENCOUNTER — OFFICE VISIT (OUTPATIENT)
Dept: PHYSICAL THERAPY | Age: 80
End: 2025-08-27
Attending: PHYSICIAN ASSISTANT

## 2025-08-27 PROCEDURE — 97112 NEUROMUSCULAR REEDUCATION: CPT | Performed by: PHYSICAL THERAPIST

## 2025-08-27 PROCEDURE — 97110 THERAPEUTIC EXERCISES: CPT | Performed by: PHYSICAL THERAPIST

## 2025-08-29 ENCOUNTER — OFFICE VISIT (OUTPATIENT)
Dept: PHYSICAL THERAPY | Age: 80
End: 2025-08-29
Attending: PHYSICIAN ASSISTANT

## 2025-08-29 PROCEDURE — 97112 NEUROMUSCULAR REEDUCATION: CPT | Performed by: PHYSICAL THERAPIST

## 2025-08-29 PROCEDURE — 97110 THERAPEUTIC EXERCISES: CPT | Performed by: PHYSICAL THERAPIST

## (undated) DIAGNOSIS — I10 PRIMARY HYPERTENSION: ICD-10-CM

## (undated) DIAGNOSIS — E11.65 TYPE 2 DIABETES MELLITUS WITH HYPERGLYCEMIA, WITHOUT LONG-TERM CURRENT USE OF INSULIN (HCC): Primary | ICD-10-CM

## (undated) DEVICE — VIOLET BRAIDED (POLYGLACTIN 910), SYNTHETIC ABSORBABLE SUTURE: Brand: COATED VICRYL

## (undated) DEVICE — DRAPE EQUIPMENT INTRATEMP THER

## (undated) DEVICE — GOWN,SIRUS,FABRIC-REINFORCED,X-LARGE: Brand: MEDLINE

## (undated) DEVICE — DRAIN RELIAVAC 100CC

## (undated) DEVICE — LAPAROTOMY CDS: Brand: MEDLINE INDUSTRIES, INC.

## (undated) DEVICE — GAUZE SPONGES,USP TYPE VII GAUZE, 12 PLY: Brand: CURITY

## (undated) DEVICE — STERILE POLYISOPRENE POWDER-FREE SURGICAL GLOVES: Brand: PROTEXIS

## (undated) DEVICE — SOL  .9 1000ML BTL

## (undated) DEVICE — DRAIN CHANNEL 19FR BLAKE

## (undated) DEVICE — SUTURE VICRYL 2-0

## (undated) DEVICE — KENDALL SCD EXPRESS SLEEVES, KNEE LENGTH, MEDIUM: Brand: KENDALL SCD

## (undated) DEVICE — PROXIMATE RELOADABLE LINEAR STAPLER: Brand: PROXIMATE

## (undated) DEVICE — LAPAROTOMY SPONGE - RF AND X-RAY DETECTABLE PRE-WASHED: Brand: SITUATE

## (undated) DEVICE — SUTURE ETHILON 2-0 FS

## (undated) DEVICE — BLADE ELECTRODE: Brand: EDGE

## (undated) DEVICE — LIGASURE IMPACT OPEN DEVICE

## (undated) DEVICE — PROXIMATE LINEAR CUTTER RELOAD (STNADARD) , BLUE, 55MM: Brand: PROXIMATE

## (undated) DEVICE — SUTURE PDS II 1 TP-1

## (undated) DEVICE — 3M(TM) MICROPORE TAPE DISPENSER 1535-2: Brand: 3M™ MICROPORE™

## (undated) DEVICE — SUTURE ETHIBOND EXCEL 1-0

## (undated) DEVICE — PROXIMATE RELOADABLE LINEAR CUTTER WITH SAFETY LOCK-OUT.  55MM LINEAR CUTTER.: Brand: PROXIMATE

## (undated) DEVICE — SUTURE ETHILON 3-0 FSL

## (undated) DEVICE — PREMIUM WET SKIN PREP TRAY: Brand: MEDLINE INDUSTRIES, INC.

## (undated) DEVICE — PROXIMATE SKIN STAPLERS (35 WIDE) CONTAINS 35 STAINLESS STEEL STAPLES (FIXED HEAD): Brand: PROXIMATE

## (undated) DEVICE — COVER,MAYO STAND,STERILE: Brand: MEDLINE

## (undated) NOTE — MR AVS SNAPSHOT
[facility] [address]  [address] [facility] [address]  [address] [address] [phone]               Thank you for choosing us for your health care visit with Juan Daniel Martins MD.  We are glad to serve you and happy to provide you wi I have asked the patient to continue the Diflucan for another 3-4 days. He should apply the topical treatment for the next week to 10 days. He is finally over this extreme skin reaction.        Allergies as of Jun 20, 2017     Neosporin [facility] Visit [facility] online at  [email or web address]

## (undated) NOTE — LETTER
11/20/17        [address] [facility] [address]      Dear Lamar Aschoff,    [address] records indicate that you have outstanding lab work and or testing that was ordered for you and has not yet been completed:          CMP now

## (undated) NOTE — MR AVS SNAPSHOT
[facility] [address]  [address] [facility] [address]  [address] [address] [phone]               Thank you for choosing us for your health care visit with Cherelle Nielsen MD.  We are glad to serve you and happy to provide you wi never quite figured out what gives him the strong systemic dermal response every time he comes into the hospital.    We referred him to dermatology. They tried different modalities to clear his skin.   Neosporin was 1 of the agents that were tried on 1 of should try this first on his wrist area on the volar surface. This will be a trial to make sure he does not have extreme reactions. He seems to have had extreme reactions to benign medications such as Neosporin.   It is uncertain whether it is the [employer] and this prescription was added. Make sure you understand how and when to take each. Commonly known as:  DIFLUCAN           lisinopril 5 MG Tabs   Take 1 tablet (5 mg total) by mouth 2 (two) times daily.    Commonly known as:  Tian Mahoney

## (undated) NOTE — ED AVS SNAPSHOT
Linda Coombsmack. MRN: [id number]    Department:  [facility] Emergency Department in [place]   Date of Visit:  11/30/2017           Disclosure     Insurance plans vary and the physician(s) referred by the ER may not be covered by your plan.  Ple tell this physician (or your personal doctor if your instructions are to return to your personal doctor) about any new or lasting problems. The primary care or specialist physician will see patients referred from the [facility] Emergency Department.  Alana Seals

## (undated) NOTE — MR AVS SNAPSHOT
[address]  [address] [facility]. [id number] [phone]               Thank you for choosing us for your health care visit with Oliver Ortega PA-C.   We are glad to serve you and happy to provide you with this summar carvedilol 12.5 MG Tabs   Take 1 tablet (12.5 mg total) by mouth 2 (two) times daily. Commonly known as:  COREG           lisinopril 5 MG Tabs   Take 1 tablet (5 mg total) by mouth 2 (two) times daily.    Commonly known as:  PRINIVIL,ZESTRIL           TY

## (undated) NOTE — Clinical Note
2017    Patient: Mili Arnie. : 1945 Visit date: 2017    Dear  Dr. Sallie Tavares MD,    Thank you for referring Mili Dickerson to my practice. Please find my assessment and plan below.         Assessment   Fungal dermatitis  (p This patient has fungal dermatitis and the anterior abdominal wall. He has residual oral thrush from a thrush infection that was initially treated with topical therapy. I will be giving this patient with Diflucan 200 mg daily for a 14 day course.   I am

## (undated) NOTE — LETTER
10/08/19        [address] [facility] [address]      Dear Teresa Bello,    [address] [facility] records indicate that you have outstanding lab work and or testing that was ordered for you and has not yet been completed:  Orders Placed This E

## (undated) NOTE — ED AVS SNAPSHOT
Mr. Roland Romero. MRN: [id number]    Department:  [facility] Emergency Department in [place]   Date of Visit:  8/11/2018           Disclosure     Insurance plans vary and the physician(s) referred by the ER may not be covered by your plan.  Alexandro tell this physician (or your personal doctor if your instructions are to return to your personal doctor) about any new or lasting problems. The primary care or specialist physician will see patients referred from the [facility] Emergency Department.  Ethan Cobos

## (undated) NOTE — LETTER
October 18, 2017    [facility] [facility] [address]    Dear [place]:  It was a pleasure speaking with you over the phone recently.  To follow up, I wanted to send you some contact information to utilize when you

## (undated) NOTE — MR AVS SNAPSHOT
[facility] [address]  [address] [facility] [address]  [address] [address] [phone]               Thank you for choosing us for your health care visit with Chris Johnson MD.  We are glad to serve you and happy to provide you wi problems from other medications. He was put on steroids. He was putting some Neosporin on his wounds to diminish the scarring and deformity. He apparently developed an allergic reaction to the Neosporin.     He now has bright red scaly raised irritation Neosporin Original [Neomycin-Bacitracin-Polymyxin] Hives    Adhesive Tape Rash    \"adhesive\"   Open sores    Augmentin [Amoxicillin-Pot Clavulanate] Other (See Comments)    Unsure reaction    Neomycin     If used topically- rash                Today's V Call [phone] for help. TERMINALFOURhart is NOT to be used for urgent needs. For medical emergencies, dial 911.            Visit [facility] online at  [email or web address]

## (undated) NOTE — LETTER
[address] [facility] [address], [address] [facility]     PICC LINE INSERTION CONSENT     I agree to have a Peripherally Inserted Central Catheter (PICC) placed in my arm.    1. The PICC insertion procedure, care, maintenance, risks, benefits, and c goals; and potential problems that might occur during recuperation.  They also discussed reasonable alternatives to the PICC, including risks, benefits, and side effects related to the alternatives and risks related to not receiving this procedure      ____

## (undated) NOTE — ED AVS SNAPSHOT
Mr. Garcia Dc. MRN: [id number]    Department:  [facility] Emergency Department in [place]   Date of Visit:  5/18/2018           Disclosure     Insurance plans vary and the physician(s) referred by the ER may not be covered by your plan.  Alexandro tell this physician (or your personal doctor if your instructions are to return to your personal doctor) about any new or lasting problems. The primary care or specialist physician will see patients referred from the [facility] Emergency Department.  Huey Rodríguez

## (undated) NOTE — LETTER
November 30, 2017    Patient: Anika Germain. Date of Visit: 11/30/2017       To Whom It May Concern:    Diogenes Cohen was seen and treated in our emergency department on 11/30/2017.  He should not return to work until cleared by Occup[employer]

## (undated) NOTE — Clinical Note
2017    Patient: Kiya Post. : 1945 Visit date: 2017    Dear  Dr. Moody Blizzard, MD,    Thank you for referring Kiya Post. to my practice. Please find my assessment and plan below.         Assessment   Ventral incisional he He should report to me urgently for any increasing abdominal pain, erythema, fever, purulent drainage, or changes to the skin. He may take Benadryl 4 times daily for any increasing rash. He should get a topical Benadryl cream for any spreading rash.

## (undated) NOTE — MR AVS SNAPSHOT
[facility] [address]  [address] [facility] [address]  [address] [address] [phone]               Thank you for choosing us for your health care visit with Candy Canseco MD.  We are glad to serve you and happy to provide you wi shingles vaccination to his knowledge. Physical exam:  The abdomen is soft, nondistended, nontender, with active bowel sounds. There is no evidence of recurrent or residual hernia. The right upper quadrant incision is healed well.   There is an area of urgently for any problems or complications related to my surgical intervention. Follow Up with Our Office     Return if symptoms worsen or fail to improve.       Your Appointments     May 01, 2017 10:40 AM   FOLLOW UP with MD Marah Hassan Return if symptoms worsen or fail to improve. MyChart     Visit MyChart  You can access your MyChart to more actively manage your health care and view more details from this visit by going to [email or web address].   If you've recently had a Don’t forget strength training with weights and resistance Set goals and track your progress   You don’t need to join a gym. Home exercises work great.  Put more priority on exercise in your life                    Visit [facility] online at

## (undated) NOTE — MR AVS SNAPSHOT
[address]  [address]  [address] [address] [phone]               Thank you for choosing us for your health care visit with Tiffani Montoya PA-C.   We are glad to serve you and happy to provide you with this sum on your skin. It is also found on the membranes of your mouth and throat. Usually, this yeast grows only in small amounts and is harmless. But in some cases, Candida can grow out of control and cause thrush.  Mariann Walsh is related to other kinds of Candida infe serious form of widespread infection. Petroleum Fontan is usually treated with antifungal medicine. The medicine is put directly in your mouth and throat. You may be given a “swish and swallow” medicine or an antifungal lozenge.   In some cases, you may need an antif · White or red patches inside your mouth or on the back of your throat   Date Last Reviewed: 3/19/2015  © 9385-8896 [address], [address] [place] [address]. All rights reserved.  This information is not intended as a substitute [address] [facility] [address], [phone], [address].  [facility] [address]     Phone:  [phone]    - Lidocaine Viscous 2 % Soln  - nystatin [id number] UNIT/ML Susp            MyChart     Visit MyChart  You can access

## (undated) NOTE — LETTER
18    Patient: Cielo Schumacher. : 1945 Visit date: 2018    Dear  Dr. Kurt Aguilar MD,    Thank you for referring Cielo Schumacher. to my practice. Please find my assessment and plan below.              Assessment   Chronic abdominal gradually reabsorb. Once the mesh is gone, he will be reliant entirely on the strength of his abdominal wall to prevent future hernias. For this reason, refraining from lifting activity is of the utmost importance   for at least 12 weeks.     The patient

## (undated) NOTE — LETTER
02/22/18        [address] [place] [address]      Dear Jackson Perez,    [facility] records indicate that you have outstanding lab work and or testing that was ordered for you and has not yet been completed:          Hemoglobin A1C

## (undated) NOTE — LETTER
[address] [facility] [address]  [address] [address]      Dear Rowdy Fields:  It was a pleasure speaking with you over the phone recently.  [facility]  Population Health Management Dept. Laura Thakur [address] [place] [address], [address] [facility] ([id number] [id number])  Office [phone]  [email or web address]. org

## (undated) NOTE — LETTER
18    Patient: Radha Irwin. : 1945 Visit date: 2018    Dear  Dr. Ann-Marie Alvarado MD,    Thank you for referring Radha IrwinJuanjose to my practice. Please find my assessment and plan below.                Assessment   Chronic abdominal my office today, the patient started draining a mucopurulent material.    Clinical examination today reveals a vertical midline incision that goes from the xiphoid to near the pubis.   There is a pair of left lateral incisions one above the level of the umb back and remove all of the mesh. If mesh is obviously infected, however it is unlikely that we will be able to treat the infection without removing the mesh.   Tomorrow's procedure will be a minor procedure as a wound exploration under anesthesia with debr

## (undated) NOTE — Clinical Note
2017    Patient: Serenity Tan. : 1945 Visit date: 2017    Dear  Dr. Althea Irby MD,    Thank you for referring Serenity Tan. to my practice. Please find my assessment and plan below.         Assessment   Fungal dermatitis  (pr The bad news is they still appear to remain fairly aggressive. The largest is vertical along the midline incision in the epigastrium and it measures approximately 6 cm tall by 3 cm wide.   The other 2 lesions are more round and are approximately 2-1/2 cm

## (undated) NOTE — MR AVS SNAPSHOT
[address] [facility] [address] [facility] [address]  [place] [address] [phone]               Thank you for choosing us for your health care visit with Yolande Pacheco MD.  We are glad to serve you and happy to provide you wi no palpable seroma. The patient's tape blister in the right upper quadrant shows some necrotic blistering skin. The back has a very small patch of a raised rash that is 4\" x 4\". I have asked him to apply Neosporin to the blister site.     I have aske B COMPLEX OR   Take 1 tablet by mouth daily. carvedilol 12.5 MG Tabs   Take 1 tablet (12.5 mg total) by mouth 2 (two) times daily.    Commonly known as:  COREG           Cefadroxil 500 MG Caps   Take 1 capsule (500 mg total) by mouth 2 (two) t

## (undated) NOTE — Clinical Note
2017    Patient: Cielo Schumacher. : 1945 Visit date: 2017    Dear  Dr. Kurt Aguilar MD,    Thank you for referring Cielo Schumacher. to my practice. Please find my assessment and plan below.         Assessment   Ventral incisional he quadrant wound has no erythema or drainage. There is no surrounding cellulitis. There is no urticarial lesions surrounding this incision.   Additionally, the patient mid back along the same dermatome there is an urticarial rash with some very small vesicl

## (undated) NOTE — Clinical Note
2017    Patient: Pauline Raphael. : 1945 Visit date: 2017    Dear  Dr. Hilda Salcedo MD,    Thank you for referring Pauline Raphael. to my practice. Please find my assessment and plan below.         Assessment   Fungal dermatitis  (p

## (undated) NOTE — LETTER
[facility]  Mary Richardson [id number] [facility], [address] [place] [address]    Consent for Operation    Date: ___6/11/18_______________    Time: _______________    1.  I authorize the performance upon Ethyl Berna. the following operation:    Procedure(s):  EXPLORAT revealed by the pictures or by descriptive texts accompanying them. If the procedure has been videotaped, the surgeon will obtain the original videotape. The hospital will not be responsible for storage or maintenance of this tape.     6. For the purpose of THAT MY DOCTOR PROVIDED ME WITH THE ABOVE EXPLANATIONS, THAT ALL BLANKS OR STATEMENTS REQUIRING INSERTION OR COMPLETION WERE FILLED IN.     Signature of Patient:   ___________________________    When the patient is a minor or mentally incompetent to give co · All of the medicines I take (including prescriptions, herbal supplements, and pills I can buy without a prescription (including street drugs/illegal medications).  Failure to inform my anesthesiologist about these medicines may increase my risk of anesthe _____________________________________________________________________________  Anesthesiologist Signature     Date   Time  I have discussed the procedure and information above with the patient (or patient’s representative) and answered their questions.  The

## (undated) NOTE — LETTER
Oxana Miles 89 [address], [address] [facility]     PICC LINE INSERTION CONSENT     I agree to have a Peripherally Inserted Central Catheter (PICC) placed in my arm.    1. The PICC insertion procedure, care, maintenance, risks, benefits, and c benefits, risks, and side effects of the PICC; the likelihood of achieving goals; and potential problems that might occur during recuperation.  They also discussed reasonable alternatives to the PICC, including risks, benefits, and side effects related to t

## (undated) NOTE — LETTER
07/31/18        [address] [place] [id number]      Dear Felisa Jha,    [facility] records indicate that you have outstanding lab work and or testing that was ordered for you and has not yet been completed:          CMP now      L